# Patient Record
Sex: FEMALE | Race: WHITE | Employment: FULL TIME
[De-identification: names, ages, dates, MRNs, and addresses within clinical notes are randomized per-mention and may not be internally consistent; named-entity substitution may affect disease eponyms.]

---

## 2017-03-07 ENCOUNTER — PATIENT MESSAGE (OUTPATIENT)
Dept: INTERNAL MEDICINE | Facility: CLINIC | Age: 37
End: 2017-03-07

## 2017-03-07 DIAGNOSIS — F32.A DEPRESSION: ICD-10-CM

## 2017-03-07 DIAGNOSIS — F32.A DEPRESSION, UNSPECIFIED DEPRESSION TYPE: ICD-10-CM

## 2017-03-07 RX ORDER — DULOXETIN HYDROCHLORIDE 60 MG/1
60 CAPSULE, DELAYED RELEASE ORAL DAILY
Qty: 30 CAPSULE | Refills: 11 | OUTPATIENT
Start: 2017-03-07

## 2017-03-07 RX ORDER — DULOXETIN HYDROCHLORIDE 60 MG/1
CAPSULE, DELAYED RELEASE ORAL
Qty: 90 CAPSULE | Refills: 3 | Status: SHIPPED | OUTPATIENT
Start: 2017-03-07 | End: 2018-03-08 | Stop reason: SDUPTHER

## 2017-03-09 DIAGNOSIS — R11.0 NAUSEA: Primary | ICD-10-CM

## 2017-03-09 RX ORDER — SCOLOPAMINE TRANSDERMAL SYSTEM 1 MG/1
1 PATCH, EXTENDED RELEASE TRANSDERMAL
Qty: 4 PATCH | Refills: 1 | Status: SHIPPED | OUTPATIENT
Start: 2017-03-09 | End: 2018-03-09

## 2017-03-31 ENCOUNTER — OFFICE VISIT (OUTPATIENT)
Dept: INTERNAL MEDICINE CLINIC | Age: 37
End: 2017-03-31
Payer: COMMERCIAL

## 2017-03-31 VITALS
HEART RATE: 87 BPM | SYSTOLIC BLOOD PRESSURE: 124 MMHG | WEIGHT: 182 LBS | DIASTOLIC BLOOD PRESSURE: 80 MMHG | HEIGHT: 64 IN | BODY MASS INDEX: 31.07 KG/M2

## 2017-03-31 DIAGNOSIS — F32.A DEPRESSION, UNSPECIFIED DEPRESSION TYPE: ICD-10-CM

## 2017-03-31 DIAGNOSIS — Z00.00 ANNUAL PHYSICAL EXAM: Primary | ICD-10-CM

## 2017-03-31 PROCEDURE — 99213 OFFICE O/P EST LOW 20 MIN: CPT | Performed by: INTERNAL MEDICINE

## 2017-03-31 ASSESSMENT — ENCOUNTER SYMPTOMS
CHEST TIGHTNESS: 0
EYE DISCHARGE: 0
ABDOMINAL DISTENTION: 0
COUGH: 0
EYE REDNESS: 0
ABDOMINAL PAIN: 0
BACK PAIN: 0
ANAL BLEEDING: 0
CHOKING: 0
COLOR CHANGE: 0
APNEA: 0
EYE ITCHING: 0
EYE PAIN: 0

## 2017-03-31 ASSESSMENT — PATIENT HEALTH QUESTIONNAIRE - PHQ9
1. LITTLE INTEREST OR PLEASURE IN DOING THINGS: 0
2. FEELING DOWN, DEPRESSED OR HOPELESS: 0
SUM OF ALL RESPONSES TO PHQ9 QUESTIONS 1 & 2: 0
SUM OF ALL RESPONSES TO PHQ QUESTIONS 1-9: 0

## 2017-04-11 ENCOUNTER — EMPLOYEE WELLNESS (OUTPATIENT)
Dept: OTHER | Age: 37
End: 2017-04-11

## 2017-04-11 LAB
CHOLESTEROL/HDL RATIO: 2.7
CHOLESTEROL: 174 MG/DL
GLUCOSE BLD-MCNC: 90 MG/DL (ref 70–99)
HDLC SERPL-MCNC: 64 MG/DL
LDL CHOLESTEROL: 95 MG/DL (ref 0–130)
PATIENT FASTING?: YES
TRIGL SERPL-MCNC: 77 MG/DL
VLDLC SERPL CALC-MCNC: NORMAL MG/DL (ref 1–30)

## 2017-06-08 ENCOUNTER — PATIENT MESSAGE (OUTPATIENT)
Dept: GASTROENTEROLOGY | Facility: CLINIC | Age: 37
End: 2017-06-08

## 2017-06-08 RX ORDER — ESOMEPRAZOLE MAGNESIUM 40 MG/1
40 CAPSULE, DELAYED RELEASE ORAL DAILY
Qty: 30 CAPSULE | Refills: 2 | Status: SHIPPED | OUTPATIENT
Start: 2017-06-08 | End: 2017-09-19 | Stop reason: SDUPTHER

## 2017-09-19 RX ORDER — ESOMEPRAZOLE MAGNESIUM 40 MG/1
CAPSULE, DELAYED RELEASE ORAL
Qty: 30 CAPSULE | Refills: 0 | Status: SHIPPED | OUTPATIENT
Start: 2017-09-19 | End: 2018-05-30 | Stop reason: DRUGHIGH

## 2017-09-29 ENCOUNTER — OFFICE VISIT (OUTPATIENT)
Dept: GASTROENTEROLOGY | Age: 37
End: 2017-09-29
Payer: COMMERCIAL

## 2017-09-29 VITALS
SYSTOLIC BLOOD PRESSURE: 134 MMHG | RESPIRATION RATE: 14 BRPM | BODY MASS INDEX: 32.85 KG/M2 | OXYGEN SATURATION: 99 % | HEIGHT: 64 IN | TEMPERATURE: 98.1 F | HEART RATE: 88 BPM | WEIGHT: 192.4 LBS | DIASTOLIC BLOOD PRESSURE: 90 MMHG

## 2017-09-29 DIAGNOSIS — K21.9 GASTROESOPHAGEAL REFLUX DISEASE WITHOUT ESOPHAGITIS: ICD-10-CM

## 2017-09-29 DIAGNOSIS — K22.2 SCHATZKI'S RING: Primary | ICD-10-CM

## 2017-09-29 DIAGNOSIS — R13.10 DYSPHAGIA, UNSPECIFIED TYPE: ICD-10-CM

## 2017-09-29 PROCEDURE — 99214 OFFICE O/P EST MOD 30 MIN: CPT | Performed by: INTERNAL MEDICINE

## 2017-09-29 RX ORDER — OMEPRAZOLE 20 MG/1
20 CAPSULE, DELAYED RELEASE ORAL
Qty: 90 CAPSULE | Refills: 3 | Status: SHIPPED | OUTPATIENT
Start: 2017-09-29 | End: 2018-05-30 | Stop reason: CLARIF

## 2017-09-29 ASSESSMENT — ENCOUNTER SYMPTOMS
VOMITING: 0
NAUSEA: 0
TROUBLE SWALLOWING: 0
CHOKING: 0
GASTROINTESTINAL NEGATIVE: 1
ABDOMINAL DISTENTION: 0
CONSTIPATION: 0
EYES NEGATIVE: 1
SORE THROAT: 0
ALLERGIC/IMMUNOLOGIC NEGATIVE: 1
RECTAL PAIN: 0
RESPIRATORY NEGATIVE: 1
BLOOD IN STOOL: 0
ABDOMINAL PAIN: 0
ANAL BLEEDING: 0
DIARRHEA: 0

## 2018-01-19 ENCOUNTER — HOSPITAL ENCOUNTER (OUTPATIENT)
Age: 38
Setting detail: SPECIMEN
Discharge: HOME | End: 2018-01-19
Payer: COMMERCIAL

## 2018-01-30 LAB — CYTOLOGY REPORT: NORMAL

## 2018-03-07 DIAGNOSIS — F32.A DEPRESSION, UNSPECIFIED DEPRESSION TYPE: ICD-10-CM

## 2018-03-08 DIAGNOSIS — F32.A DEPRESSION, UNSPECIFIED DEPRESSION TYPE: ICD-10-CM

## 2018-03-08 RX ORDER — DULOXETIN HYDROCHLORIDE 60 MG/1
CAPSULE, DELAYED RELEASE ORAL
Qty: 90 CAPSULE | Refills: 3 | Status: SHIPPED | OUTPATIENT
Start: 2018-03-08 | End: 2019-03-06 | Stop reason: SDUPTHER

## 2018-03-08 RX ORDER — DULOXETIN HYDROCHLORIDE 60 MG/1
CAPSULE, DELAYED RELEASE ORAL
Qty: 90 CAPSULE | Refills: 3 | OUTPATIENT
Start: 2018-03-08

## 2018-03-20 VITALS — BODY MASS INDEX: 30.9 KG/M2 | WEIGHT: 181 LBS

## 2018-04-13 ENCOUNTER — OFFICE VISIT (OUTPATIENT)
Dept: ORTHOPEDIC SURGERY | Age: 38
End: 2018-04-13
Payer: COMMERCIAL

## 2018-04-13 VITALS
HEIGHT: 64 IN | SYSTOLIC BLOOD PRESSURE: 139 MMHG | WEIGHT: 194 LBS | BODY MASS INDEX: 33.12 KG/M2 | DIASTOLIC BLOOD PRESSURE: 89 MMHG

## 2018-04-13 DIAGNOSIS — M25.562 LEFT KNEE PAIN, UNSPECIFIED CHRONICITY: ICD-10-CM

## 2018-04-13 DIAGNOSIS — Z01.818 PRE-OP TESTING: ICD-10-CM

## 2018-04-13 DIAGNOSIS — M17.11 ARTHRITIS OF RIGHT KNEE: Primary | ICD-10-CM

## 2018-04-13 PROCEDURE — 99203 OFFICE O/P NEW LOW 30 MIN: CPT | Performed by: ORTHOPAEDIC SURGERY

## 2018-04-13 RX ORDER — ACETAMINOPHEN 500 MG
500 TABLET ORAL EVERY 6 HOURS PRN
Status: ON HOLD | COMMUNITY
End: 2018-06-13 | Stop reason: HOSPADM

## 2018-04-13 ASSESSMENT — ENCOUNTER SYMPTOMS
CONSTIPATION: 0
DIARRHEA: 0
NAUSEA: 0
COUGH: 0

## 2018-05-18 ENCOUNTER — EMPLOYEE WELLNESS (OUTPATIENT)
Dept: OTHER | Age: 38
End: 2018-05-18

## 2018-05-18 LAB
CHOLESTEROL/HDL RATIO: 3.1
CHOLESTEROL: 172 MG/DL
GLUCOSE BLD-MCNC: 92 MG/DL (ref 70–99)
HDLC SERPL-MCNC: 55 MG/DL
LDL CHOLESTEROL: 90 MG/DL (ref 0–130)
PATIENT FASTING?: YES
TRIGL SERPL-MCNC: 137 MG/DL
VLDLC SERPL CALC-MCNC: NORMAL MG/DL (ref 1–30)

## 2018-05-29 VITALS — WEIGHT: 193 LBS | BODY MASS INDEX: 33.13 KG/M2

## 2018-05-30 ENCOUNTER — HOSPITAL ENCOUNTER (OUTPATIENT)
Dept: PREADMISSION TESTING | Age: 38
Discharge: HOME | End: 2018-06-03
Payer: COMMERCIAL

## 2018-05-30 VITALS
BODY MASS INDEX: 33.12 KG/M2 | TEMPERATURE: 98.5 F | DIASTOLIC BLOOD PRESSURE: 85 MMHG | WEIGHT: 194 LBS | HEART RATE: 84 BPM | OXYGEN SATURATION: 100 % | RESPIRATION RATE: 16 BRPM | SYSTOLIC BLOOD PRESSURE: 123 MMHG | HEIGHT: 64 IN

## 2018-05-30 DIAGNOSIS — Z01.818 PRE-OP TESTING: ICD-10-CM

## 2018-05-30 LAB
ALBUMIN SERPL-MCNC: 4.1 G/DL (ref 3.5–5.2)
ALBUMIN/GLOBULIN RATIO: 1.4 (ref 1–2.5)
ALP BLD-CCNC: 82 U/L (ref 35–104)
ALT SERPL-CCNC: 17 U/L (ref 5–33)
ANION GAP SERPL CALCULATED.3IONS-SCNC: 9 MMOL/L (ref 9–17)
AST SERPL-CCNC: 18 U/L
BILIRUB SERPL-MCNC: 0.22 MG/DL (ref 0.3–1.2)
BILIRUBIN URINE: NEGATIVE
BUN BLDV-MCNC: 23 MG/DL (ref 6–20)
BUN/CREAT BLD: ABNORMAL (ref 9–20)
CALCIUM SERPL-MCNC: 8.7 MG/DL (ref 8.6–10.4)
CHLORIDE BLD-SCNC: 104 MMOL/L (ref 98–107)
CO2: 25 MMOL/L (ref 20–31)
COLOR: YELLOW
COMMENT UA: NORMAL
CREAT SERPL-MCNC: 0.74 MG/DL (ref 0.5–0.9)
EKG ATRIAL RATE: 81 BPM
EKG P AXIS: 59 DEGREES
EKG P-R INTERVAL: 128 MS
EKG Q-T INTERVAL: 388 MS
EKG QRS DURATION: 86 MS
EKG QTC CALCULATION (BAZETT): 450 MS
EKG R AXIS: 53 DEGREES
EKG T AXIS: 36 DEGREES
EKG VENTRICULAR RATE: 81 BPM
GFR AFRICAN AMERICAN: >60 ML/MIN
GFR NON-AFRICAN AMERICAN: >60 ML/MIN
GFR SERPL CREATININE-BSD FRML MDRD: ABNORMAL ML/MIN/{1.73_M2}
GFR SERPL CREATININE-BSD FRML MDRD: ABNORMAL ML/MIN/{1.73_M2}
GLUCOSE BLD-MCNC: 90 MG/DL (ref 70–99)
GLUCOSE URINE: NEGATIVE
HCT VFR BLD CALC: 39.4 % (ref 36.3–47.1)
HEMOGLOBIN: 12.6 G/DL (ref 11.9–15.1)
KETONES, URINE: NEGATIVE
LEUKOCYTE ESTERASE, URINE: NEGATIVE
MCH RBC QN AUTO: 28.6 PG (ref 25.2–33.5)
MCHC RBC AUTO-ENTMCNC: 32 G/DL (ref 28.4–34.8)
MCV RBC AUTO: 89.3 FL (ref 82.6–102.9)
NITRITE, URINE: NEGATIVE
NRBC AUTOMATED: 0 PER 100 WBC
PDW BLD-RTO: 12.9 % (ref 11.8–14.4)
PH UA: 5 (ref 5–8)
PLATELET # BLD: 263 K/UL (ref 138–453)
PMV BLD AUTO: 10.2 FL (ref 8.1–13.5)
POTASSIUM SERPL-SCNC: 4.1 MMOL/L (ref 3.7–5.3)
PROTEIN UA: NEGATIVE
RBC # BLD: 4.41 M/UL (ref 3.95–5.11)
SODIUM BLD-SCNC: 138 MMOL/L (ref 135–144)
SPECIFIC GRAVITY UA: 1.03 (ref 1–1.03)
TOTAL PROTEIN: 7 G/DL (ref 6.4–8.3)
TURBIDITY: CLEAR
URINE HGB: NEGATIVE
UROBILINOGEN, URINE: NORMAL
WBC # BLD: 7.5 K/UL (ref 3.5–11.3)

## 2018-05-30 PROCEDURE — 81003 URINALYSIS AUTO W/O SCOPE: CPT

## 2018-05-30 PROCEDURE — 93005 ELECTROCARDIOGRAM TRACING: CPT

## 2018-05-30 PROCEDURE — 87641 MR-STAPH DNA AMP PROBE: CPT

## 2018-05-30 PROCEDURE — 36415 COLL VENOUS BLD VENIPUNCTURE: CPT

## 2018-05-30 PROCEDURE — 80053 COMPREHEN METABOLIC PANEL: CPT

## 2018-05-30 PROCEDURE — 85027 COMPLETE CBC AUTOMATED: CPT

## 2018-05-30 RX ORDER — SODIUM CHLORIDE, SODIUM LACTATE, POTASSIUM CHLORIDE, CALCIUM CHLORIDE 600; 310; 30; 20 MG/100ML; MG/100ML; MG/100ML; MG/100ML
1000 INJECTION, SOLUTION INTRAVENOUS CONTINUOUS
Status: CANCELLED | OUTPATIENT
Start: 2018-05-30

## 2018-05-30 ASSESSMENT — PAIN DESCRIPTION - ONSET
ONSET: ON-GOING
ONSET: ON-GOING

## 2018-05-30 ASSESSMENT — PAIN DESCRIPTION - LOCATION
LOCATION: KNEE
LOCATION: KNEE

## 2018-05-30 ASSESSMENT — PAIN SCALES - GENERAL
PAINLEVEL_OUTOF10: 7
PAINLEVEL_OUTOF10: 7

## 2018-05-30 ASSESSMENT — PAIN DESCRIPTION - FREQUENCY
FREQUENCY: CONTINUOUS
FREQUENCY: CONTINUOUS

## 2018-05-30 ASSESSMENT — PAIN DESCRIPTION - DIRECTION
RADIATING_TOWARDS: RT. LEG
RADIATING_TOWARDS: RT. LEG

## 2018-05-30 ASSESSMENT — PAIN DESCRIPTION - ORIENTATION
ORIENTATION: RIGHT
ORIENTATION: RIGHT

## 2018-05-30 ASSESSMENT — PAIN DESCRIPTION - PAIN TYPE
TYPE: CHRONIC PAIN
TYPE: CHRONIC PAIN

## 2018-05-30 ASSESSMENT — PAIN DESCRIPTION - DESCRIPTORS
DESCRIPTORS: ACHING;THROBBING;CONSTANT
DESCRIPTORS: ACHING;CONSTANT;THROBBING

## 2018-05-30 ASSESSMENT — PAIN DESCRIPTION - PROGRESSION
CLINICAL_PROGRESSION: GRADUALLY WORSENING
CLINICAL_PROGRESSION: GRADUALLY WORSENING

## 2018-05-31 LAB
MRSA, DNA, NASAL: NORMAL
SPECIMEN DESCRIPTION: NORMAL

## 2018-06-08 ENCOUNTER — OFFICE VISIT (OUTPATIENT)
Dept: INTERNAL MEDICINE CLINIC | Age: 38
End: 2018-06-08
Payer: COMMERCIAL

## 2018-06-08 VITALS
SYSTOLIC BLOOD PRESSURE: 126 MMHG | WEIGHT: 196 LBS | HEIGHT: 64 IN | DIASTOLIC BLOOD PRESSURE: 82 MMHG | BODY MASS INDEX: 33.46 KG/M2

## 2018-06-08 DIAGNOSIS — E66.9 CLASS 1 OBESITY WITH SERIOUS COMORBIDITY IN ADULT, UNSPECIFIED BMI, UNSPECIFIED OBESITY TYPE: Primary | ICD-10-CM

## 2018-06-08 DIAGNOSIS — L98.9 SKIN LESION: ICD-10-CM

## 2018-06-08 PROCEDURE — 99213 OFFICE O/P EST LOW 20 MIN: CPT | Performed by: INTERNAL MEDICINE

## 2018-06-08 ASSESSMENT — PATIENT HEALTH QUESTIONNAIRE - PHQ9
2. FEELING DOWN, DEPRESSED OR HOPELESS: 0
SUM OF ALL RESPONSES TO PHQ9 QUESTIONS 1 & 2: 0
SUM OF ALL RESPONSES TO PHQ QUESTIONS 1-9: 0
1. LITTLE INTEREST OR PLEASURE IN DOING THINGS: 0

## 2018-06-12 ENCOUNTER — ANESTHESIA (OUTPATIENT)
Dept: OPERATING ROOM | Age: 38
DRG: 470 | End: 2018-06-12
Payer: COMMERCIAL

## 2018-06-12 ENCOUNTER — ANESTHESIA EVENT (OUTPATIENT)
Dept: OPERATING ROOM | Age: 38
DRG: 470 | End: 2018-06-12
Payer: COMMERCIAL

## 2018-06-12 ENCOUNTER — APPOINTMENT (OUTPATIENT)
Dept: GENERAL RADIOLOGY | Age: 38
DRG: 470 | End: 2018-06-12
Attending: ORTHOPAEDIC SURGERY
Payer: COMMERCIAL

## 2018-06-12 ENCOUNTER — HOSPITAL ENCOUNTER (INPATIENT)
Age: 38
LOS: 1 days | Discharge: HOME | DRG: 470 | End: 2018-06-13
Attending: ORTHOPAEDIC SURGERY | Admitting: ORTHOPAEDIC SURGERY
Payer: COMMERCIAL

## 2018-06-12 VITALS — OXYGEN SATURATION: 99 % | SYSTOLIC BLOOD PRESSURE: 106 MMHG | DIASTOLIC BLOOD PRESSURE: 58 MMHG | TEMPERATURE: 96.3 F

## 2018-06-12 DIAGNOSIS — Z96.651 STATUS POST RIGHT PARTIAL KNEE REPLACEMENT: Primary | ICD-10-CM

## 2018-06-12 LAB — HCG, PREGNANCY URINE (POC): NEGATIVE

## 2018-06-12 PROCEDURE — 84703 CHORIONIC GONADOTROPIN ASSAY: CPT

## 2018-06-12 PROCEDURE — 6360000002 HC RX W HCPCS

## 2018-06-12 PROCEDURE — 6360000002 HC RX W HCPCS: Performed by: STUDENT IN AN ORGANIZED HEALTH CARE EDUCATION/TRAINING PROGRAM

## 2018-06-12 PROCEDURE — 3600000004 HC SURGERY LEVEL 4 BASE: Performed by: ORTHOPAEDIC SURGERY

## 2018-06-12 PROCEDURE — 1200000000 HC SEMI PRIVATE

## 2018-06-12 PROCEDURE — 0SRC0J9 REPLACEMENT OF RIGHT KNEE JOINT WITH SYNTHETIC SUBSTITUTE, CEMENTED, OPEN APPROACH: ICD-10-PCS | Performed by: ORTHOPAEDIC SURGERY

## 2018-06-12 PROCEDURE — 73562 X-RAY EXAM OF KNEE 3: CPT

## 2018-06-12 PROCEDURE — 2580000003 HC RX 258: Performed by: ORTHOPAEDIC SURGERY

## 2018-06-12 PROCEDURE — 2580000003 HC RX 258: Performed by: STUDENT IN AN ORGANIZED HEALTH CARE EDUCATION/TRAINING PROGRAM

## 2018-06-12 PROCEDURE — 6370000000 HC RX 637 (ALT 250 FOR IP): Performed by: STUDENT IN AN ORGANIZED HEALTH CARE EDUCATION/TRAINING PROGRAM

## 2018-06-12 PROCEDURE — 97530 THERAPEUTIC ACTIVITIES: CPT

## 2018-06-12 PROCEDURE — 2500000003 HC RX 250 WO HCPCS: Performed by: ANESTHESIOLOGY

## 2018-06-12 PROCEDURE — 7100000001 HC PACU RECOVERY - ADDTL 15 MIN: Performed by: ORTHOPAEDIC SURGERY

## 2018-06-12 PROCEDURE — 2580000003 HC RX 258: Performed by: ANESTHESIOLOGY

## 2018-06-12 PROCEDURE — C1776 JOINT DEVICE (IMPLANTABLE): HCPCS | Performed by: ORTHOPAEDIC SURGERY

## 2018-06-12 PROCEDURE — G8987 SELF CARE CURRENT STATUS: HCPCS

## 2018-06-12 PROCEDURE — 3600000014 HC SURGERY LEVEL 4 ADDTL 15MIN: Performed by: ORTHOPAEDIC SURGERY

## 2018-06-12 PROCEDURE — 6360000002 HC RX W HCPCS: Performed by: SPECIALIST

## 2018-06-12 PROCEDURE — G8988 SELF CARE GOAL STATUS: HCPCS

## 2018-06-12 PROCEDURE — G0378 HOSPITAL OBSERVATION PER HR: HCPCS

## 2018-06-12 PROCEDURE — 64447 NJX AA&/STRD FEMORAL NRV IMG: CPT | Performed by: ANESTHESIOLOGY

## 2018-06-12 PROCEDURE — 3700000001 HC ADD 15 MINUTES (ANESTHESIA): Performed by: ORTHOPAEDIC SURGERY

## 2018-06-12 PROCEDURE — 2500000003 HC RX 250 WO HCPCS: Performed by: STUDENT IN AN ORGANIZED HEALTH CARE EDUCATION/TRAINING PROGRAM

## 2018-06-12 PROCEDURE — C1713 ANCHOR/SCREW BN/BN,TIS/BN: HCPCS | Performed by: ORTHOPAEDIC SURGERY

## 2018-06-12 PROCEDURE — 97162 PT EVAL MOD COMPLEX 30 MIN: CPT

## 2018-06-12 PROCEDURE — 6370000000 HC RX 637 (ALT 250 FOR IP): Performed by: ANESTHESIOLOGY

## 2018-06-12 PROCEDURE — G8978 MOBILITY CURRENT STATUS: HCPCS

## 2018-06-12 PROCEDURE — 97166 OT EVAL MOD COMPLEX 45 MIN: CPT

## 2018-06-12 PROCEDURE — 3700000000 HC ANESTHESIA ATTENDED CARE: Performed by: ORTHOPAEDIC SURGERY

## 2018-06-12 PROCEDURE — 2720000010 HC SURG SUPPLY STERILE: Performed by: ORTHOPAEDIC SURGERY

## 2018-06-12 PROCEDURE — 7100000000 HC PACU RECOVERY - FIRST 15 MIN: Performed by: ORTHOPAEDIC SURGERY

## 2018-06-12 PROCEDURE — 97535 SELF CARE MNGMENT TRAINING: CPT

## 2018-06-12 PROCEDURE — G8979 MOBILITY GOAL STATUS: HCPCS

## 2018-06-12 PROCEDURE — 2500000003 HC RX 250 WO HCPCS: Performed by: SPECIALIST

## 2018-06-12 DEVICE — IMPLANTABLE DEVICE: Type: IMPLANTABLE DEVICE | Site: KNEE | Status: FUNCTIONAL

## 2018-06-12 DEVICE — JOURNEY UNI OXINIUM FIXED BEARING                                    FEMORAL SIZE 4 RIGHT MEDIAL/LEFT LATERAL
Type: IMPLANTABLE DEVICE | Site: KNEE | Status: FUNCTIONAL
Brand: JOURNEY

## 2018-06-12 DEVICE — DISCONTINUED USE 416978 CEMENT PALACOS R SING DOSE 40GR: Type: IMPLANTABLE DEVICE | Site: KNEE | Status: FUNCTIONAL

## 2018-06-12 DEVICE — COMPONENT KNEE LOWER EXTREMITIES. ANCIL ZIRCONIUM NAVIO DISP: Type: IMPLANTABLE DEVICE | Site: KNEE | Status: FUNCTIONAL

## 2018-06-12 RX ORDER — SODIUM CHLORIDE 0.9 % (FLUSH) 0.9 %
10 SYRINGE (ML) INJECTION EVERY 12 HOURS SCHEDULED
Status: DISCONTINUED | OUTPATIENT
Start: 2018-06-12 | End: 2018-06-13 | Stop reason: HOSPADM

## 2018-06-12 RX ORDER — KETOROLAC TROMETHAMINE 30 MG/ML
30 INJECTION, SOLUTION INTRAMUSCULAR; INTRAVENOUS EVERY 6 HOURS
Status: DISCONTINUED | OUTPATIENT
Start: 2018-06-12 | End: 2018-06-13 | Stop reason: HOSPADM

## 2018-06-12 RX ORDER — MEPERIDINE HYDROCHLORIDE 50 MG/ML
12.5 INJECTION INTRAMUSCULAR; INTRAVENOUS; SUBCUTANEOUS EVERY 5 MIN PRN
Status: DISCONTINUED | OUTPATIENT
Start: 2018-06-12 | End: 2018-06-12 | Stop reason: HOSPADM

## 2018-06-12 RX ORDER — MORPHINE SULFATE 4 MG/ML
4 INJECTION, SOLUTION INTRAMUSCULAR; INTRAVENOUS
Status: DISCONTINUED | OUTPATIENT
Start: 2018-06-12 | End: 2018-06-13 | Stop reason: HOSPADM

## 2018-06-12 RX ORDER — MORPHINE SULFATE 2 MG/ML
2 INJECTION, SOLUTION INTRAMUSCULAR; INTRAVENOUS
Status: DISCONTINUED | OUTPATIENT
Start: 2018-06-12 | End: 2018-06-13 | Stop reason: HOSPADM

## 2018-06-12 RX ORDER — ACETAMINOPHEN 650 MG
TABLET, EXTENDED RELEASE ORAL PRN
Status: DISCONTINUED | OUTPATIENT
Start: 2018-06-12 | End: 2018-06-12 | Stop reason: HOSPADM

## 2018-06-12 RX ORDER — FENTANYL CITRATE 50 UG/ML
INJECTION, SOLUTION INTRAMUSCULAR; INTRAVENOUS
Status: COMPLETED
Start: 2018-06-12 | End: 2018-06-12

## 2018-06-12 RX ORDER — DULOXETIN HYDROCHLORIDE 30 MG/1
60 CAPSULE, DELAYED RELEASE ORAL DAILY
Status: DISCONTINUED | OUTPATIENT
Start: 2018-06-12 | End: 2018-06-13 | Stop reason: HOSPADM

## 2018-06-12 RX ORDER — MIDAZOLAM HYDROCHLORIDE 1 MG/ML
INJECTION INTRAMUSCULAR; INTRAVENOUS
Status: COMPLETED
Start: 2018-06-12 | End: 2018-06-12

## 2018-06-12 RX ORDER — FENTANYL CITRATE 50 UG/ML
25 INJECTION, SOLUTION INTRAMUSCULAR; INTRAVENOUS EVERY 5 MIN PRN
Status: DISCONTINUED | OUTPATIENT
Start: 2018-06-12 | End: 2018-06-12 | Stop reason: HOSPADM

## 2018-06-12 RX ORDER — MIDAZOLAM HYDROCHLORIDE 1 MG/ML
1 INJECTION INTRAMUSCULAR; INTRAVENOUS EVERY 10 MIN PRN
Status: DISCONTINUED | OUTPATIENT
Start: 2018-06-12 | End: 2018-06-12 | Stop reason: HOSPADM

## 2018-06-12 RX ORDER — PANTOPRAZOLE SODIUM 40 MG/1
40 TABLET, DELAYED RELEASE ORAL
Status: DISCONTINUED | OUTPATIENT
Start: 2018-06-13 | End: 2018-06-13 | Stop reason: HOSPADM

## 2018-06-12 RX ORDER — SODIUM CHLORIDE 0.9 % (FLUSH) 0.9 %
10 SYRINGE (ML) INJECTION PRN
Status: DISCONTINUED | OUTPATIENT
Start: 2018-06-12 | End: 2018-06-12 | Stop reason: HOSPADM

## 2018-06-12 RX ORDER — SODIUM CHLORIDE, SODIUM LACTATE, POTASSIUM CHLORIDE, CALCIUM CHLORIDE 600; 310; 30; 20 MG/100ML; MG/100ML; MG/100ML; MG/100ML
1000 INJECTION, SOLUTION INTRAVENOUS CONTINUOUS
Status: DISCONTINUED | OUTPATIENT
Start: 2018-06-12 | End: 2018-06-12

## 2018-06-12 RX ORDER — KETOROLAC TROMETHAMINE 5 MG/ML
2 SOLUTION OPHTHALMIC ONCE
Status: COMPLETED | OUTPATIENT
Start: 2018-06-12 | End: 2018-06-12

## 2018-06-12 RX ORDER — MAGNESIUM HYDROXIDE 1200 MG/15ML
LIQUID ORAL CONTINUOUS PRN
Status: COMPLETED | OUTPATIENT
Start: 2018-06-12 | End: 2018-06-12

## 2018-06-12 RX ORDER — FENTANYL CITRATE 50 UG/ML
100 INJECTION, SOLUTION INTRAMUSCULAR; INTRAVENOUS ONCE
Status: COMPLETED | OUTPATIENT
Start: 2018-06-12 | End: 2018-06-12

## 2018-06-12 RX ORDER — PREGABALIN 75 MG/1
75 CAPSULE ORAL ONCE
Status: COMPLETED | OUTPATIENT
Start: 2018-06-12 | End: 2018-06-12

## 2018-06-12 RX ORDER — ACETAMINOPHEN 325 MG/1
650 TABLET ORAL EVERY 4 HOURS PRN
Status: DISCONTINUED | OUTPATIENT
Start: 2018-06-12 | End: 2018-06-13 | Stop reason: HOSPADM

## 2018-06-12 RX ORDER — SODIUM CHLORIDE 9 MG/ML
INJECTION, SOLUTION INTRAVENOUS CONTINUOUS
Status: DISCONTINUED | OUTPATIENT
Start: 2018-06-12 | End: 2018-06-13 | Stop reason: HOSPADM

## 2018-06-12 RX ORDER — ASPIRIN 81 MG/1
81 TABLET ORAL 2 TIMES DAILY
Status: DISCONTINUED | OUTPATIENT
Start: 2018-06-13 | End: 2018-06-13 | Stop reason: HOSPADM

## 2018-06-12 RX ORDER — BUPIVACAINE HYDROCHLORIDE 5 MG/ML
30 INJECTION, SOLUTION EPIDURAL; INTRACAUDAL ONCE
Status: COMPLETED | OUTPATIENT
Start: 2018-06-12 | End: 2018-06-12

## 2018-06-12 RX ORDER — ACETAMINOPHEN 500 MG
1000 TABLET ORAL ONCE
Status: COMPLETED | OUTPATIENT
Start: 2018-06-12 | End: 2018-06-12

## 2018-06-12 RX ORDER — DOCUSATE SODIUM 100 MG/1
100 CAPSULE, LIQUID FILLED ORAL 2 TIMES DAILY
Status: DISCONTINUED | OUTPATIENT
Start: 2018-06-12 | End: 2018-06-13 | Stop reason: HOSPADM

## 2018-06-12 RX ORDER — MIDAZOLAM HYDROCHLORIDE 1 MG/ML
2 INJECTION INTRAMUSCULAR; INTRAVENOUS ONCE
Status: COMPLETED | OUTPATIENT
Start: 2018-06-12 | End: 2018-06-12

## 2018-06-12 RX ORDER — SODIUM CHLORIDE 0.9 % (FLUSH) 0.9 %
10 SYRINGE (ML) INJECTION EVERY 12 HOURS SCHEDULED
Status: DISCONTINUED | OUTPATIENT
Start: 2018-06-12 | End: 2018-06-12 | Stop reason: HOSPADM

## 2018-06-12 RX ORDER — HYDROCODONE BITARTRATE AND ACETAMINOPHEN 5; 325 MG/1; MG/1
1 TABLET ORAL EVERY 4 HOURS PRN
Status: DISCONTINUED | OUTPATIENT
Start: 2018-06-12 | End: 2018-06-13 | Stop reason: HOSPADM

## 2018-06-12 RX ORDER — FENTANYL CITRATE 50 UG/ML
INJECTION, SOLUTION INTRAMUSCULAR; INTRAVENOUS PRN
Status: DISCONTINUED | OUTPATIENT
Start: 2018-06-12 | End: 2018-06-12 | Stop reason: SDUPTHER

## 2018-06-12 RX ORDER — ONDANSETRON 2 MG/ML
INJECTION INTRAMUSCULAR; INTRAVENOUS PRN
Status: DISCONTINUED | OUTPATIENT
Start: 2018-06-12 | End: 2018-06-12 | Stop reason: SDUPTHER

## 2018-06-12 RX ORDER — ONDANSETRON 2 MG/ML
4 INJECTION INTRAMUSCULAR; INTRAVENOUS EVERY 6 HOURS PRN
Status: DISCONTINUED | OUTPATIENT
Start: 2018-06-12 | End: 2018-06-13 | Stop reason: HOSPADM

## 2018-06-12 RX ORDER — HYDROCODONE BITARTRATE AND ACETAMINOPHEN 5; 325 MG/1; MG/1
2 TABLET ORAL EVERY 4 HOURS PRN
Status: DISCONTINUED | OUTPATIENT
Start: 2018-06-12 | End: 2018-06-13 | Stop reason: HOSPADM

## 2018-06-12 RX ORDER — BUPIVACAINE HYDROCHLORIDE 7.5 MG/ML
INJECTION, SOLUTION INTRASPINAL PRN
Status: DISCONTINUED | OUTPATIENT
Start: 2018-06-12 | End: 2018-06-12 | Stop reason: SDUPTHER

## 2018-06-12 RX ORDER — PROPOFOL 10 MG/ML
INJECTION, EMULSION INTRAVENOUS CONTINUOUS PRN
Status: DISCONTINUED | OUTPATIENT
Start: 2018-06-12 | End: 2018-06-12 | Stop reason: SDUPTHER

## 2018-06-12 RX ORDER — SODIUM CHLORIDE 0.9 % (FLUSH) 0.9 %
10 SYRINGE (ML) INJECTION PRN
Status: DISCONTINUED | OUTPATIENT
Start: 2018-06-12 | End: 2018-06-13 | Stop reason: HOSPADM

## 2018-06-12 RX ADMIN — BUPIVACAINE HYDROCHLORIDE IN DEXTROSE 2 ML: 7.5 INJECTION, SOLUTION SUBARACHNOID at 10:00

## 2018-06-12 RX ADMIN — HYDROCODONE BITARTRATE AND ACETAMINOPHEN 1 TABLET: 5; 325 TABLET ORAL at 23:35

## 2018-06-12 RX ADMIN — FENTANYL CITRATE 100 MCG: 50 INJECTION, SOLUTION INTRAMUSCULAR; INTRAVENOUS at 09:15

## 2018-06-12 RX ADMIN — PROPOFOL 100 MCG/KG/MIN: 10 INJECTION, EMULSION INTRAVENOUS at 10:05

## 2018-06-12 RX ADMIN — TRANEXAMIC ACID 1 G: 100 INJECTION, SOLUTION INTRAVENOUS at 11:46

## 2018-06-12 RX ADMIN — SODIUM CHLORIDE: 9 INJECTION, SOLUTION INTRAVENOUS at 15:59

## 2018-06-12 RX ADMIN — ACETAMINOPHEN 1000 MG: 500 TABLET ORAL at 08:49

## 2018-06-12 RX ADMIN — KETOROLAC TROMETHAMINE 2 DROP: 5 SOLUTION OPHTHALMIC at 15:55

## 2018-06-12 RX ADMIN — Medication 2 G: at 17:57

## 2018-06-12 RX ADMIN — TRANEXAMIC ACID 1 G: 100 INJECTION, SOLUTION INTRAVENOUS at 10:10

## 2018-06-12 RX ADMIN — Medication 150 MG: at 09:20

## 2018-06-12 RX ADMIN — KETOROLAC TROMETHAMINE 30 MG: 30 INJECTION, SOLUTION INTRAMUSCULAR at 21:32

## 2018-06-12 RX ADMIN — FENTANYL CITRATE 25 MCG: 50 INJECTION INTRAMUSCULAR; INTRAVENOUS at 10:00

## 2018-06-12 RX ADMIN — MIDAZOLAM HYDROCHLORIDE 2 MG: 1 INJECTION, SOLUTION INTRAMUSCULAR; INTRAVENOUS at 09:15

## 2018-06-12 RX ADMIN — KETOROLAC TROMETHAMINE 30 MG: 30 INJECTION, SOLUTION INTRAMUSCULAR at 15:55

## 2018-06-12 RX ADMIN — SODIUM CHLORIDE, POTASSIUM CHLORIDE, SODIUM LACTATE AND CALCIUM CHLORIDE 1000 ML: 600; 310; 30; 20 INJECTION, SOLUTION INTRAVENOUS at 08:54

## 2018-06-12 RX ADMIN — PREGABALIN 75 MG: 75 CAPSULE ORAL at 08:49

## 2018-06-12 RX ADMIN — Medication 2 G: at 10:08

## 2018-06-12 RX ADMIN — PHENYLEPHRINE HYDROCHLORIDE 100 MCG: 10 INJECTION INTRAVENOUS at 10:02

## 2018-06-12 RX ADMIN — SODIUM CHLORIDE: 9 INJECTION, SOLUTION INTRAVENOUS at 23:35

## 2018-06-12 RX ADMIN — DOCUSATE SODIUM 100 MG: 100 CAPSULE ORAL at 15:55

## 2018-06-12 RX ADMIN — MIDAZOLAM HYDROCHLORIDE 2 MG: 1 INJECTION INTRAMUSCULAR; INTRAVENOUS at 09:15

## 2018-06-12 RX ADMIN — ONDANSETRON 4 MG: 2 INJECTION INTRAMUSCULAR; INTRAVENOUS at 11:29

## 2018-06-12 RX ADMIN — FENTANYL CITRATE 75 MCG: 50 INJECTION INTRAMUSCULAR; INTRAVENOUS at 10:05

## 2018-06-12 ASSESSMENT — PULMONARY FUNCTION TESTS
PIF_VALUE: 0
PIF_VALUE: 1
PIF_VALUE: 0

## 2018-06-12 ASSESSMENT — PAIN SCALES - GENERAL
PAINLEVEL_OUTOF10: 0
PAINLEVEL_OUTOF10: 7
PAINLEVEL_OUTOF10: 0
PAINLEVEL_OUTOF10: 0
PAINLEVEL_OUTOF10: 2
PAINLEVEL_OUTOF10: 2
PAINLEVEL_OUTOF10: 4
PAINLEVEL_OUTOF10: 7
PAINLEVEL_OUTOF10: 0
PAINLEVEL_OUTOF10: 1

## 2018-06-12 ASSESSMENT — PAIN DESCRIPTION - FREQUENCY: FREQUENCY: INTERMITTENT

## 2018-06-12 ASSESSMENT — PAIN DESCRIPTION - ORIENTATION: ORIENTATION: RIGHT

## 2018-06-12 ASSESSMENT — PAIN DESCRIPTION - LOCATION: LOCATION: KNEE

## 2018-06-12 ASSESSMENT — PAIN DESCRIPTION - DESCRIPTORS: DESCRIPTORS: OTHER (COMMENT)

## 2018-06-12 ASSESSMENT — PAIN - FUNCTIONAL ASSESSMENT: PAIN_FUNCTIONAL_ASSESSMENT: 0-10

## 2018-06-12 ASSESSMENT — PAIN DESCRIPTION - ONSET: ONSET: ON-GOING

## 2018-06-12 ASSESSMENT — PAIN DESCRIPTION - PAIN TYPE: TYPE: SURGICAL PAIN

## 2018-06-13 VITALS
HEIGHT: 64 IN | SYSTOLIC BLOOD PRESSURE: 143 MMHG | BODY MASS INDEX: 33.46 KG/M2 | TEMPERATURE: 98.4 F | OXYGEN SATURATION: 100 % | RESPIRATION RATE: 16 BRPM | HEART RATE: 91 BPM | WEIGHT: 196 LBS | DIASTOLIC BLOOD PRESSURE: 73 MMHG

## 2018-06-13 LAB
ABSOLUTE EOS #: 0.22 K/UL (ref 0–0.44)
ABSOLUTE IMMATURE GRANULOCYTE: 0.03 K/UL (ref 0–0.3)
ABSOLUTE LYMPH #: 2.27 K/UL (ref 1.1–3.7)
ABSOLUTE MONO #: 0.88 K/UL (ref 0.1–1.2)
ANION GAP SERPL CALCULATED.3IONS-SCNC: 7 MMOL/L (ref 9–17)
BASOPHILS # BLD: 1 % (ref 0–2)
BASOPHILS ABSOLUTE: 0.05 K/UL (ref 0–0.2)
BUN BLDV-MCNC: 13 MG/DL (ref 6–20)
BUN/CREAT BLD: ABNORMAL (ref 9–20)
CALCIUM SERPL-MCNC: 7.7 MG/DL (ref 8.6–10.4)
CHLORIDE BLD-SCNC: 106 MMOL/L (ref 98–107)
CO2: 22 MMOL/L (ref 20–31)
CREAT SERPL-MCNC: 0.81 MG/DL (ref 0.5–0.9)
DIFFERENTIAL TYPE: ABNORMAL
EOSINOPHILS RELATIVE PERCENT: 2 % (ref 1–4)
GFR AFRICAN AMERICAN: >60 ML/MIN
GFR NON-AFRICAN AMERICAN: >60 ML/MIN
GFR SERPL CREATININE-BSD FRML MDRD: ABNORMAL ML/MIN/{1.73_M2}
GFR SERPL CREATININE-BSD FRML MDRD: ABNORMAL ML/MIN/{1.73_M2}
GLUCOSE BLD-MCNC: 96 MG/DL (ref 70–99)
HCT VFR BLD CALC: 37.8 % (ref 36.3–47.1)
HEMOGLOBIN: 11.7 G/DL (ref 11.9–15.1)
IMMATURE GRANULOCYTES: 0 %
LYMPHOCYTES # BLD: 23 % (ref 24–43)
MCH RBC QN AUTO: 28.5 PG (ref 25.2–33.5)
MCHC RBC AUTO-ENTMCNC: 31 G/DL (ref 28.4–34.8)
MCV RBC AUTO: 92 FL (ref 82.6–102.9)
MONOCYTES # BLD: 9 % (ref 3–12)
NRBC AUTOMATED: 0 PER 100 WBC
PDW BLD-RTO: 13.1 % (ref 11.8–14.4)
PLATELET # BLD: 228 K/UL (ref 138–453)
PLATELET ESTIMATE: ABNORMAL
PMV BLD AUTO: 10.3 FL (ref 8.1–13.5)
POTASSIUM SERPL-SCNC: 4 MMOL/L (ref 3.7–5.3)
RBC # BLD: 4.11 M/UL (ref 3.95–5.11)
RBC # BLD: ABNORMAL 10*6/UL
SEG NEUTROPHILS: 65 % (ref 36–65)
SEGMENTED NEUTROPHILS ABSOLUTE COUNT: 6.42 K/UL (ref 1.5–8.1)
SODIUM BLD-SCNC: 135 MMOL/L (ref 135–144)
WBC # BLD: 9.9 K/UL (ref 3.5–11.3)
WBC # BLD: ABNORMAL 10*3/UL

## 2018-06-13 PROCEDURE — 96376 TX/PRO/DX INJ SAME DRUG ADON: CPT

## 2018-06-13 PROCEDURE — 97110 THERAPEUTIC EXERCISES: CPT

## 2018-06-13 PROCEDURE — 85025 COMPLETE CBC W/AUTO DIFF WBC: CPT

## 2018-06-13 PROCEDURE — 6370000000 HC RX 637 (ALT 250 FOR IP): Performed by: STUDENT IN AN ORGANIZED HEALTH CARE EDUCATION/TRAINING PROGRAM

## 2018-06-13 PROCEDURE — 6360000002 HC RX W HCPCS: Performed by: STUDENT IN AN ORGANIZED HEALTH CARE EDUCATION/TRAINING PROGRAM

## 2018-06-13 PROCEDURE — 20985 CPTR-ASST DIR MS PX: CPT | Performed by: ORTHOPAEDIC SURGERY

## 2018-06-13 PROCEDURE — G0378 HOSPITAL OBSERVATION PER HR: HCPCS

## 2018-06-13 PROCEDURE — 27446 REVISION OF KNEE JOINT: CPT | Performed by: ORTHOPAEDIC SURGERY

## 2018-06-13 PROCEDURE — 96374 THER/PROPH/DIAG INJ IV PUSH: CPT

## 2018-06-13 PROCEDURE — 36415 COLL VENOUS BLD VENIPUNCTURE: CPT

## 2018-06-13 PROCEDURE — 2580000003 HC RX 258: Performed by: STUDENT IN AN ORGANIZED HEALTH CARE EDUCATION/TRAINING PROGRAM

## 2018-06-13 PROCEDURE — 97116 GAIT TRAINING THERAPY: CPT

## 2018-06-13 PROCEDURE — 94762 N-INVAS EAR/PLS OXIMTRY CONT: CPT

## 2018-06-13 PROCEDURE — 97535 SELF CARE MNGMENT TRAINING: CPT

## 2018-06-13 PROCEDURE — 80048 BASIC METABOLIC PNL TOTAL CA: CPT

## 2018-06-13 RX ORDER — SENNOSIDES 8.6 MG
1 TABLET ORAL 2 TIMES DAILY
Qty: 60 TABLET | Refills: 0 | Status: SHIPPED | OUTPATIENT
Start: 2018-06-13 | End: 2020-03-13

## 2018-06-13 RX ORDER — ASPIRIN 81 MG/1
81 TABLET ORAL 2 TIMES DAILY
Qty: 28 TABLET | Refills: 0 | Status: SHIPPED | OUTPATIENT
Start: 2018-06-13 | End: 2019-04-05

## 2018-06-13 RX ORDER — HYDROCODONE BITARTRATE AND ACETAMINOPHEN 5; 325 MG/1; MG/1
1 TABLET ORAL EVERY 6 HOURS PRN
Qty: 28 TABLET | Refills: 0 | Status: SHIPPED | OUTPATIENT
Start: 2018-06-13 | End: 2018-06-20

## 2018-06-13 RX ORDER — DOCUSATE SODIUM 100 MG/1
100 CAPSULE, LIQUID FILLED ORAL 2 TIMES DAILY PRN
Qty: 60 CAPSULE | Refills: 0 | Status: SHIPPED | OUTPATIENT
Start: 2018-06-13 | End: 2020-03-13 | Stop reason: ALTCHOICE

## 2018-06-13 RX ADMIN — DULOXETINE HYDROCHLORIDE 60 MG: 30 CAPSULE, DELAYED RELEASE ORAL at 08:42

## 2018-06-13 RX ADMIN — HYDROCODONE BITARTRATE AND ACETAMINOPHEN 2 TABLET: 5; 325 TABLET ORAL at 08:42

## 2018-06-13 RX ADMIN — KETOROLAC TROMETHAMINE 30 MG: 30 INJECTION, SOLUTION INTRAMUSCULAR at 08:42

## 2018-06-13 RX ADMIN — PANTOPRAZOLE SODIUM 40 MG: 40 TABLET, DELAYED RELEASE ORAL at 06:12

## 2018-06-13 RX ADMIN — ASPIRIN 81 MG: 81 TABLET, COATED ORAL at 08:42

## 2018-06-13 RX ADMIN — Medication 10 ML: at 08:43

## 2018-06-13 RX ADMIN — KETOROLAC TROMETHAMINE 30 MG: 30 INJECTION, SOLUTION INTRAMUSCULAR at 01:57

## 2018-06-13 RX ADMIN — DOCUSATE SODIUM 100 MG: 100 CAPSULE ORAL at 08:42

## 2018-06-13 RX ADMIN — Medication 2 G: at 01:57

## 2018-06-13 ASSESSMENT — PAIN SCALES - GENERAL
PAINLEVEL_OUTOF10: 7
PAINLEVEL_OUTOF10: 5
PAINLEVEL_OUTOF10: 2
PAINLEVEL_OUTOF10: 3
PAINLEVEL_OUTOF10: 4
PAINLEVEL_OUTOF10: 2

## 2018-06-13 ASSESSMENT — PAIN DESCRIPTION - FREQUENCY: FREQUENCY: INTERMITTENT

## 2018-06-13 ASSESSMENT — PAIN DESCRIPTION - LOCATION
LOCATION: KNEE
LOCATION: KNEE

## 2018-06-13 ASSESSMENT — PAIN DESCRIPTION - PAIN TYPE
TYPE: SURGICAL PAIN
TYPE: SURGICAL PAIN

## 2018-06-13 ASSESSMENT — PAIN DESCRIPTION - ORIENTATION
ORIENTATION: RIGHT
ORIENTATION: RIGHT

## 2018-06-13 ASSESSMENT — PAIN DESCRIPTION - DESCRIPTORS: DESCRIPTORS: ACHING;DISCOMFORT

## 2018-06-14 ENCOUNTER — TELEPHONE (OUTPATIENT)
Dept: CASE MANAGEMENT | Age: 38
End: 2018-06-14

## 2018-06-15 ENCOUNTER — HOSPITAL ENCOUNTER (OUTPATIENT)
Dept: PHYSICAL THERAPY | Age: 38
Setting detail: THERAPIES SERIES
Discharge: HOME | End: 2018-06-15
Payer: COMMERCIAL

## 2018-06-15 PROCEDURE — G0283 ELEC STIM OTHER THAN WOUND: HCPCS

## 2018-06-15 PROCEDURE — 97161 PT EVAL LOW COMPLEX 20 MIN: CPT

## 2018-06-15 PROCEDURE — 97110 THERAPEUTIC EXERCISES: CPT

## 2018-06-15 ASSESSMENT — PAIN SCALES - GENERAL: PAINLEVEL_OUTOF10: 9

## 2018-06-15 ASSESSMENT — PAIN DESCRIPTION - LOCATION: LOCATION: KNEE

## 2018-06-15 ASSESSMENT — PAIN DESCRIPTION - FREQUENCY: FREQUENCY: CONTINUOUS

## 2018-06-15 ASSESSMENT — PAIN DESCRIPTION - ORIENTATION: ORIENTATION: RIGHT

## 2018-06-15 ASSESSMENT — PAIN DESCRIPTION - DESCRIPTORS: DESCRIPTORS: TIGHTNESS;CONSTANT

## 2018-06-18 ENCOUNTER — TELEPHONE (OUTPATIENT)
Dept: CASE MANAGEMENT | Age: 38
End: 2018-06-18

## 2018-06-18 ENCOUNTER — HOSPITAL ENCOUNTER (OUTPATIENT)
Dept: PHYSICAL THERAPY | Age: 38
Setting detail: THERAPIES SERIES
Discharge: HOME | End: 2018-06-18
Payer: COMMERCIAL

## 2018-06-18 PROCEDURE — 97110 THERAPEUTIC EXERCISES: CPT

## 2018-06-18 PROCEDURE — G0283 ELEC STIM OTHER THAN WOUND: HCPCS

## 2018-06-20 ENCOUNTER — HOSPITAL ENCOUNTER (OUTPATIENT)
Dept: PHYSICAL THERAPY | Age: 38
Setting detail: THERAPIES SERIES
Discharge: HOME | End: 2018-06-20
Payer: COMMERCIAL

## 2018-06-20 PROCEDURE — 97110 THERAPEUTIC EXERCISES: CPT

## 2018-06-20 PROCEDURE — G0283 ELEC STIM OTHER THAN WOUND: HCPCS

## 2018-06-20 ASSESSMENT — PAIN DESCRIPTION - LOCATION: LOCATION: KNEE

## 2018-06-20 ASSESSMENT — PAIN DESCRIPTION - ORIENTATION: ORIENTATION: RIGHT

## 2018-06-20 ASSESSMENT — PAIN DESCRIPTION - DESCRIPTORS: DESCRIPTORS: ACHING;DULL;SHARP;CONSTANT

## 2018-06-20 ASSESSMENT — PAIN DESCRIPTION - FREQUENCY: FREQUENCY: CONTINUOUS

## 2018-06-20 ASSESSMENT — PAIN SCALES - GENERAL: PAINLEVEL_OUTOF10: 7

## 2018-06-22 ENCOUNTER — HOSPITAL ENCOUNTER (OUTPATIENT)
Dept: PHYSICAL THERAPY | Age: 38
Setting detail: THERAPIES SERIES
Discharge: HOME | End: 2018-06-22
Payer: COMMERCIAL

## 2018-06-22 DIAGNOSIS — M17.11 ARTHRITIS OF RIGHT KNEE: Primary | ICD-10-CM

## 2018-06-22 PROCEDURE — 97110 THERAPEUTIC EXERCISES: CPT

## 2018-06-22 PROCEDURE — G0283 ELEC STIM OTHER THAN WOUND: HCPCS

## 2018-06-25 ENCOUNTER — HOSPITAL ENCOUNTER (OUTPATIENT)
Dept: PHYSICAL THERAPY | Age: 38
Setting detail: THERAPIES SERIES
Discharge: HOME | End: 2018-06-25
Payer: COMMERCIAL

## 2018-06-25 ENCOUNTER — OFFICE VISIT (OUTPATIENT)
Dept: ORTHOPEDIC SURGERY | Age: 38
End: 2018-06-25

## 2018-06-25 DIAGNOSIS — M17.11 ARTHRITIS OF RIGHT KNEE: Primary | ICD-10-CM

## 2018-06-25 DIAGNOSIS — Z96.651 S/P RIGHT UNICOMPARTMENTAL KNEE REPLACEMENT: ICD-10-CM

## 2018-06-25 PROCEDURE — 99024 POSTOP FOLLOW-UP VISIT: CPT | Performed by: ORTHOPAEDIC SURGERY

## 2018-06-25 RX ORDER — KETOROLAC TROMETHAMINE 10 MG/1
10 TABLET, FILM COATED ORAL 3 TIMES DAILY PRN
Qty: 20 TABLET | Refills: 0 | Status: SHIPPED | OUTPATIENT
Start: 2018-06-25 | End: 2018-08-31 | Stop reason: ALTCHOICE

## 2018-06-25 ASSESSMENT — ENCOUNTER SYMPTOMS
APNEA: 0
COUGH: 0
ABDOMINAL PAIN: 0
CHEST TIGHTNESS: 0
VOMITING: 0

## 2018-06-25 NOTE — PROGRESS NOTES
femoral component. The patient clinically is doing well and at this point we are going to see how she recovers over time and see how she improves with physical therapy. Follow up:Return in about 4 weeks (around 7/23/2018). Orders Placed This Encounter   Medications    ketorolac (TORADOL) 10 MG tablet     Sig: Take 1 tablet by mouth 3 times daily as needed for Pain     Dispense:  20 tablet     Refill:  0       No orders of the defined types were placed in this encounter. Javier Dobbs LPN am scribing for and in the presence of Dr. Indy Rajput  7/2/2018 10:23 AM    I have reviewed and made changes accordingly to the work scribed by Darnelle Cowden, LPN. The documentation accurately reflects work and decisions made by me. I have also reviewed documentation completed by clinical staff.     Indy Rajput DO, [address] [place] [address] Medicine  7/2/2018 10:24 AM    Electronically signed by Dustin Mcguire DO, Abdelrahman Rosa on 7/2/2018 at 10:23 AM

## 2018-06-27 ENCOUNTER — HOSPITAL ENCOUNTER (OUTPATIENT)
Dept: PHYSICAL THERAPY | Age: 38
Setting detail: THERAPIES SERIES
Discharge: HOME | End: 2018-06-27
Payer: COMMERCIAL

## 2018-06-27 PROCEDURE — G0283 ELEC STIM OTHER THAN WOUND: HCPCS

## 2018-06-27 PROCEDURE — 97016 VASOPNEUMATIC DEVICE THERAPY: CPT

## 2018-06-27 PROCEDURE — 97110 THERAPEUTIC EXERCISES: CPT

## 2018-06-27 ASSESSMENT — PAIN SCALES - GENERAL: PAINLEVEL_OUTOF10: 7

## 2018-06-27 ASSESSMENT — PAIN DESCRIPTION - LOCATION: LOCATION: KNEE

## 2018-06-27 ASSESSMENT — PAIN DESCRIPTION - ORIENTATION: ORIENTATION: RIGHT

## 2018-06-27 ASSESSMENT — PAIN DESCRIPTION - FREQUENCY: FREQUENCY: INTERMITTENT

## 2018-06-27 ASSESSMENT — PAIN DESCRIPTION - DESCRIPTORS: DESCRIPTORS: SHARP

## 2018-06-29 ENCOUNTER — HOSPITAL ENCOUNTER (OUTPATIENT)
Dept: PHYSICAL THERAPY | Age: 38
Setting detail: THERAPIES SERIES
Discharge: HOME | End: 2018-06-29
Payer: COMMERCIAL

## 2018-06-29 ENCOUNTER — PATIENT MESSAGE (OUTPATIENT)
Dept: INTERNAL MEDICINE CLINIC | Age: 38
End: 2018-06-29

## 2018-06-29 PROCEDURE — 97110 THERAPEUTIC EXERCISES: CPT

## 2018-06-29 PROCEDURE — G0283 ELEC STIM OTHER THAN WOUND: HCPCS

## 2018-06-29 PROCEDURE — 97016 VASOPNEUMATIC DEVICE THERAPY: CPT

## 2018-06-29 ASSESSMENT — PAIN DESCRIPTION - ORIENTATION: ORIENTATION: RIGHT

## 2018-06-29 ASSESSMENT — PAIN DESCRIPTION - DESCRIPTORS: DESCRIPTORS: SHARP

## 2018-06-29 ASSESSMENT — PAIN DESCRIPTION - FREQUENCY: FREQUENCY: INTERMITTENT

## 2018-06-29 ASSESSMENT — PAIN DESCRIPTION - LOCATION: LOCATION: KNEE

## 2018-06-29 ASSESSMENT — PAIN SCALES - GENERAL: PAINLEVEL_OUTOF10: 5

## 2018-07-03 ENCOUNTER — HOSPITAL ENCOUNTER (OUTPATIENT)
Dept: PHYSICAL THERAPY | Age: 38
Setting detail: THERAPIES SERIES
Discharge: HOME | End: 2018-07-03
Payer: COMMERCIAL

## 2018-07-03 PROCEDURE — 97110 THERAPEUTIC EXERCISES: CPT

## 2018-07-03 NOTE — PROGRESS NOTES
knee(partly met)  Long term goal 2: increase AROM R knee 0-135(not met)  Long term goal 3: increase strength R knee 5/5(partly met)  Long term goal 4: indep Gait no device(met)  Long term goal 5: improve optimal instrument from 15/15 to 3/15( walk long distance 5 outdoor 5 squatting 5)partly met optimal instrument 7/15      Treatment Charges: Minutes Units   []  Ultrasound     []  Electrical-Stim     []  Iontophoresis     []  Traction     []  Massage       []  Eval     []  Gait     [x]  Ther Exercise 45  3    []  Manual Therapy       []  Ther Activities       []  Aquatics     []  Vasopneumatic Device     []  Neuro Re-Ed       []  Other       Total Treatment Time: 45 3          Therapy Time   Individual Concurrent Group Co-treatment   Time In 0957         Time Out 1042         Minutes 45         Timed Code Treatment Minutes: 45 Minutes    Treatment Plan:  [x] Therapeutic Exercise    [] Modalities:  [] Therapeutic Activity    [] Ultrasound  [] Electrical Stimulation  [] Gait Training     []Massage       [] Lumbar/Cervical Traction  [] Neuromuscular Re-education [] Cold/hotpack [] Other:  [x] Instruction in HEP     [] Work Conditioning                                         [] Manual Therapy             [] Aquatic Therapy               [] Iontophoresis: 4 mg/mL      Dexamethasone Sodium      Phosphate 40-80mAmin (Allergies Reviewed)     Frequency:        2   X/wk x        4  wk's  PLEASE SIN BELOW IF OKAY TO CONTINUE PT          Medicare/Regulatory Requirements:  I have reviewed this plan of care and certify a need for   Medically necessary rehabilitation services.   [] Physician Signature    Date:     Electronically signed by: Rupali Buckner, [id number] [place] [address] [place], [address] [facility]  Phone [phone]  Fax [phone]

## 2018-07-05 ENCOUNTER — HOSPITAL ENCOUNTER (OUTPATIENT)
Dept: PHYSICAL THERAPY | Age: 38
Setting detail: THERAPIES SERIES
Discharge: HOME | End: 2018-07-05
Payer: COMMERCIAL

## 2018-07-05 PROCEDURE — 97110 THERAPEUTIC EXERCISES: CPT

## 2018-07-05 NOTE — PROGRESS NOTES
Physical Therapy  Daily Treatment Note  Date: 2018  Patient Name: Leda Keith  MRN: [id number]     :   1980    Subjective:      PT Visit Information  Onset Date: 06/15/16  PT Insurance Information: MMO  Total # of Visits Approved: 20  Total # of Visits to Date: 9  Subjective  Subjective: no complains of pain today on R knee  General Comment  Comments: new script received to continue PT 2x4  Pain Screening  Patient Currently in Pain: No  Vital Signs  Patient Currently in Pain: No       Treatment Activities:   Exercises  Exercise 1: leg press BLE 25# 3x10  Exercise 2: bike hole 5 5'  Exercise 3: walking free motion machine 13# 3x  Exercise 4: sitting ext stretch 30\" hold 5x  Exercise 5: slant board 3x30\"  Exercise 6: step up 81/2\" F/L 10x  Exercise 7: wall stretch 10x hold 30\"  Exercise 8: lunge on step up 5x hold 30\"  Exercise 9: 4 way hip free motion machine 10# BLE 10x  Exercise 10: leg ext BLE 10# 3x10  Exercise 11: TKE R free motion machine 10# 30x  Exercise 12: eccentric R quads 41/2\" 3x10  Exercise 13:  Total Gym L10 squat/heel raises 3x10     Assessment:   Conditions Requiring Skilled Therapeutic Intervention  Assessment: progress with ex  REQUIRES PT FOLLOW UP: Yes     Plan:    Plan  Times per week: 2x/week  Current Treatment Recommendations: Strengthening, ROM, Modalities, Home Exercise Program  Plan Comment: to continue PT per POC  Timed Code Treatment Minutes: 45 Minutes   Treatment Charges: Minutes Units   []  Ultrasound     []  Electrical-Stim     []  Iontophoresis     []  Traction     []  Massage       []  Eval     []  Gait     [x]  Ther Exercise 45   3   []  Manual Therapy       []  Ther Activities       []  Aquatics     []  Vasopneumatic Device     []  Neuro Re-Ed       []  Other       Total Treatment Time: 45 3        Therapy Time   Individual Concurrent Group Co-treatment   Time In 1000         Time Out 0946         Minutes 45         Timed Code Treatment Minutes: 45 Minutes Electronically signed by: Erin Alvarado, PT

## 2018-07-06 ENCOUNTER — HOSPITAL ENCOUNTER (OUTPATIENT)
Dept: PHYSICAL THERAPY | Age: 38
Setting detail: THERAPIES SERIES
Discharge: HOME | End: 2018-07-06
Payer: COMMERCIAL

## 2018-07-06 PROCEDURE — 97110 THERAPEUTIC EXERCISES: CPT

## 2018-07-09 ENCOUNTER — HOSPITAL ENCOUNTER (OUTPATIENT)
Dept: PHYSICAL THERAPY | Age: 38
Setting detail: THERAPIES SERIES
Discharge: HOME | End: 2018-07-09
Payer: COMMERCIAL

## 2018-07-09 ENCOUNTER — OFFICE VISIT (OUTPATIENT)
Dept: INTERNAL MEDICINE CLINIC | Age: 38
End: 2018-07-09
Payer: COMMERCIAL

## 2018-07-09 VITALS
DIASTOLIC BLOOD PRESSURE: 82 MMHG | WEIGHT: 193 LBS | SYSTOLIC BLOOD PRESSURE: 134 MMHG | BODY MASS INDEX: 32.95 KG/M2 | HEIGHT: 64 IN

## 2018-07-09 DIAGNOSIS — R63.4 WEIGHT LOSS: ICD-10-CM

## 2018-07-09 DIAGNOSIS — T30.0 BURN: Primary | ICD-10-CM

## 2018-07-09 DIAGNOSIS — L23.9 CONTACT ALLERGIC REACTION: ICD-10-CM

## 2018-07-09 PROCEDURE — 99213 OFFICE O/P EST LOW 20 MIN: CPT | Performed by: PHYSICIAN ASSISTANT

## 2018-07-09 PROCEDURE — 97110 THERAPEUTIC EXERCISES: CPT

## 2018-07-09 RX ORDER — METHYLPREDNISOLONE 4 MG/1
TABLET ORAL
Qty: 1 KIT | Refills: 0 | Status: SHIPPED | OUTPATIENT
Start: 2018-07-09 | End: 2018-07-15

## 2018-07-09 ASSESSMENT — ENCOUNTER SYMPTOMS
NAUSEA: 0
SHORTNESS OF BREATH: 0
VOMITING: 0
ABDOMINAL PAIN: 0

## 2018-07-09 NOTE — PROGRESS NOTES
Physical Therapy  Daily Treatment Note  Date: 2018  Patient Name: Ricardo Stratton  MRN: [id number]     :   1980    Subjective:      PT Visit Information  Onset Date: 06/15/16  PT Insurance Information: MMO  Total # of Visits Approved: 20  Total # of Visits to Date: 11  Subjective  Subjective: no complains of pain today on R knee  Pain Screening  Patient Currently in Pain: No  Vital Signs  Patient Currently in Pain: No       Treatment Activities:   Exercises  Exercise 1: leg press BLE 25# 3x10  Exercise 2: bike hole 4 5'  Exercise 3: walking free motion machine 13# 3x  Exercise 4: sitting ext stretch 30\" hold 5x  Exercise 5: slant board 3x30\"  Exercise 6: step up 81/2\" F/L 10x  Exercise 7: wall stretch 10x hold 30\"  Exercise 8: lunge on step up 5x hold 30\"  Exercise 9: 4 way hip free motion machine 10# BLE 10x  Exercise 10: leg ext BLE 10# 3x10  Exercise 11: TKE R free motion machine 10# 30x  Exercise 12: eccentric R quads 41/2\" 3x10  Exercise 13:  Total Gym L10 squat/heel raises 3x10    Assessment:   Conditions Requiring Skilled Therapeutic Intervention  REQUIRES PT FOLLOW UP: Yes     Plan:    Plan  Times per week: 2x/week  Current Treatment Recommendations: Strengthening, ROM, Modalities, Home Exercise Program  Plan Comment: to continue PT per POC  Timed Code Treatment Minutes: 45 Minutes   Treatment Charges: Minutes Units   []  Ultrasound     []  Electrical-Stim     []  Iontophoresis     []  Traction     []  Massage       []  Eval     []  Gait     [x]  Ther Exercise 45  3    []  Manual Therapy       []  Ther Activities       []  Aquatics     []  Vasopneumatic Device     []  Neuro Re-Ed       []  Other       Total Treatment Time: 45 3        Therapy Time   Individual Concurrent Group Co-treatment   Time In 1345         Time Out 1430         Minutes 45         Timed Code Treatment Minutes: 39 Minutes     Electronically signed by: Vincenzo Romero PT

## 2018-07-10 ENCOUNTER — TELEPHONE (OUTPATIENT)
Dept: INTERNAL MEDICINE CLINIC | Age: 38
End: 2018-07-10

## 2018-07-12 ENCOUNTER — HOSPITAL ENCOUNTER (OUTPATIENT)
Dept: PHYSICAL THERAPY | Age: 38
Setting detail: THERAPIES SERIES
Discharge: HOME | End: 2018-07-12
Payer: COMMERCIAL

## 2018-07-12 PROCEDURE — G0283 ELEC STIM OTHER THAN WOUND: HCPCS

## 2018-07-12 PROCEDURE — 97110 THERAPEUTIC EXERCISES: CPT

## 2018-07-12 ASSESSMENT — PAIN DESCRIPTION - DESCRIPTORS: DESCRIPTORS: ACHING;CONSTANT;DULL;SHARP

## 2018-07-12 ASSESSMENT — PAIN DESCRIPTION - ORIENTATION: ORIENTATION: RIGHT

## 2018-07-12 ASSESSMENT — PAIN SCALES - GENERAL: PAINLEVEL_OUTOF10: 4

## 2018-07-12 ASSESSMENT — PAIN DESCRIPTION - LOCATION: LOCATION: KNEE

## 2018-07-12 ASSESSMENT — PAIN DESCRIPTION - FREQUENCY: FREQUENCY: CONTINUOUS

## 2018-07-12 NOTE — PROGRESS NOTES
Physical Therapy  Daily Treatment Note  Date: 2018  Patient Name: Cheryle Torres  MRN: [id number]     :   1980    Subjective:      PT Visit Information  Onset Date: 06/15/16  PT Insurance Information: MMO  Total # of Visits Approved: 20  Total # of Visits to Date: 12  Subjective  Subjective: pain complained on R knee today  Pain Screening  Patient Currently in Pain: Yes  Pain Assessment  Pain Assessment: 0-10  Pain Level: 4  Pain Location: Knee  Pain Orientation: Right  Pain Descriptors: Aching;Constant;Dull; Sharp  Pain Frequency: Continuous  Vital Signs  Patient Currently in Pain: Yes       Treatment Activities:   Exercises  Exercise 1: leg press BLE 35# 3x10  Exercise 2: bike hole 3 5'  Exercise 3: walking free motion machine 13# 3x  Exercise 4: sitting ext stretch 30\" hold 5x  Exercise 5: slant board 3x30\"  Exercise 6: step up 81/2\" F/L 10x  Exercise 8: lunge on step up 5x hold 30\"  Exercise 9: 4 way hip free motion machine 10# BLE 10x  Exercise 10: leg ext BLE 5# 3x10  Exercise 11: TKE R free motion machine 10# 30x  Exercise 13:  Total Gym L10 squat/heel raises 3x10     Modalities  Cryotherapy (Minutes\Location): IP R knee 20 min supine  E-stim (parameters): IFC R knee 20 min     Assessment:   Conditions Requiring Skilled Therapeutic Intervention  REQUIRES PT FOLLOW UP: Yes     Plan:    Plan  Times per week: 2x/week  Current Treatment Recommendations: Strengthening, ROM, Modalities, Home Exercise Program  Plan Comment: to continue PT per POC  Timed Code Treatment Minutes: 30 Minutes   Treatment Charges: Minutes Units   []  Ultrasound     [x]  Electrical-Stim 20 1   []  Iontophoresis     []  Traction     []  Massage       []  Eval     []  Gait     [x]  Ther Exercise 30  2    []  Manual Therapy       []  Ther Activities       []  Aquatics     []  Vasopneumatic Device     []  Neuro Re-Ed       []  Other       Total Treatment Time: 50 3        Therapy Time   Individual Concurrent Group Co-treatment   Time In 1100         Time Out 1150         Minutes 50         Timed Code Treatment Minutes: 30 Minutes     Electronically signed by: Erin Alvarado, PT

## 2018-07-16 ENCOUNTER — HOSPITAL ENCOUNTER (OUTPATIENT)
Dept: PHYSICAL THERAPY | Age: 38
Setting detail: THERAPIES SERIES
Discharge: HOME | End: 2018-07-16
Payer: COMMERCIAL

## 2018-07-16 PROCEDURE — 97110 THERAPEUTIC EXERCISES: CPT

## 2018-07-16 ASSESSMENT — PAIN DESCRIPTION - ORIENTATION: ORIENTATION: RIGHT;ANTERIOR

## 2018-07-16 ASSESSMENT — PAIN DESCRIPTION - DESCRIPTORS: DESCRIPTORS: CONSTANT;ACHING;DULL;SHARP

## 2018-07-16 ASSESSMENT — PAIN SCALES - GENERAL: PAINLEVEL_OUTOF10: 4

## 2018-07-16 ASSESSMENT — PAIN DESCRIPTION - FREQUENCY: FREQUENCY: CONTINUOUS

## 2018-07-16 ASSESSMENT — PAIN DESCRIPTION - LOCATION: LOCATION: KNEE

## 2018-07-23 ENCOUNTER — OFFICE VISIT (OUTPATIENT)
Dept: ORTHOPEDIC SURGERY | Age: 38
End: 2018-07-23

## 2018-07-23 ENCOUNTER — HOSPITAL ENCOUNTER (OUTPATIENT)
Dept: PHYSICAL THERAPY | Age: 38
Setting detail: THERAPIES SERIES
Discharge: HOME | End: 2018-07-23
Payer: COMMERCIAL

## 2018-07-23 DIAGNOSIS — M17.11 ARTHRITIS OF RIGHT KNEE: Primary | ICD-10-CM

## 2018-07-23 DIAGNOSIS — Z96.651 S/P RIGHT UNICOMPARTMENTAL KNEE REPLACEMENT: ICD-10-CM

## 2018-07-23 PROCEDURE — 99024 POSTOP FOLLOW-UP VISIT: CPT | Performed by: ORTHOPAEDIC SURGERY

## 2018-07-23 PROCEDURE — 97110 THERAPEUTIC EXERCISES: CPT

## 2018-07-23 ASSESSMENT — ENCOUNTER SYMPTOMS
VOMITING: 0
ABDOMINAL PAIN: 0
CHEST TIGHTNESS: 0
COUGH: 0
APNEA: 0

## 2018-07-23 NOTE — PROGRESS NOTES
agreement. Objective :   General: Milan Lo is a 42 y.o. female who is alert and oriented and sitting comfortably in our office. Ortho Exam  MS:   Walks with bent knee gait but active Range of motion 0-110 to the right knee. Incision healing well to the right knee. Patient ambulates with no significant antalgia to the right lower extremity. Motor, sensory, vascular examination right lower extremity is grossly intact without focal deficits. Neuro: alert. oriented  Eyes: Extra-ocular muscles intact  Mouth: Oral mucosa moist. No perioral lesions  Pulm: Respirations unlabored and regular. Skin: warm, well perfused  Psych:   Patient has good fund of knowledge and displays understanging of exam, diagnosis, and plan. Assessment:      1. Arthritis of right knee    2. S/P right unicompartmental knee replacement         Plan:     Discussed with the patient that she should keep working with physical therapy and when her swelling is down she should gain a few more degrees. The patient should continue with physical therapy for 2-4 weeks then can start her own HEP. The patient voices understanding. Discussed with the patient that the soreness she feels will go away over time with strengthening. The patient should take ibuprofen before bed for the pain and patient states that she will try to do that. The patient should continue her normal activities as tolerates. The patient should remain off of work until her next appointment in 4 weeks. The patient should call the office with any questions or concerns. Follow up:Return in about 4 weeks (around 8/20/2018). No orders of the defined types were placed in this encounter.       Orders Placed This Encounter   Procedures    Ambulatory referral to Physical Therapy     Referral Priority:   Routine     Referral Type:   Eval and Treat     Referral Reason:   Specialty Services Required     Requested Specialty:   Physical Therapy     Number of Visits Requested:   [address] Brisen[address] LPN am scribing for and in the presence of Dr. Abbie Ayoub  7/23/2018 7:30 PM    I have reviewed and made changes accordingly to the work scribed by Akhil Odell LPN. The documentation accurately reflects work and decisions made by me. I have also reviewed documentation completed by clinical staff.     Abbie Ayoub DO, [facility]  7/23/2018 7:31 PM     This note is created with the assistance of a speech recognition program.  While intending to generate a document that actually reflects the content of the visit, the document can still have some errors including those of syntax and sound a like substitutions which may escape proof reading.  It such instances, actual meaning can be extrapolated by contextual diversion      Electronically signed by Shahbaz Renae DO, FAOAO on 7/23/2018 at 7:30 PM

## 2018-07-23 NOTE — PROGRESS NOTES
Physical Therapy  Daily Treatment Note  Date: 2018  Patient Name: Chuck Cavanaugh  MRN: [id number]     :   1980    Subjective:      PT Visit Information  Onset Date: 06/15/16  PT Insurance Information: MMO  Total # of Visits Approved: 28  Total # of Visits to Date: 14  Subjective  Subjective: no complains of pain today on R knee  General Comment  Comments: new script received today to continue PT 2x4 after seeing Dr Kristina Marrero Screening  Patient Currently in Pain: No  Vital Signs  Patient Currently in Pain: No       Treatment Activities:   Exercises  Exercise 1: leg press BLE 35# 3x10  Exercise 2: bike hole 4 5'  Exercise 3: walking free motion machine 13# 3x  Exercise 4: sitting ext stretch 30\" hold 5x  Exercise 5: slant board 3x30\"  Exercise 6: step up 6\" F/L 10x  Exercise 7: leg curls 35# BLE 3x10  Exercise 8: lunge on step up 5x hold 30\"  Exercise 9: 4 way hip free motion machine 10# BLE 10x  Exercise 10: leg ext BLE 10# 3x10  Exercise 13:  Total Gym L10 squat/heel raises 3x10     Assessment:   Conditions Requiring Skilled Therapeutic Intervention  REQUIRES PT FOLLOW UP: Yes     Plan:    Plan  Times per week: 2x/week  Current Treatment Recommendations: Strengthening, ROM, Modalities, Home Exercise Program  Plan Comment: to continue PT per POC  Timed Code Treatment Minutes: 45 Minutes   Treatment Charges: Minutes Units   []  Ultrasound     []  Electrical-Stim     []  Iontophoresis     []  Traction     []  Massage       []  Eval     []  Gait     [x]  Ther Exercise  45 3    []  Manual Therapy       []  Ther Activities       []  Aquatics     []  Vasopneumatic Device     []  Neuro Re-Ed       []  Other       Total Treatment Time: 45 3        Therapy Time   Individual Concurrent Group Co-treatment   Time In 1100         Time Out 1145         Minutes 45         Timed Code Treatment Minutes: 39 Minutes     Electronically signed by: Neyda Moser PT

## 2018-07-26 ENCOUNTER — APPOINTMENT (OUTPATIENT)
Dept: PHYSICAL THERAPY | Age: 38
End: 2018-07-26
Payer: COMMERCIAL

## 2018-07-27 ENCOUNTER — HOSPITAL ENCOUNTER (OUTPATIENT)
Dept: PHYSICAL THERAPY | Age: 38
Setting detail: THERAPIES SERIES
Discharge: HOME | End: 2018-07-27
Payer: COMMERCIAL

## 2018-07-27 PROCEDURE — 97110 THERAPEUTIC EXERCISES: CPT

## 2018-07-27 NOTE — PROGRESS NOTES
Physical Therapy  Daily Treatment Note  Date: 2018  Patient Name: Princess Baldwin  MRN: [id number]     :   1980    Subjective:      PT Visit Information  Onset Date: 06/15/16  PT Insurance Information: MMO  Total # of Visits Approved: 28  Total # of Visits to Date: 15  Subjective  Subjective: no complains of pain today on R knee  Pain Screening  Patient Currently in Pain: No  Vital Signs  Patient Currently in Pain: No       Treatment Activities:   Exercises  Exercise 1: leg press BLE 35# 3x10  Exercise 2: bike hole 4 5'  Exercise 3: walking free motion machine 17# 3x  Exercise 5: slant board 3x30\"  Exercise 6: step up 6\" F/L 10x  Exercise 7: leg curls 35# BLE 3x10  Exercise 8: lunge on step up 5x hold 30\"  Exercise 9: 4 way hip free motion machine 13# BLE 10x  Exercise 10: leg ext BLE 10# 3x10  Exercise 11: TKE R free motion machine 13# 30x  Exercise 12: eccentric R quads 41/2\" 3x10  Exercise 13:  Total Gym L10 squat/heel raises 3x10  Exercise 14: Grid lunge R 3x     Assessment:   Conditions Requiring Skilled Therapeutic Intervention  Assessment: progress with ex  REQUIRES PT FOLLOW UP: Yes     Plan:    Plan  Times per week: 2x/week  Current Treatment Recommendations: Strengthening, ROM, Modalities, Home Exercise Program  Plan Comment: to continue PT per POC  Timed Code Treatment Minutes: 45 Minutes   Treatment Charges: Minutes Units   []  Ultrasound     []  Electrical-Stim     []  Iontophoresis     []  Traction     []  Massage       []  Eval     []  Gait     [x]  Ther Exercise 45   3   []  Manual Therapy       []  Ther Activities       []  Aquatics     []  Vasopneumatic Device     []  Neuro Re-Ed       []  Other       Total Treatment Time: 45 3        Therapy Time   Individual Concurrent Group Co-treatment   Time In 930         Time Out 1015         Minutes 45         Timed Code Treatment Minutes: 39 Minutes     Electronically signed by: Soco Jimenez, PT

## 2018-07-30 ENCOUNTER — HOSPITAL ENCOUNTER (OUTPATIENT)
Dept: PHYSICAL THERAPY | Age: 38
Setting detail: THERAPIES SERIES
Discharge: HOME | End: 2018-07-30
Payer: COMMERCIAL

## 2018-07-30 PROCEDURE — 97110 THERAPEUTIC EXERCISES: CPT

## 2018-08-02 ENCOUNTER — HOSPITAL ENCOUNTER (OUTPATIENT)
Dept: PHYSICAL THERAPY | Age: 38
Setting detail: THERAPIES SERIES
Discharge: HOME | End: 2018-08-02
Payer: COMMERCIAL

## 2018-08-02 PROCEDURE — 97110 THERAPEUTIC EXERCISES: CPT

## 2018-08-02 NOTE — PROGRESS NOTES
Physical Therapy  Daily Treatment Note  Date: 2018  Patient Name: Franky Hull  MRN: [id number]     :   1980    Subjective:      PT Visit Information  Onset Date: 06/15/16  PT Insurance Information: MMO  Total # of Visits Approved: 28  Total # of Visits to Date: 17  Subjective  Subjective: no complains of pain today on R knee  Pain Screening  Patient Currently in Pain: No  Vital Signs  Patient Currently in Pain: No       Treatment Activities:   Exercises  Exercise 1: leg press BLE 35# 3x10  Exercise 2: bike hole 3 5'  Exercise 3: walking free motion machine 17# 3x  Exercise 4: sitting ext stretch 30\" hold 5x  Exercise 5: slant board 3x30\"  Exercise 6: step up 8\" F/L 10x  Exercise 7: leg curls 35# BLE 3x10  Exercise 8: lunge on step up 5x hold 30\"  Exercise 9: 4 way hip free motion machine 13# BLE 10x  Exercise 10: leg ext BLE 10# 3x10  Exercise 11: TKE R free motion machine 13# 30x  Exercise 12: eccentric R quads 41/2\" 3x10  Exercise 13:  Total Gym L10 squat/heel raises 3x10  Exercise 14: Grid lunge R 3x     Assessment:   Conditions Requiring Skilled Therapeutic Intervention  Assessment: progress with ex  REQUIRES PT FOLLOW UP: Yes  Discharge Recommendations: Home independently     Plan:    Plan  Times per week: 2x/week  Current Treatment Recommendations: Strengthening, ROM, Modalities, Home Exercise Program  Plan Comment: to continue PT per POC  Timed Code Treatment Minutes: 45 Minutes   Treatment Charges: Minutes Units   []  Ultrasound     []  Electrical-Stim     []  Iontophoresis     []  Traction     []  Massage       []  Eval     []  Gait     [x]  Ther Exercise 45  3    []  Manual Therapy       []  Ther Activities       []  Aquatics     []  Vasopneumatic Device     []  Neuro Re-Ed       []  Other       Total Treatment Time: 45 3        Therapy Time   Individual Concurrent Group Co-treatment   Time In 0900         Time Out 0945         Minutes 45         Timed Code Treatment Minutes: 45 Minutes

## 2018-08-06 ENCOUNTER — HOSPITAL ENCOUNTER (OUTPATIENT)
Dept: PHYSICAL THERAPY | Age: 38
Setting detail: THERAPIES SERIES
Discharge: HOME | End: 2018-08-06
Payer: COMMERCIAL

## 2018-08-06 PROCEDURE — 97110 THERAPEUTIC EXERCISES: CPT

## 2018-08-09 ENCOUNTER — APPOINTMENT (OUTPATIENT)
Dept: PHYSICAL THERAPY | Age: 38
End: 2018-08-09
Payer: COMMERCIAL

## 2018-08-22 NOTE — PROGRESS NOTES
Physical Therapy  Daily Treatment Note  Date: 2018  Patient Name: Ricardo Stratton  MRN: [id number]     :   1980    Subjective:      PT Visit Information  Onset Date: 06/15/16  PT Insurance Information: MMO  Total # of Visits Approved: 28  Total # of Visits to Date: 18  Subjective  Subjective: no complains of pain today on R knee  Pain Screening  Patient Currently in Pain: No  Vital Signs  Patient Currently in Pain: No       Treatment Activities:   Exercises  Exercise 1: leg press BLE 35# 3x10  Exercise 2: bike hole 3 5'  Exercise 3: walking free motion machine 17# 3x  Exercise 4: sitting ext stretch 30\" hold 5x  Exercise 5: slant board 3x30\"  Exercise 6: step up 8\" F/L 10x  Exercise 7: leg curls 35# BLE 3x10  Exercise 8: lunge on step up 5x hold 30\"  Exercise 9: 4 way hip free motion machine 17# BLE 10x  Exercise 10: leg ext BLE 10# 3x10  Exercise 11: TKE R free motion machine 17# 30x  Exercise 12: eccentric R quads 41/2\" 3x10  Exercise 13:  Total Gym L10 squat/heel raises 3x10  Exercise 14: Grid lunge R 3x    Assessment:   Conditions Requiring Skilled Therapeutic Intervention  Assessment: progress with ex  REQUIRES PT FOLLOW UP: Yes     Plan:    Plan  Times per week: 2x/week  Current Treatment Recommendations: Strengthening, ROM, Modalities, Home Exercise Program  Plan Comment: to continue PT per POC  Timed Code Treatment Minutes: 45 Minutes   Treatment Charges: Minutes Units   []  Ultrasound     []  Electrical-Stim     []  Iontophoresis     []  Traction     []  Massage       []  Eval     []  Gait     [x]  Ther Exercise 45  3    []  Manual Therapy       []  Ther Activities       []  Aquatics     []  Vasopneumatic Device     []  Neuro Re-Ed       []  Other       Total Treatment Time: 45 3        Therapy Time   Individual Concurrent Group Co-treatment   Time In 1000         Time Out 0939         Minutes 45         Timed Code Treatment Minutes: 45 Minutes     Electronically signed by: Vincenzo Romero
Phosphate 40-80 mAmin  [] Aquatic Therapy                                            [] Other:                   If you have any questions or concerns regarding this patient's care, please contact us.    Thank you for your referral.      Electronically signed by: Kaleb Hanson PT

## 2018-08-24 ENCOUNTER — OFFICE VISIT (OUTPATIENT)
Dept: ORTHOPEDIC SURGERY | Age: 38
End: 2018-08-24

## 2018-08-24 VITALS — WEIGHT: 192 LBS | BODY MASS INDEX: 32.78 KG/M2 | HEIGHT: 64 IN

## 2018-08-24 DIAGNOSIS — M17.11 ARTHRITIS OF RIGHT KNEE: Primary | ICD-10-CM

## 2018-08-24 DIAGNOSIS — Z96.651 S/P RIGHT UNICOMPARTMENTAL KNEE REPLACEMENT: ICD-10-CM

## 2018-08-24 PROCEDURE — 99024 POSTOP FOLLOW-UP VISIT: CPT | Performed by: ORTHOPAEDIC SURGERY

## 2018-08-24 ASSESSMENT — ENCOUNTER SYMPTOMS
COUGH: 0
CONSTIPATION: 0
NAUSEA: 0
DIARRHEA: 0

## 2018-08-24 NOTE — PROGRESS NOTES
right knee is noted. Patient relates without antalgia. Neuro: alert. oriented  Eyes: Extra-ocular muscles intact  Mouth: Oral mucosa moist. No perioral lesions  Pulm: Respirations unlabored and regular. Skin: warm, well perfused  Psych:   Patient has good fund of knowledge and displays understanging of exam, diagnosis, and plan. Assessment:      1. Arthritis of right knee    2. S/P right unicompartmental knee replacement         Plan:      Patient is doing well. She should continue with activities as tolerated. Patient may return to work without restrictions. Patient was given a Handicap placard for 6 months. Follow up 1 year with x-rays. The was patient was instruction to call the office immediately with any problems. Follow up:Return in about 1 year (around 8/24/2019) for x rays. Orders Placed This Encounter   Medications    Handicap Placard MISC     Sig: by Does not apply route Duration 6 months     Dispense:  1 each     Refill:  0       No orders of the defined types were placed in this encounter. Zhen Bull MA am scribing for and in the presence of Dr. Yana Hartman  8/27/2018 1:12 PM    I have reviewed and made changes accordingly to the work scribed by Luis Miguel Barraza MA. The documentation accurately reflects work and decisions made by me. I have also reviewed documentation completed by clinical staff.     Yana Hartman DO, [address] [facility] Medicine  8/27/2018 1:12 PM     This note is created with the assistance of a speech recognition program.  While intending to generate a document that actually reflects the content of the visit, the document can still have some errors including those of syntax and sound a like substitutions which may escape proof reading.  In such instances, actual meaning can be extrapolated by contextual diversion        Electronically signed by August Nava on 8/27/2018 at 1:12 PM

## 2018-08-27 ENCOUNTER — PATIENT MESSAGE (OUTPATIENT)
Dept: INTERNAL MEDICINE CLINIC | Age: 38
End: 2018-08-27

## 2018-08-31 ENCOUNTER — OFFICE VISIT (OUTPATIENT)
Dept: INTERNAL MEDICINE CLINIC | Age: 38
End: 2018-08-31
Payer: COMMERCIAL

## 2018-08-31 VITALS
WEIGHT: 187 LBS | SYSTOLIC BLOOD PRESSURE: 122 MMHG | BODY MASS INDEX: 31.92 KG/M2 | HEIGHT: 64 IN | DIASTOLIC BLOOD PRESSURE: 76 MMHG

## 2018-08-31 DIAGNOSIS — R63.4 WEIGHT LOSS: ICD-10-CM

## 2018-08-31 DIAGNOSIS — Z09 POSTOP CHECK: Primary | ICD-10-CM

## 2018-08-31 PROCEDURE — 99213 OFFICE O/P EST LOW 20 MIN: CPT | Performed by: INTERNAL MEDICINE

## 2018-09-05 DIAGNOSIS — Z96.651 S/P RIGHT UNICOMPARTMENTAL KNEE REPLACEMENT: Primary | ICD-10-CM

## 2018-09-05 RX ORDER — AMOXICILLIN 500 MG/1
CAPSULE ORAL
Qty: 4 CAPSULE | Refills: 0 | Status: SHIPPED | OUTPATIENT
Start: 2018-09-05 | End: 2019-03-26 | Stop reason: SDUPTHER

## 2018-09-09 ENCOUNTER — PATIENT MESSAGE (OUTPATIENT)
Dept: INTERNAL MEDICINE CLINIC | Age: 38
End: 2018-09-09

## 2018-09-11 ENCOUNTER — TELEPHONE (OUTPATIENT)
Dept: INTERNAL MEDICINE CLINIC | Age: 38
End: 2018-09-11

## 2018-10-03 ENCOUNTER — NURSE ONLY (OUTPATIENT)
Dept: INTERNAL MEDICINE CLINIC | Age: 38
End: 2018-10-03

## 2018-10-03 VITALS — BODY MASS INDEX: 30.38 KG/M2 | WEIGHT: 177 LBS

## 2018-10-03 DIAGNOSIS — R63.4 WEIGHT LOSS: ICD-10-CM

## 2018-10-03 DIAGNOSIS — E66.9 CLASS 1 OBESITY WITH BODY MASS INDEX (BMI) OF 30.0 TO 30.9 IN ADULT, UNSPECIFIED OBESITY TYPE, UNSPECIFIED WHETHER SERIOUS COMORBIDITY PRESENT: Primary | ICD-10-CM

## 2018-10-04 ENCOUNTER — TELEPHONE (OUTPATIENT)
Dept: GASTROENTEROLOGY | Age: 38
End: 2018-10-04

## 2018-10-31 ENCOUNTER — TELEPHONE (OUTPATIENT)
Dept: GASTROENTEROLOGY | Age: 38
End: 2018-10-31

## 2018-11-06 ENCOUNTER — PATIENT MESSAGE (OUTPATIENT)
Dept: INTERNAL MEDICINE CLINIC | Age: 38
End: 2018-11-06

## 2018-11-09 ENCOUNTER — NURSE ONLY (OUTPATIENT)
Dept: INTERNAL MEDICINE CLINIC | Age: 38
End: 2018-11-09

## 2018-11-09 VITALS — BODY MASS INDEX: 29.25 KG/M2 | WEIGHT: 170.4 LBS

## 2018-11-09 DIAGNOSIS — E66.9 CLASS 1 OBESITY WITH BODY MASS INDEX (BMI) OF 30.0 TO 30.9 IN ADULT, UNSPECIFIED OBESITY TYPE, UNSPECIFIED WHETHER SERIOUS COMORBIDITY PRESENT: Primary | ICD-10-CM

## 2019-03-06 DIAGNOSIS — F32.A DEPRESSION, UNSPECIFIED DEPRESSION TYPE: ICD-10-CM

## 2019-03-08 ENCOUNTER — PATIENT MESSAGE (OUTPATIENT)
Dept: INTERNAL MEDICINE CLINIC | Age: 39
End: 2019-03-08

## 2019-03-08 RX ORDER — DULOXETIN HYDROCHLORIDE 60 MG/1
CAPSULE, DELAYED RELEASE ORAL
Qty: 90 CAPSULE | Refills: 3 | Status: SHIPPED | OUTPATIENT
Start: 2019-03-08 | End: 2020-05-18 | Stop reason: SDUPTHER

## 2019-03-26 DIAGNOSIS — Z96.651 S/P RIGHT UNICOMPARTMENTAL KNEE REPLACEMENT: ICD-10-CM

## 2019-03-26 RX ORDER — AMOXICILLIN 500 MG/1
CAPSULE ORAL
Qty: 4 CAPSULE | Refills: 0 | Status: SHIPPED | OUTPATIENT
Start: 2019-03-26 | End: 2019-04-05 | Stop reason: ALTCHOICE

## 2019-04-05 ENCOUNTER — OFFICE VISIT (OUTPATIENT)
Dept: ORTHOPEDIC SURGERY | Age: 39
End: 2019-04-05
Payer: COMMERCIAL

## 2019-04-05 VITALS — BODY MASS INDEX: 29.53 KG/M2 | HEIGHT: 64 IN | WEIGHT: 173 LBS

## 2019-04-05 DIAGNOSIS — M25.562 LEFT KNEE PAIN, UNSPECIFIED CHRONICITY: ICD-10-CM

## 2019-04-05 DIAGNOSIS — M25.561 RIGHT KNEE PAIN, UNSPECIFIED CHRONICITY: ICD-10-CM

## 2019-04-05 DIAGNOSIS — Z96.651 S/P RIGHT UNICOMPARTMENTAL KNEE REPLACEMENT: Primary | ICD-10-CM

## 2019-04-05 DIAGNOSIS — M17.12 ARTHRITIS OF LEFT KNEE: ICD-10-CM

## 2019-04-05 PROCEDURE — 99213 OFFICE O/P EST LOW 20 MIN: CPT | Performed by: ORTHOPAEDIC SURGERY

## 2019-04-05 RX ORDER — COVID-19 ANTIGEN TEST
220 KIT MISCELLANEOUS
COMMUNITY
End: 2021-06-18 | Stop reason: ALTCHOICE

## 2019-04-05 RX ORDER — CELECOXIB 200 MG/1
200 CAPSULE ORAL 2 TIMES DAILY
Qty: 60 CAPSULE | Refills: 2 | Status: SHIPPED | OUTPATIENT
Start: 2019-04-05 | End: 2019-09-20 | Stop reason: SDUPTHER

## 2019-04-05 ASSESSMENT — ENCOUNTER SYMPTOMS
SHORTNESS OF BREATH: 0
BACK PAIN: 0
WHEEZING: 0

## 2019-04-05 NOTE — PROGRESS NOTES
who is alert and oriented and sitting comfortably in our office. Ortho Exam  MS:  Right knee AROM full ROM. Tenderness right proximal tibia noted. No signs of infection right knee is appreciated. No significant knee effusion is noted on right. Patient relates without significant antalgia or short weightbearing phase to the bilateral lower extremities. Negative hip logroll and Stinchfield test on the right is appreciated. Patient's full range of motion right ankle. Motor, sensory, vascular examination right lower extremity is grossly intact without focal deficits. Evaluation of the Left knee reveals no significant outward deformity. There is no erythema, warmth, skin lesions, signs of infection. There is tenderness over the medial joint line. There is a mildknee effusion. Range of motion of the Left knee is  full. No instability of the knee is appreciated at 0 and 30° of flexion. There is a negative anterior drawer Lachman's test.  There is increased pain with varus Ofelia's testing. No calf tenderness is noted. There is a negative hip log roll and Stinchfield test.  Motor, sensory, vascular examination to the Left lower extremity is intact. Patient has full range of motion of the ankle. Neuro: alert and oriented to person and place. Eyes: Extra-ocular muscles intact  Mouth: Oral mucosa moist. No perioral lesions  Pulm: Respirations unlabored and regular. Symmetric chest excursion without outward deformity is noted. Skin: warm, well perfused  Psych:   Patient has good fund of knowledge and displays understanging of exam, diagnosis, and plan. Radiology:     Xr Knee Right (3 Views)    Result Date: 4/5/2019  History: Right knee pain Findings: Standing AP, lateral, merchant view x-rays of right knee done the office today shows right medial unicompartmental knee arthroplasty with also gross signs of loosening. Femoral component positioning is unchanged alignment from previous x-rays. Tibial component shows mild lucency around the cement mantle but no gross signs of loosening. Minimal degenerative changes lateral joint line or appreciated. No evidence of fracture, subluxation, dislocation, radiopaque foreign body, radiopaque tumor is noted. No excessive polyethylene wear is appreciated. Impression: Right knee medial unicompartmental knee arthroplasty as described above. Xr Knee Left (min 4 Views)    Result Date: 4/5/2019  History: Right knee pain. Findings: Standing AP, lateral, merchant view x-rays of the right knee done in the office today shows medial unicompartmental knee arthroplasty unchanged alignment from previous examination. Mild lucent lines in the area of the tibial cement/bone interface is appreciated but no gross signs of loosening or noted. Malalignment of the femoral component is also appreciated but again unchanged alignment from previous examination. No excessive polyethylene wear is appreciated. Minimal degenerative changes lateral compartment is appreciated. Impression: Right medial unicompartmental knee arthroplasty as described above History: Left knee pain. Findings: Standing AP, lateral, merchant, tunnel view x-rays of left knee done the office today shows mild medial joint space narrowing with mild periarticular osteophytosis and medial joint line sclerosis. No evidence of fracture, subluxation, dislocation, radiopaque tumors noted. Impression: Mild left knee degenerative changes as described above. Assessment:      1. S/P right unicompartmental knee replacement    2. Right knee pain, unspecified chronicity    3. Arthritis of left knee    4. Left knee pain, unspecified chronicity       Plan:      Discussed etiology and natural history of bilateral knee pain. The treatment options may include oral anti-inflammatories, bracing, injections, advanced imaging, activity modification, physical therapy and/or surgical intervention.  The patient would like to content of the visit, the document can still have some errors including those of syntax and sound a like substitutions which may escape proof reading.  In such instances, actual meaning can be extrapolated by contextual diversion      Electronically signed by Shabnam Thacker DO, FAOAO on 4/8/2019 at 8:07 AM

## 2019-05-24 ENCOUNTER — OFFICE VISIT (OUTPATIENT)
Dept: ORTHOPEDIC SURGERY | Age: 39
End: 2019-05-24
Payer: COMMERCIAL

## 2019-05-24 VITALS — WEIGHT: 173.06 LBS | BODY MASS INDEX: 29.55 KG/M2 | HEIGHT: 64 IN

## 2019-05-24 DIAGNOSIS — Z96.651 S/P RIGHT UNICOMPARTMENTAL KNEE REPLACEMENT: Primary | ICD-10-CM

## 2019-05-24 DIAGNOSIS — M17.11 ARTHRITIS OF RIGHT KNEE: ICD-10-CM

## 2019-05-24 DIAGNOSIS — M17.12 ARTHRITIS OF LEFT KNEE: ICD-10-CM

## 2019-05-24 DIAGNOSIS — M23.92 INTERNAL DERANGEMENT OF LEFT KNEE: ICD-10-CM

## 2019-05-24 PROCEDURE — 99213 OFFICE O/P EST LOW 20 MIN: CPT | Performed by: ORTHOPAEDIC SURGERY

## 2019-05-24 PROCEDURE — 20610 DRAIN/INJ JOINT/BURSA W/O US: CPT | Performed by: ORTHOPAEDIC SURGERY

## 2019-05-24 RX ORDER — BUPIVACAINE HYDROCHLORIDE 2.5 MG/ML
2 INJECTION, SOLUTION INFILTRATION; PERINEURAL ONCE
Status: COMPLETED | OUTPATIENT
Start: 2019-05-24 | End: 2019-05-24

## 2019-05-24 RX ORDER — METHYLPREDNISOLONE ACETATE 80 MG/ML
80 INJECTION, SUSPENSION INTRA-ARTICULAR; INTRALESIONAL; INTRAMUSCULAR; SOFT TISSUE ONCE
Status: COMPLETED | OUTPATIENT
Start: 2019-05-24 | End: 2019-05-24

## 2019-05-24 RX ADMIN — METHYLPREDNISOLONE ACETATE 80 MG: 80 INJECTION, SUSPENSION INTRA-ARTICULAR; INTRALESIONAL; INTRAMUSCULAR; SOFT TISSUE at 15:38

## 2019-05-24 RX ADMIN — BUPIVACAINE HYDROCHLORIDE 5 MG: 2.5 INJECTION, SOLUTION INFILTRATION; PERINEURAL at 15:37

## 2019-05-24 ASSESSMENT — ENCOUNTER SYMPTOMS
CHEST TIGHTNESS: 0
VOMITING: 0
COUGH: 0
ABDOMINAL PAIN: 0
APNEA: 0

## 2019-05-24 NOTE — PROGRESS NOTES
[address] [place] [address]  Dept: [phone]  Dept Fax: [phone]        Ambulatory Follow Up      Subjective:   Selina Bonilla is a 34y.o. year old female who presents to our office today for routine followup regarding her   1. S/P right unicompartmental knee replacement    2. Arthritis of left knee    3. Internal derangement of left knee    4. Arthritis of right knee    . Chief Complaint   Patient presents with    Knee Pain     bilateral       HPI- The patient is in the office today in follow up for left knee pain and right unicompartmental medial knee with navio assistance. DOS being 6/12/18. The patient notes locking to the right knee with long periods of standing. Once the patient has her knee moving the pain goes away. The patient mentions that when she takes Celebrex her left knee is about 70% better. The patient notes the most pain to the left knee with bending and twisting. Review of Systems   Constitutional: Negative for chills and fever. Respiratory: Negative for apnea, cough and chest tightness. Cardiovascular: Negative for chest pain and palpitations. Gastrointestinal: Negative for abdominal pain and vomiting. Genitourinary: Negative for difficulty urinating. Musculoskeletal: Positive for arthralgias (Bilateral knees). Negative for gait problem, joint swelling and myalgias. Neurological: Negative for dizziness, weakness and numbness. I have reviewed the CC, HPI, ROS, PMH, FHX, Social History, and if not present in this note, I have reviewed in the patient's chart. I agree with the documentation provided by other staff and have reviewed their documentation prior to providing my signature indicating agreement. Objective :   Ht 5' 4.02\" (1.626 m)   Wt 173 lb 1 oz (78.5 kg)   BMI 29.69 kg/m²  Body mass index is 29.69 kg/m².   General: Selina Bonilla is a 34 y.o. female who is alert and oriented and sitting comfortably in our office. Ortho Exam  MS:  Full range of motion of the right knee. Evaluation of the Left knee shows no erythema, warmth, skin lesions, signs of infection. mild Knee effusion is appreciated. Patient has full range of motion of the knee. Tenderness over the medial joint line is appreciated. Patient has a negative patellar grind sign and a negative patellar apprehension sign. There is no instability with varus and valgus stress applied at 0 and 30° of flexion. A negative anterior drawer and Lachman's test is appreciated. There is increased pain with a medialMcMurray's test but no palpable click. There is no calf tenderness. There is a negative hip log-roll and Stinchfield test.  Motor, sensory, vascular examination to the Left lower extremity is intact without focal deficits. Neuro: alert and oriented to person and place. Eyes: Extra-ocular muscles intact  Mouth: Oral mucosa moist. No perioral lesions  Pulm: Respirations unlabored and regular. Symmetric chest excursion without outward deformity is noted. Skin: warm, well perfused  Psych:   Patient has good fund of knowledge and displays understanging of exam, diagnosis, and plan. Radiology:     No results found. KNEE INJECTION PROCEDURE NOTE:  The patient was identified. The right knee was confirmed with the patient. After a sterile prep with Betadine the knee was injected using a lateral joint line approach with a mixture of 2 mL of 0.25% Marcaine and 80 mg of Depo-Medrol. Patient tolerated the procedure well without post injection complications. I instructed the patient to call our office immediately if they have any swelling or increased pain at the injection site. Assessment:      1. S/P right unicompartmental knee replacement    2. Arthritis of left knee    3. Internal derangement of left knee    4.  Arthritis of right knee       Plan:      Discussed etiology and natural history of possible medial meniscus tear to the left knee. The treatment options may include oral anti-inflammatories, bracing, injections, advanced imaging, activity modification, physical therapy and/or surgical intervention. The patient would like to proceed with corticosteroid injection to the left knee. Patient tolerates the injection well. As for the right knee I would like to continue to monitor the patient due to her being functionally well. I would like her to continue working on strengthening of the right knee. The patient will follow up in the office in 6 months for x-rays of the right knee. We discussed that the patient should call us with any concerns or questions. Follow up:Return in about 6 months (around 11/24/2019) for Xrays of the right knee. Orders Placed This Encounter   Medications    methylPREDNISolone acetate (DEPO-MEDROL) injection 80 mg    bupivacaine (MARCAINE) 0.25 % injection 5 mg         Orders Placed This Encounter   Procedures    88706 - DRAIN/INJECT LARGE JOINT/BURSA     I, Vonnie Esparza LPN am scribing for and in the presence of Dr. Mundo Duque  5/29/2019 9:34 AM    I have reviewed and made changes accordingly to the work scribed by Vonnie Esparza LPN. The documentation accurately reflects work and decisions made by me. I have also reviewed documentation completed by clinical staff.     Mundo Duque DO, [facility] Medicine  5/29/2019 11:27 AM  This note is created with the assistance of a speech recognition program.  While intending to generate a document that actually reflects the content of the visit, the document can still have some errors including those of syntax and sound a like substitutions which may escape proof reading.  In such instances, actual meaning can be extrapolated by contextual diversion      Electronically signed by Domo Dupont on 5/29/2019 at 11:27 AM

## 2019-06-06 ENCOUNTER — PATIENT MESSAGE (OUTPATIENT)
Dept: INTERNAL MEDICINE CLINIC | Age: 39
End: 2019-06-06

## 2019-06-06 NOTE — TELEPHONE ENCOUNTER
From: Saroj Bear  To: Klarissa Worthy MD  Sent: 6/6/2019 1:04 PM EDT  Subject: Non-Urgent Medical Question    Sounds good. Thank you.     ----- Message -----  From: Dara Ricardo  Sent: 6/6/19, 10:22 AM  To: Saroj Bear  Subject: RE: Non-Urgent Medical Question    Jcarlos Deleon,     I sent a message to Dr. Elaine Barraza asking him to order Bills blood work. I can let you know when he has ordered it.       ----- Message -----   From: Saroj Bear   Sent: 6/6/2019 10:10 AM EDT   To: Klarissa Worthy MD  Subject: Non-Urgent Medical Question    Good morning. Mandie Florentino wanted to know if you could please order his yearly bloodwork so he could have that done and then make his appointment. He uses the [employer] on [facility] down by [address]. Thank you.

## 2019-06-06 NOTE — TELEPHONE ENCOUNTER
From: Dee Lyles  To: Beatriz James MD  Sent: 6/6/2019 10:10 AM EDT  Subject: Non-Urgent Medical Question    Good morning. Oneal Montejo wanted to know if you could please order his yearly bloodwork so he could have that done and then make his appointment. He uses the [employer] on [facility] down by [address]. Thank you.

## 2019-06-11 ENCOUNTER — PATIENT MESSAGE (OUTPATIENT)
Dept: INTERNAL MEDICINE CLINIC | Age: 39
End: 2019-06-11

## 2019-06-11 NOTE — TELEPHONE ENCOUNTER
From: Everlene Boast  To: Lacey Thomas MD  Sent: 6/11/2019 8:11 AM EDT  Subject: Non-Urgent Medical Question    Hans Argueta. Any word on Bills blood work?    ----- Message -----  From: Lisa Vasques  Sent: 6/6/19, 10:22 AM  To: Everlene Boast  Subject: RE: Non-Urgent Medical Question    Rip Campos,     I sent a message to Dr. Dorcas Cowart asking him to order Bills blood work. I can let you know when he has ordered it.       ----- Message -----   From: Everlene Boast   Sent: 6/6/2019 10:10 AM EDT   To: Lacey Thomas MD  Subject: Non-Urgent Medical Question    Good morning. Yair Siegel wanted to know if you could please order his yearly bloodwork so he could have that done and then make his appointment. He uses the [employer] [facility] on [facility] down by [address]. Thank you.

## 2019-09-11 ENCOUNTER — PATIENT MESSAGE (OUTPATIENT)
Dept: INTERNAL MEDICINE CLINIC | Age: 39
End: 2019-09-11

## 2019-09-20 DIAGNOSIS — M17.12 ARTHRITIS OF LEFT KNEE: ICD-10-CM

## 2019-09-20 DIAGNOSIS — Z96.651 S/P RIGHT UNICOMPARTMENTAL KNEE REPLACEMENT: ICD-10-CM

## 2019-09-20 RX ORDER — CELECOXIB 200 MG/1
CAPSULE ORAL
Qty: 60 CAPSULE | Refills: 2 | Status: SHIPPED | OUTPATIENT
Start: 2019-09-20 | End: 2020-06-01

## 2019-11-21 DIAGNOSIS — Z96.651 S/P RIGHT UNICOMPARTMENTAL KNEE REPLACEMENT: Primary | ICD-10-CM

## 2019-11-21 DIAGNOSIS — M17.12 ARTHRITIS OF LEFT KNEE: ICD-10-CM

## 2019-11-22 ENCOUNTER — OFFICE VISIT (OUTPATIENT)
Dept: ORTHOPEDIC SURGERY | Age: 39
End: 2019-11-22
Payer: COMMERCIAL

## 2019-11-22 VITALS — BODY MASS INDEX: 29.53 KG/M2 | WEIGHT: 173 LBS | HEIGHT: 64 IN

## 2019-11-22 DIAGNOSIS — M17.12 ARTHRITIS OF LEFT KNEE: Primary | ICD-10-CM

## 2019-11-22 DIAGNOSIS — M17.11 ARTHRITIS OF RIGHT KNEE: ICD-10-CM

## 2019-11-22 DIAGNOSIS — Z96.651 S/P RIGHT UNICOMPARTMENTAL KNEE REPLACEMENT: ICD-10-CM

## 2019-11-22 PROCEDURE — 99213 OFFICE O/P EST LOW 20 MIN: CPT | Performed by: ORTHOPAEDIC SURGERY

## 2019-11-22 ASSESSMENT — ENCOUNTER SYMPTOMS
CONSTIPATION: 0
DIARRHEA: 0
COUGH: 0
NAUSEA: 0

## 2020-03-04 ENCOUNTER — OFFICE VISIT (OUTPATIENT)
Dept: ORTHOPEDIC SURGERY | Age: 40
End: 2020-03-04
Payer: COMMERCIAL

## 2020-03-04 VITALS — HEIGHT: 64 IN | BODY MASS INDEX: 29.55 KG/M2 | WEIGHT: 173.06 LBS

## 2020-03-04 PROCEDURE — 20610 DRAIN/INJ JOINT/BURSA W/O US: CPT | Performed by: PHYSICIAN ASSISTANT

## 2020-03-04 PROCEDURE — 99213 OFFICE O/P EST LOW 20 MIN: CPT | Performed by: PHYSICIAN ASSISTANT

## 2020-03-04 RX ORDER — CELECOXIB 200 MG/1
200 CAPSULE ORAL 2 TIMES DAILY
Qty: 60 CAPSULE | Refills: 3 | Status: SHIPPED | OUTPATIENT
Start: 2020-03-04 | End: 2020-03-13 | Stop reason: SDUPTHER

## 2020-03-04 RX ORDER — METHYLPREDNISOLONE ACETATE 80 MG/ML
80 INJECTION, SUSPENSION INTRA-ARTICULAR; INTRALESIONAL; INTRAMUSCULAR; SOFT TISSUE ONCE
Status: COMPLETED | OUTPATIENT
Start: 2020-03-04 | End: 2020-03-06

## 2020-03-04 RX ORDER — BUPIVACAINE HYDROCHLORIDE 2.5 MG/ML
2 INJECTION, SOLUTION INFILTRATION; PERINEURAL ONCE
Status: COMPLETED | OUTPATIENT
Start: 2020-03-04 | End: 2020-03-06

## 2020-03-04 ASSESSMENT — ENCOUNTER SYMPTOMS
ABDOMINAL PAIN: 0
CHEST TIGHTNESS: 0
VOMITING: 0
APNEA: 0
COUGH: 0

## 2020-03-06 RX ADMIN — BUPIVACAINE HYDROCHLORIDE 5 MG: 2.5 INJECTION, SOLUTION INFILTRATION; PERINEURAL at 11:57

## 2020-03-06 RX ADMIN — METHYLPREDNISOLONE ACETATE 80 MG: 80 INJECTION, SUSPENSION INTRA-ARTICULAR; INTRALESIONAL; INTRAMUSCULAR; SOFT TISSUE at 11:58

## 2020-03-13 ENCOUNTER — OFFICE VISIT (OUTPATIENT)
Dept: FAMILY MEDICINE CLINIC | Age: 40
End: 2020-03-13
Payer: COMMERCIAL

## 2020-03-13 VITALS
WEIGHT: 201.8 LBS | RESPIRATION RATE: 18 BRPM | TEMPERATURE: 98.6 F | DIASTOLIC BLOOD PRESSURE: 70 MMHG | SYSTOLIC BLOOD PRESSURE: 112 MMHG | HEART RATE: 70 BPM | BODY MASS INDEX: 34.62 KG/M2

## 2020-03-13 PROBLEM — F41.9 ANXIETY: Status: ACTIVE | Noted: 2020-03-13

## 2020-03-13 PROBLEM — F32.A DEPRESSION: Status: ACTIVE | Noted: 2020-03-13

## 2020-03-13 PROCEDURE — 99214 OFFICE O/P EST MOD 30 MIN: CPT | Performed by: FAMILY MEDICINE

## 2020-03-13 PROCEDURE — G0444 DEPRESSION SCREEN ANNUAL: HCPCS | Performed by: FAMILY MEDICINE

## 2020-03-13 PROCEDURE — G8431 POS CLIN DEPRES SCRN F/U DOC: HCPCS | Performed by: FAMILY MEDICINE

## 2020-03-13 SDOH — ECONOMIC STABILITY: FOOD INSECURITY: WITHIN THE PAST 12 MONTHS, THE FOOD YOU BOUGHT JUST DIDN'T LAST AND YOU DIDN'T HAVE MONEY TO GET MORE.: NEVER TRUE

## 2020-03-13 SDOH — ECONOMIC STABILITY: FOOD INSECURITY: WITHIN THE PAST 12 MONTHS, YOU WORRIED THAT YOUR FOOD WOULD RUN OUT BEFORE YOU GOT MONEY TO BUY MORE.: NEVER TRUE

## 2020-03-13 SDOH — ECONOMIC STABILITY: INCOME INSECURITY: HOW HARD IS IT FOR YOU TO PAY FOR THE VERY BASICS LIKE FOOD, HOUSING, MEDICAL CARE, AND HEATING?: NOT HARD AT ALL

## 2020-03-13 ASSESSMENT — PATIENT HEALTH QUESTIONNAIRE - PHQ9
SUM OF ALL RESPONSES TO PHQ QUESTIONS 1-9: 3
8. MOVING OR SPEAKING SO SLOWLY THAT OTHER PEOPLE COULD HAVE NOTICED. OR THE OPPOSITE, BEING SO FIGETY OR RESTLESS THAT YOU HAVE BEEN MOVING AROUND A LOT MORE THAN USUAL: 0
1. LITTLE INTEREST OR PLEASURE IN DOING THINGS: 3
SUM OF ALL RESPONSES TO PHQ QUESTIONS 1-9: 10
1. LITTLE INTEREST OR PLEASURE IN DOING THINGS: 2
SUM OF ALL RESPONSES TO PHQ9 QUESTIONS 1 & 2: 2
2. FEELING DOWN, DEPRESSED OR HOPELESS: 0
7. TROUBLE CONCENTRATING ON THINGS, SUCH AS READING THE NEWSPAPER OR WATCHING TELEVISION: 1
3. TROUBLE FALLING OR STAYING ASLEEP: 3
2. FEELING DOWN, DEPRESSED OR HOPELESS: 0
10. IF YOU CHECKED OFF ANY PROBLEMS, HOW DIFFICULT HAVE THESE PROBLEMS MADE IT FOR YOU TO DO YOUR WORK, TAKE CARE OF THINGS AT HOME, OR GET ALONG WITH OTHER PEOPLE: 0
SUM OF ALL RESPONSES TO PHQ QUESTIONS 1-9: 10
5. POOR APPETITE OR OVEREATING: 1
SUM OF ALL RESPONSES TO PHQ9 QUESTIONS 1 & 2: 3
4. FEELING TIRED OR HAVING LITTLE ENERGY: 3
9. THOUGHTS THAT YOU WOULD BE BETTER OFF DEAD, OR OF HURTING YOURSELF: 0
SUM OF ALL RESPONSES TO PHQ QUESTIONS 1-9: 3

## 2020-03-13 ASSESSMENT — ENCOUNTER SYMPTOMS
ABDOMINAL PAIN: 0
BLOOD IN STOOL: 0
CHEST TIGHTNESS: 0
SHORTNESS OF BREATH: 0

## 2020-03-13 NOTE — PROGRESS NOTES
anxiety and has not been feeling more depressed or anxious over the past 6 months. She denies any chest pain, abdominal pain, shortness of breath, fever or chills. She states she has a good appetite and tries to remain active. Review of Systems   Constitutional: Positive for fatigue. Negative for chills and fever. HENT: Negative for congestion. Respiratory: Negative for chest tightness and shortness of breath. Cardiovascular: Negative for chest pain. Gastrointestinal: Negative for abdominal pain and blood in stool. Genitourinary: Negative for dysuria and hematuria. Skin: Negative for rash. Neurological: Negative for dizziness. Psychiatric/Behavioral: Positive for sleep disturbance. Negative for dysphoric mood. The patient is not nervous/anxious. Objective:   Physical Exam  Vitals signs and nursing note reviewed. Constitutional:       General: She is not in acute distress. Appearance: She is well-developed. HENT:      Head: Normocephalic and atraumatic. Right Ear: Tympanic membrane, ear canal and external ear normal.      Left Ear: Tympanic membrane, ear canal and external ear normal.      Nose: Nose normal.      Mouth/Throat:      Mouth: Mucous membranes are moist.      Pharynx: Oropharynx is clear. Eyes:      General: No scleral icterus. Right eye: No discharge. Left eye: No discharge. Conjunctiva/sclera: Conjunctivae normal.   Neck:      Musculoskeletal: Neck supple. Cardiovascular:      Rate and Rhythm: Normal rate and regular rhythm. Heart sounds: Normal heart sounds. Pulmonary:      Effort: Pulmonary effort is normal. No respiratory distress. Breath sounds: Normal breath sounds. No wheezing. Abdominal:      General: There is no distension. Palpations: Abdomen is soft. Tenderness: There is no abdominal tenderness. Skin:     General: Skin is warm and dry. Findings: No rash.    Neurological:      Mental Status: She is

## 2020-05-18 RX ORDER — DULOXETIN HYDROCHLORIDE 60 MG/1
CAPSULE, DELAYED RELEASE ORAL
Qty: 90 CAPSULE | Refills: 1 | Status: SHIPPED | OUTPATIENT
Start: 2020-05-18 | End: 2020-11-12

## 2020-05-27 LAB
BASOPHILS ABSOLUTE: 0.1 /ΜL
BASOPHILS RELATIVE PERCENT: 0.9 %
CHOLESTEROL, TOTAL: 199 MG/DL
CHOLESTEROL/HDL RATIO: 3.3
EOSINOPHILS ABSOLUTE: 0.2 /ΜL
EOSINOPHILS RELATIVE PERCENT: 3.4 %
HCT VFR BLD CALC: 41.4 % (ref 36–46)
HDLC SERPL-MCNC: 60 MG/DL (ref 35–70)
HEMOGLOBIN: 13.8 G/DL (ref 12–16)
LDL CHOLESTEROL CALCULATED: 105 MG/DL (ref 0–160)
LYMPHOCYTES ABSOLUTE: 2.1 /ΜL
LYMPHOCYTES RELATIVE PERCENT: 32.1 %
MCH RBC QN AUTO: 29.2 PG
MCHC RBC AUTO-ENTMCNC: 33.4 G/DL
MCV RBC AUTO: 88 FL
MONOCYTES ABSOLUTE: 0.4 /ΜL
MONOCYTES RELATIVE PERCENT: 5.6 %
NEUTROPHILS ABSOLUTE: 3.8 /ΜL
NEUTROPHILS RELATIVE PERCENT: 58 %
PDW BLD-RTO: 13.4 %
PLATELET # BLD: 277 K/ΜL
PMV BLD AUTO: 8.9 FL
RBC # BLD: 4.73 10^6/ΜL
TRIGL SERPL-MCNC: 172 MG/DL
TSH SERPL DL<=0.05 MIU/L-ACNC: 0.72 UIU/ML
VLDLC SERPL CALC-MCNC: 34 MG/DL
WBC # BLD: 6.5 10^3/ML

## 2020-06-01 RX ORDER — CELECOXIB 200 MG/1
CAPSULE ORAL
Qty: 60 CAPSULE | Refills: 2 | Status: SHIPPED | OUTPATIENT
Start: 2020-06-01 | End: 2020-12-08

## 2020-06-01 NOTE — TELEPHONE ENCOUNTER
Patient last seen on 3/4/2020 for left knee arthritis. The patient is asking for a refill on her Celebrex. Please advise, thank you.

## 2020-09-04 ENCOUNTER — PATIENT MESSAGE (OUTPATIENT)
Dept: FAMILY MEDICINE CLINIC | Age: 40
End: 2020-09-04

## 2020-09-04 NOTE — TELEPHONE ENCOUNTER
From: Akash Wilkinson  To: Maria E Crook MD  Sent: 9/4/2020 8:43 AM EDT  Subject: Non-Urgent Medical Question    Good morning. I have a spot in my chest that is raised, irregular in shape and is sore. It has been there for a little over a year but has recently started to flare up. I would like a recommendation for a dermatologist so I can have it looked at. Thank you.

## 2020-09-23 ENCOUNTER — EMPLOYEE WELLNESS (OUTPATIENT)
Dept: OTHER | Age: 40
End: 2020-09-23

## 2020-09-23 LAB
CHOLESTEROL/HDL RATIO: 3.1
CHOLESTEROL: 187 MG/DL
GLUCOSE BLD-MCNC: 100 MG/DL (ref 70–99)
HDLC SERPL-MCNC: 60 MG/DL
LDL CHOLESTEROL: 102 MG/DL (ref 0–130)
PATIENT FASTING?: YES
TRIGL SERPL-MCNC: 125 MG/DL
VLDLC SERPL CALC-MCNC: ABNORMAL MG/DL (ref 1–30)

## 2020-10-01 RX ORDER — AMOXICILLIN 500 MG/1
500 CAPSULE ORAL ONCE
Qty: 4 CAPSULE | Refills: 0 | Status: SHIPPED | OUTPATIENT
Start: 2020-10-22 | End: 2021-04-19

## 2020-11-12 RX ORDER — DULOXETIN HYDROCHLORIDE 60 MG/1
CAPSULE, DELAYED RELEASE ORAL
Qty: 90 CAPSULE | Refills: 1 | Status: SHIPPED | OUTPATIENT
Start: 2020-11-12 | End: 2021-05-11

## 2020-12-08 RX ORDER — CELECOXIB 200 MG/1
CAPSULE ORAL
Qty: 60 CAPSULE | Refills: 2 | Status: SHIPPED | OUTPATIENT
Start: 2020-12-08 | End: 2021-06-18 | Stop reason: SDUPTHER

## 2021-01-15 ENCOUNTER — HOSPITAL ENCOUNTER (OUTPATIENT)
Age: 41
Discharge: HOME | End: 2021-01-15
Payer: COMMERCIAL

## 2021-01-15 LAB
ESTRADIOL LEVEL: 7 PG/ML (ref 27–314)
FOLLICLE STIMULATING HORMONE: 64.1 U/L (ref 1.7–21.5)
GLUCOSE BLD-MCNC: 98 MG/DL (ref 70–99)

## 2021-01-15 PROCEDURE — 82670 ASSAY OF TOTAL ESTRADIOL: CPT

## 2021-01-15 PROCEDURE — 82947 ASSAY GLUCOSE BLOOD QUANT: CPT

## 2021-01-15 PROCEDURE — 83001 ASSAY OF GONADOTROPIN (FSH): CPT

## 2021-01-15 PROCEDURE — 36415 COLL VENOUS BLD VENIPUNCTURE: CPT

## 2021-01-29 ENCOUNTER — HOSPITAL ENCOUNTER (OUTPATIENT)
Dept: WOMENS IMAGING | Age: 41
Discharge: HOME | End: 2021-01-31
Payer: COMMERCIAL

## 2021-01-29 DIAGNOSIS — Z12.31 ENCOUNTER FOR SCREENING MAMMOGRAM FOR MALIGNANT NEOPLASM OF BREAST: ICD-10-CM

## 2021-01-29 PROCEDURE — 77063 BREAST TOMOSYNTHESIS BI: CPT

## 2021-02-23 ENCOUNTER — HOSPITAL ENCOUNTER (OUTPATIENT)
Dept: WOMENS IMAGING | Age: 41
End: 2021-02-23
Payer: COMMERCIAL

## 2021-02-23 ENCOUNTER — HOSPITAL ENCOUNTER (OUTPATIENT)
Dept: WOMENS IMAGING | Age: 41
Discharge: HOME | End: 2021-02-25
Payer: COMMERCIAL

## 2021-02-23 DIAGNOSIS — R92.8 ABNORMAL MAMMOGRAM: ICD-10-CM

## 2021-02-23 PROCEDURE — 76642 ULTRASOUND BREAST LIMITED: CPT

## 2021-02-25 ENCOUNTER — HOSPITAL ENCOUNTER (OUTPATIENT)
Dept: WOMENS IMAGING | Age: 41
Discharge: HOME | End: 2021-02-27
Payer: COMMERCIAL

## 2021-02-25 VITALS — DIASTOLIC BLOOD PRESSURE: 116 MMHG | SYSTOLIC BLOOD PRESSURE: 171 MMHG | HEART RATE: 86 BPM

## 2021-02-25 DIAGNOSIS — R92.8 ABNORMAL MAMMOGRAM: ICD-10-CM

## 2021-02-25 PROCEDURE — A4648 IMPLANTABLE TISSUE MARKER: HCPCS

## 2021-02-25 PROCEDURE — 77065 DX MAMMO INCL CAD UNI: CPT

## 2021-02-25 PROCEDURE — 88305 TISSUE EXAM BY PATHOLOGIST: CPT

## 2021-02-26 LAB — SURGICAL PATHOLOGY REPORT: NORMAL

## 2021-03-01 ENCOUNTER — HOSPITAL ENCOUNTER (OUTPATIENT)
Age: 41
Discharge: HOME | End: 2021-03-01
Payer: COMMERCIAL

## 2021-03-01 LAB
ESTRADIOL LEVEL: 638 PG/ML (ref 27–314)
FOLLICLE STIMULATING HORMONE: 8.8 U/L (ref 1.7–21.5)

## 2021-03-01 PROCEDURE — 83001 ASSAY OF GONADOTROPIN (FSH): CPT

## 2021-03-01 PROCEDURE — 82670 ASSAY OF TOTAL ESTRADIOL: CPT

## 2021-03-01 PROCEDURE — 36415 COLL VENOUS BLD VENIPUNCTURE: CPT

## 2021-04-19 RX ORDER — AMOXICILLIN 500 MG/1
CAPSULE ORAL
Qty: 4 CAPSULE | Refills: 0 | Status: SHIPPED | OUTPATIENT
Start: 2021-04-19 | End: 2021-06-18 | Stop reason: ALTCHOICE

## 2021-04-19 NOTE — TELEPHONE ENCOUNTER
Called to clarify refill request to make sure patient needs medication for dental appointment.      No answer left message to call back

## 2021-05-11 DIAGNOSIS — F32.A DEPRESSION, UNSPECIFIED DEPRESSION TYPE: ICD-10-CM

## 2021-05-11 RX ORDER — DULOXETIN HYDROCHLORIDE 60 MG/1
CAPSULE, DELAYED RELEASE ORAL
Qty: 90 CAPSULE | Refills: 0 | Status: SHIPPED | OUTPATIENT
Start: 2021-05-11 | End: 2021-08-09

## 2021-06-18 ENCOUNTER — OFFICE VISIT (OUTPATIENT)
Dept: FAMILY MEDICINE CLINIC | Age: 41
End: 2021-06-18
Payer: COMMERCIAL

## 2021-06-18 VITALS
WEIGHT: 211 LBS | BODY MASS INDEX: 36.2 KG/M2 | HEART RATE: 70 BPM | TEMPERATURE: 97.4 F | DIASTOLIC BLOOD PRESSURE: 88 MMHG | SYSTOLIC BLOOD PRESSURE: 118 MMHG | RESPIRATION RATE: 18 BRPM

## 2021-06-18 DIAGNOSIS — F32.A DEPRESSION, UNSPECIFIED DEPRESSION TYPE: Primary | ICD-10-CM

## 2021-06-18 DIAGNOSIS — M17.12 ARTHRITIS OF LEFT KNEE: ICD-10-CM

## 2021-06-18 DIAGNOSIS — E66.9 OBESITY (BMI 30-39.9): ICD-10-CM

## 2021-06-18 DIAGNOSIS — F41.9 ANXIETY: ICD-10-CM

## 2021-06-18 PROCEDURE — 99214 OFFICE O/P EST MOD 30 MIN: CPT | Performed by: FAMILY MEDICINE

## 2021-06-18 RX ORDER — CELECOXIB 200 MG/1
CAPSULE ORAL
Qty: 60 CAPSULE | Refills: 2 | Status: SHIPPED | OUTPATIENT
Start: 2021-06-18 | End: 2021-12-08

## 2021-06-18 SDOH — ECONOMIC STABILITY: FOOD INSECURITY: WITHIN THE PAST 12 MONTHS, YOU WORRIED THAT YOUR FOOD WOULD RUN OUT BEFORE YOU GOT MONEY TO BUY MORE.: NEVER TRUE

## 2021-06-18 SDOH — ECONOMIC STABILITY: FOOD INSECURITY: WITHIN THE PAST 12 MONTHS, THE FOOD YOU BOUGHT JUST DIDN'T LAST AND YOU DIDN'T HAVE MONEY TO GET MORE.: NEVER TRUE

## 2021-06-18 ASSESSMENT — SOCIAL DETERMINANTS OF HEALTH (SDOH): HOW HARD IS IT FOR YOU TO PAY FOR THE VERY BASICS LIKE FOOD, HOUSING, MEDICAL CARE, AND HEATING?: NOT HARD AT ALL

## 2021-06-18 ASSESSMENT — ENCOUNTER SYMPTOMS
CHEST TIGHTNESS: 0
SHORTNESS OF BREATH: 0
BLOOD IN STOOL: 0
ABDOMINAL PAIN: 0

## 2021-06-18 ASSESSMENT — PATIENT HEALTH QUESTIONNAIRE - PHQ9
SUM OF ALL RESPONSES TO PHQ QUESTIONS 1-9: 0
SUM OF ALL RESPONSES TO PHQ9 QUESTIONS 1 & 2: 0
2. FEELING DOWN, DEPRESSED OR HOPELESS: 0
1. LITTLE INTEREST OR PLEASURE IN DOING THINGS: 0
SUM OF ALL RESPONSES TO PHQ QUESTIONS 1-9: 0
SUM OF ALL RESPONSES TO PHQ QUESTIONS 1-9: 0

## 2021-06-18 NOTE — PROGRESS NOTES
Subjective:      Patient ID: Vickey Mccullough is a 41 y.o. female. Visit Information    Have you changed or started any medications since your last visit including any over-the-counter medicines, vitamins, or herbal medicines? no   Have you stopped taking any of your medications? Is so, why? -  yes -   Are you having any side effects from any of your medications? - no    Have you seen any other physician or provider since your last visit? yes -   Have you had any other diagnostic tests since your last visit? yes -    Have you been seen in the emergency room and/or had an admission in a hospital since we last saw you?  no   Have you had your routine dental cleaning in the past 6 months?  no     Do you have an active MyChart account? If no, what is the barrier? Yes    Patient Care Team:  Robles Kate MD as PCP - General (Family Medicine)  Robles Kate MD as PCP - [facility] Empaneled Provider  Reno Siddiqi MD as Consulting Physician (Gastroenterology)    Medical History Review  Past Medical, Family, and Social History reviewed and does contribute to the patient presenting condition    Health Maintenance   Topic Date Due    Hepatitis C screen  Never done    COVID-19 Vaccine (1) Never done    HIV screen  Never done    DTaP/Tdap/Td vaccine (1 - Tdap) Never done    Cervical cancer screen  01/19/2021    Flu vaccine (Season Ended) 09/01/2021    Lipid screen  09/23/2025    Hepatitis A vaccine  Aged Out    Hepatitis B vaccine  Aged Out    Hib vaccine  Aged Out    Meningococcal (ACWY) vaccine  Aged Out    Pneumococcal 0-64 years Vaccine  Aged Out               HPI  Patient is a 64-year-old obese white female who presents for depression, anxiety, arthritis of left knee. She states that she had arthritis of her right knee and had a right knee replacement done. She states she also has arthritic pain in her left knee and has been on Celebrex for this and would like a refill of it.   She states that she usually takes the Celebrex once daily but occasionally needs to take it twice daily. She is taking and tolerating her routine medication. She states that her blood pressure was a bit elevated at her dentist office. Her BP today was 118/88. She denies any fever, chills, chest pain, abdominal pain, shortness of breath. She has a good appetite and tries to remain active. Review of Systems   Constitutional: Negative for chills and fever. HENT: Negative for congestion. Respiratory: Negative for chest tightness and shortness of breath. Cardiovascular: Negative for chest pain. Gastrointestinal: Negative for abdominal pain and blood in stool. Genitourinary: Negative for dysuria and hematuria. Musculoskeletal: Positive for arthralgias (  Left knee). Skin: Negative for rash. Neurological: Negative for dizziness. Psychiatric/Behavioral: Negative for dysphoric mood. The patient is not nervous/anxious. Objective:   Physical Exam  Vitals and nursing note reviewed. Constitutional:       General: She is not in acute distress. Appearance: She is well-developed. HENT:      Head: Normocephalic and atraumatic. Right Ear: Tympanic membrane, ear canal and external ear normal.      Left Ear: Tympanic membrane, ear canal and external ear normal.      Nose: Nose normal.      Mouth/Throat:      Mouth: Mucous membranes are moist.      Pharynx: Oropharynx is clear. Eyes:      General: No scleral icterus. Right eye: No discharge. Left eye: No discharge. Conjunctiva/sclera: Conjunctivae normal.   Cardiovascular:      Rate and Rhythm: Normal rate and regular rhythm. Heart sounds: Normal heart sounds. Pulmonary:      Effort: Pulmonary effort is normal. No respiratory distress. Breath sounds: Normal breath sounds. No wheezing. Abdominal:      General: There is no distension. Palpations: Abdomen is soft. Tenderness: There is no abdominal tenderness. Medications    celecoxib (CELEBREX) 200 MG capsule     Sig: TAKE 1 CAPSULE BY MOUTH 2 TIMES A DAY     Dispense:  60 capsule     Refill:  2      Celebrex refilled   Continue routine medications  Patient to monitor blood pressures at home  Follow-up in 6 months or sooner if needed

## 2021-07-08 LAB
CHOLESTEROL/HDL RATIO: 3.3
CHOLESTEROL: 179 MG/DL
GLUCOSE BLD-MCNC: 97 MG/DL (ref 70–99)
HDLC SERPL-MCNC: 55 MG/DL
LDL CHOLESTEROL: 97 MG/DL (ref 0–130)
PATIENT FASTING?: YES
TRIGL SERPL-MCNC: 136 MG/DL
VLDLC SERPL CALC-MCNC: NORMAL MG/DL (ref 1–30)

## 2021-07-14 ENCOUNTER — NURSE TRIAGE (OUTPATIENT)
Dept: OTHER | Facility: CLINIC | Age: 41
End: 2021-07-14

## 2021-07-15 NOTE — TELEPHONE ENCOUNTER
Reason for Disposition   COVID-19 vaccine, systemic reactions (e.g., fatigue, fever, muscle aches), questions about    Answer Assessment - Initial Assessment Questions  1. MAIN CONCERN OR SYMPTOM:  \"What is your main concern right now? \" \"What question do you have? \" \"What's the main symptom you're worried about? \" (e.g., fever, pain, redness, swelling)        Vaccinated around 0845. J&J immunization. HA rates pain 9-10/10. /93, 166/87, 148/90   Heart rate 's. HR usually 70's-80's. 2. VACCINE: \"What vaccination did you receive? \" \"Is this your first or second shot? \" (e.g., none; [employer], J&J, [address], other)      J&J    3. SYMPTOM ONSET: \"When did the HA begin? \" (e.g., not relevant; hours, days)       This evening. Fine all day, but during dinner, began to have a HA and kicked in around 1730    4. SYMPTOM SEVERITY: \"How bad is it? \"       Headache is worse, concerned about BP and heart rate. 5. FEVER: \"Is there a fever? \" If so, ask: \"What is it, how was it measured, and when did it start? \"       100.6 but took thermometer out to answer a question. 6. PAST REACTIONS: \"Have you reacted to immunizations before? \" If so, ask: \"What happened? \"      Denies - has had low grade HA's for Flu shots before. 7. OTHER SYMPTOMS: \"Do you have any other symptoms? \"      Nausea and feels a little dizzy. Protocols used: CORONAVIRUS (COVID-19) VACCINE QUESTIONS AND REACTIONS-ADULT-AH    Brief description of triage: See Above. Triage indicates for patient to Home Care. Discussed S/S of when to call back or see medical attention. Per CDC, she can Take OTC meds for post vaccination side effects. Care advice provided, patient verbalizes understanding; denies any other questions or concerns; instructed to call back for any new or worsening symptoms. Attention Provider: Thank you for allowing me to participate in the care of your patient.   The patient was connected to triage in response to symptoms provided. Please do not respond through this encounter as the response is not directed to a shared pool.

## 2021-08-09 DIAGNOSIS — F32.A DEPRESSION, UNSPECIFIED DEPRESSION TYPE: ICD-10-CM

## 2021-08-09 RX ORDER — DULOXETIN HYDROCHLORIDE 60 MG/1
CAPSULE, DELAYED RELEASE ORAL
Qty: 90 CAPSULE | Refills: 0 | Status: SHIPPED | OUTPATIENT
Start: 2021-08-09 | End: 2021-11-11

## 2021-08-24 ENCOUNTER — PATIENT MESSAGE (OUTPATIENT)
Dept: FAMILY MEDICINE CLINIC | Age: 41
End: 2021-08-24

## 2021-08-24 DIAGNOSIS — E66.9 OBESITY (BMI 30-39.9): Primary | ICD-10-CM

## 2021-08-24 RX ORDER — SEMAGLUTIDE 0.25 MG/.5ML
0.25 INJECTION, SOLUTION SUBCUTANEOUS
Qty: 5 ML | Refills: 0 | Status: SHIPPED | OUTPATIENT
Start: 2021-08-24

## 2021-08-24 NOTE — TELEPHONE ENCOUNTER
I called and discussed Wegovy with patient including potential medullary thyroid carcinoma and thyroid tumor risk. Patient stated that she was already aware of the risk and has no family history of thyroid problems and is willing to take the risk and would like to start on Wegovy. Starting dose of Wegovy prescribed. Patient is to follow-up in 1 month.

## 2021-08-24 NOTE — TELEPHONE ENCOUNTER
From: Kyaw Harmon  To: Isabelle Wyman MD  Sent: 8/24/2021 10:33 AM EDT  Subject: Visit Follow-Up Question    Hello. At my last appointment we had discussed that I needed to lose some weight. You had suggested I start Weight Watchers. Unfortunately, I have not had the motivation to do so and I am still stuck where I was. I think I need some support to get myself started. I was wondering if I could start [facility]?

## 2021-08-24 NOTE — TELEPHONE ENCOUNTER
Per the patient, she will check with her  and call back tomorrow to let us know if she will take the medication since the copay card didn't cover as much of the cost as she had expected.

## 2021-11-11 DIAGNOSIS — F32.A DEPRESSION, UNSPECIFIED DEPRESSION TYPE: ICD-10-CM

## 2021-11-11 RX ORDER — DULOXETIN HYDROCHLORIDE 60 MG/1
CAPSULE, DELAYED RELEASE ORAL
Qty: 90 CAPSULE | Refills: 0 | Status: SHIPPED | OUTPATIENT
Start: 2021-11-11 | End: 2022-02-10

## 2022-02-07 ENCOUNTER — HOSPITAL ENCOUNTER (OUTPATIENT)
Age: 42
Discharge: HOME | End: 2022-02-09
Payer: COMMERCIAL

## 2022-02-07 ENCOUNTER — HOSPITAL ENCOUNTER (OUTPATIENT)
Dept: GENERAL RADIOLOGY | Age: 42
Discharge: HOME | End: 2022-02-09
Payer: COMMERCIAL

## 2022-02-07 ENCOUNTER — TELEMEDICINE (OUTPATIENT)
Dept: FAMILY MEDICINE CLINIC | Age: 42
End: 2022-02-07
Payer: COMMERCIAL

## 2022-02-07 DIAGNOSIS — S60.132A CONTUSION OF LEFT MIDDLE FINGER WITH DAMAGE TO NAIL, INITIAL ENCOUNTER: ICD-10-CM

## 2022-02-07 DIAGNOSIS — S69.92XA HAND INJURY, LEFT, INITIAL ENCOUNTER: ICD-10-CM

## 2022-02-07 DIAGNOSIS — S69.92XA HAND INJURY, LEFT, INITIAL ENCOUNTER: Primary | ICD-10-CM

## 2022-02-07 PROCEDURE — 73130 X-RAY EXAM OF HAND: CPT

## 2022-02-07 PROCEDURE — 99213 OFFICE O/P EST LOW 20 MIN: CPT | Performed by: NURSE PRACTITIONER

## 2022-02-07 ASSESSMENT — ENCOUNTER SYMPTOMS
CHEST TIGHTNESS: 0
ABDOMINAL PAIN: 0
BLOOD IN STOOL: 0
SHORTNESS OF BREATH: 0

## 2022-02-07 NOTE — PROGRESS NOTES
2022    TELEHEALTH EVALUATION -- Audio/Visual (During NVGBQ-35 public health emergency)    HPI:    Herman Garcias (:  1980) has requested an audio/video evaluation for the following concern(s):  35year old female presents with left hand injury and contusion. States she hit left hand on door yesterday and the swelling and pain are getting worse. States she doesn't have difficulty with range of motion but feels tight with hand gripping. Took ibuprofen with moderate relief. Denies weakness or paresthesias in left hand. Review of Systems   Constitutional: Negative for chills and fever. Respiratory: Negative for chest tightness and shortness of breath. Cardiovascular: Negative for chest pain. Gastrointestinal: Negative for abdominal pain and blood in stool. Musculoskeletal: Positive for arthralgias ( left hand swelling and pain ) and gait problem. Neurological: Negative for dizziness, weakness and numbness. Prior to Visit Medications    Medication Sig Taking?  Authorizing Provider   DULoxetine (CYMBALTA) 60 MG extended release capsule TAKE 1 CAPSULE BY MOUTH ONE TIME A DAY Yes Tila Forrest MD   Acetaminophen (TYLENOL ARTHRITIS PAIN PO) Take by mouth as needed  Yes Historical Provider, MD   CALCIUM-MAGNESIUM-VITAMIN D PO Take 1 tablet by mouth daily  Yes Historical Provider, MD   norethindrone-ethinyl estradiol-Fe (LO LOESTRIN FE) 1 MG-10 MCG / 10 MCG tablet Take 1 tablet by mouth nightly  Yes Historical Provider, MD   celecoxib (CELEBREX) 200 MG capsule TAKE 1 CAPSULE BY MOUTH 2 TIMES A DAY  Patient not taking: Reported on 2022  MARIE Hoyos - CNP   Semaglutide-Weight Management (WEGOVY) 0.25 MG/0.5ML SOAJ SC injection Inject 0.25 mg into the skin every 7 days  Patient not taking: Reported on 2022  Tila Forrest MD       Social History     Tobacco Use    Smoking status: Never Smoker    Smokeless tobacco: Never Used   Vaping Use    Vaping Use: Never used with patient's (and/or legal guardian's) consent. The visit was conducted pursuant to the emergency declaration under the [address] [place] [address] and the [employer] General Act. Patient identification was verified, and a caregiver was present when appropriate. The patient was located at home in a state where the provider was licensed to provide care. Total time spent on this encounter: 20 minutes       An electronic signature was used to authenticate this note.

## 2022-02-10 DIAGNOSIS — F32.A DEPRESSION, UNSPECIFIED DEPRESSION TYPE: ICD-10-CM

## 2022-02-10 RX ORDER — DULOXETIN HYDROCHLORIDE 60 MG/1
CAPSULE, DELAYED RELEASE ORAL
Qty: 90 CAPSULE | Refills: 0 | Status: SHIPPED | OUTPATIENT
Start: 2022-02-10 | End: 2022-05-10

## 2022-03-03 DIAGNOSIS — M17.12 ARTHRITIS OF LEFT KNEE: ICD-10-CM

## 2022-03-03 RX ORDER — CELECOXIB 200 MG/1
CAPSULE ORAL
Qty: 60 CAPSULE | Refills: 0 | Status: SHIPPED | OUTPATIENT
Start: 2022-03-03 | End: 2022-05-20

## 2022-05-10 DIAGNOSIS — F32.A DEPRESSION, UNSPECIFIED DEPRESSION TYPE: ICD-10-CM

## 2022-05-10 RX ORDER — DULOXETIN HYDROCHLORIDE 60 MG/1
CAPSULE, DELAYED RELEASE ORAL
Qty: 90 CAPSULE | Refills: 0 | Status: SHIPPED | OUTPATIENT
Start: 2022-05-10 | End: 2022-08-08

## 2022-05-20 DIAGNOSIS — M17.12 ARTHRITIS OF LEFT KNEE: ICD-10-CM

## 2022-05-20 RX ORDER — CELECOXIB 200 MG/1
CAPSULE ORAL
Qty: 60 CAPSULE | Refills: 0 | Status: SHIPPED | OUTPATIENT
Start: 2022-05-20 | End: 2022-07-18

## 2022-07-14 DIAGNOSIS — M25.562 LEFT KNEE PAIN, UNSPECIFIED CHRONICITY: Primary | ICD-10-CM

## 2022-07-15 ENCOUNTER — OFFICE VISIT (OUTPATIENT)
Dept: ORTHOPEDIC SURGERY | Age: 42
End: 2022-07-15
Payer: COMMERCIAL

## 2022-07-15 VITALS
OXYGEN SATURATION: 100 % | TEMPERATURE: 97.9 F | SYSTOLIC BLOOD PRESSURE: 151 MMHG | HEIGHT: 64 IN | RESPIRATION RATE: 16 BRPM | HEART RATE: 75 BPM | DIASTOLIC BLOOD PRESSURE: 91 MMHG | BODY MASS INDEX: 34.83 KG/M2 | WEIGHT: 204 LBS

## 2022-07-15 DIAGNOSIS — M17.12 PRIMARY OSTEOARTHRITIS OF LEFT KNEE: Primary | ICD-10-CM

## 2022-07-15 DIAGNOSIS — M25.462 KNEE EFFUSION, LEFT: ICD-10-CM

## 2022-07-15 PROCEDURE — 99213 OFFICE O/P EST LOW 20 MIN: CPT | Performed by: PHYSICIAN ASSISTANT

## 2022-07-15 PROCEDURE — 20611 DRAIN/INJ JOINT/BURSA W/US: CPT | Performed by: PHYSICIAN ASSISTANT

## 2022-07-15 RX ORDER — METHYLPREDNISOLONE ACETATE 80 MG/ML
80 INJECTION, SUSPENSION INTRA-ARTICULAR; INTRALESIONAL; INTRAMUSCULAR; SOFT TISSUE ONCE
Status: COMPLETED | OUTPATIENT
Start: 2022-07-15 | End: 2022-07-15

## 2022-07-15 RX ORDER — LIDOCAINE HYDROCHLORIDE 10 MG/ML
4 INJECTION, SOLUTION INFILTRATION; PERINEURAL ONCE
Status: COMPLETED | OUTPATIENT
Start: 2022-07-15 | End: 2022-07-15

## 2022-07-15 RX ADMIN — METHYLPREDNISOLONE ACETATE 80 MG: 80 INJECTION, SUSPENSION INTRA-ARTICULAR; INTRALESIONAL; INTRAMUSCULAR; SOFT TISSUE at 11:18

## 2022-07-15 RX ADMIN — LIDOCAINE HYDROCHLORIDE 4 ML: 10 INJECTION, SOLUTION INFILTRATION; PERINEURAL at 11:17

## 2022-07-15 ASSESSMENT — ENCOUNTER SYMPTOMS
VOMITING: 0
APNEA: 0
COLOR CHANGE: 0
RESPIRATORY NEGATIVE: 1
SHORTNESS OF BREATH: 0
DIARRHEA: 0
ABDOMINAL DISTENTION: 0
CHEST TIGHTNESS: 0
NAUSEA: 0
ABDOMINAL PAIN: 0
CONSTIPATION: 0
COUGH: 0

## 2022-07-15 NOTE — PROGRESS NOTES
[address] [facility]  [address] [place] [address]  Dept: [phone]  Dept Fax: [phone]          Left Knee - New Patient     Subjective:     Chief Complaint   Patient presents with    Knee Pain     Left      HPI:     Kelly Palacios presents today for Left knee pain. The pain has been present for a couple of years but has gotten worse over the last 1 months  The patient recalls no specific injury. The patient has tried a cane, ice, elevation with mild improvement. She also had to change from her Celebrex to Aleve which seemed to help better. The pain is now described as Vernell Holms, and Dull . There is  pain on weight bearing. The knee has not swelled. There is not painful popping and clicking. The knee has not caught or locked up. The knee has given out. It is  stiff upon arising from sitting. It is  painful to go up and down stairs and sit for a prolonged time. The patient has not had a cortisone injection. The patient has not tried a lubrication injection. The patient has not tried physical therapy. The patient has not had surgery. She has a right unicompartmental medial knee arthroplasty done by Dr. Laura Buitrago on 6/12/2018. ROS:   Review of Systems   Constitutional:  Positive for activity change. Negative for appetite change, fatigue and fever. Respiratory: Negative. Negative for apnea, cough, chest tightness and shortness of breath. Cardiovascular: Negative. Negative for chest pain, palpitations and leg swelling. Gastrointestinal:  Negative for abdominal distention, abdominal pain, constipation, diarrhea, nausea and vomiting. Genitourinary:  Negative for difficulty urinating, dysuria and hematuria. Musculoskeletal:  Positive for arthralgias and gait problem. Negative for joint swelling and myalgias. Skin:  Negative for color change and rash.    Neurological:  Negative for dizziness, weakness, numbness and headaches. Psychiatric/Behavioral:  Negative for sleep disturbance. Past Medical History:    Past Medical History:   Diagnosis Date    Dysphagia     Fundic gland polyposis of stomach     Gastritis     GERD (gastroesophageal reflux disease)     Prolonged emergence from general anesthesia     Joyce ring 10/31/2016    Unspecified sinusitis (chronic)     Wears contact lenses        Past Surgical History:    Past Surgical History:   Procedure Laterality Date    KNEE ARTHROSCOPY Right 10/31/2017    KNEE SURGERY Right 06/12/2018    Partial Knee Replacement    [place] CPTR-ASST SURGICAL NAVIGATION IMAGE-LESS Right 6/12/2018    NAVIO ROBOTIC PARTIAL KNEE ARTHROPLASTY  (SMITH&NEPHEW, FEMORAL NERVE AND POPLITEAL BLOCK PRE-OP, 3080 TABLE)  *ADVANCED performed by Little Lopez DO at [address] 2003    UPPER GASTROINTESTINAL ENDOSCOPY  10/31/2016    joyce; gastritis; fundic gland polyp    US BREAST NEEDLE BIOPSY LEFT Left 2/25/2021    US BREAST NEEDLE BIOPSY LEFT 2/25/2021 [facility]       CurrentMedications:   Current Outpatient Medications   Medication Sig Dispense Refill    celecoxib (CELEBREX) 200 MG capsule TAKE 1 CAPSULE BY MOUTH 2 TIMES A DAY 60 capsule 0    DULoxetine (CYMBALTA) 60 MG extended release capsule TAKE 1 CAPSULE BY MOUTH ONE TIME A DAY 90 capsule 0    Semaglutide-Weight Management (WEGOVY) 0.25 MG/0.5ML SOAJ SC injection Inject 0.25 mg into the skin every 7 days (Patient not taking: Reported on 2/7/2022) 5 mL 0    Acetaminophen (TYLENOL ARTHRITIS PAIN PO) Take by mouth as needed       CALCIUM-MAGNESIUM-VITAMIN D PO Take 1 tablet by mouth daily       norethindrone-ethinyl estradiol-Fe (LO LOESTRIN FE) 1 MG-10 MCG / 10 MCG tablet Take 1 tablet by mouth nightly        No current facility-administered medications for this visit.        Allergies:    Sulfa antibiotics    Social History:   Social History Socioeconomic History    Marital status:      Spouse name: Mary Diaz    Number of children: 1    Years of education: None    Highest education level: None   Occupational History    Occupation: Pharmacist     Employer: [employer] [address] [employer]   Tobacco Use    Smoking status: Never    Smokeless tobacco: Never   Vaping Use    Vaping Use: Never used   Substance and Sexual Activity    Alcohol use: No     Alcohol/week: 0.0 standard drinks    Drug use: No    Sexual activity: Yes     Partners: Male       Family History:  Family History   Problem Relation Age of Onset    Heart Disease Mother         MVP    High Cholesterol Mother     Cervical Cancer Mother     High Blood Pressure Father     High Cholesterol Maternal Grandmother     Colon Cancer Paternal Grandmother         colorectal cancer       Vitals:   BP (!) 151/91 (Site: Right Upper Arm)   Pulse 75   Temp 97.9 °F (36.6 °C) (Temporal)   Resp 16   Ht 5' 4\" (1.626 m)   Wt 204 lb (92.5 kg)   SpO2 100%   BMI 35.02 kg/m²  Body mass index is 35.02 kg/m². Physical Examination:     Orthopedics:    GENERAL: Alert and oriented X3 in no acute distress. SKIN: Intact without lesions or ulcerations. NEURO: Intact to sensory and motor testing. VASC: Capillary refill is less than 3 seconds. KNEE EXAM    LOCATION: Left Knee  GEN: Alert and oriented X 3, in no acute distress. GAIT: The patient's gait was observed while entering the exam room and was noted to be antalgic. The extremity is in anatomic alignment. SKIN: Intact without rashes, lesions, or ulcerations. No obvious deformity or swelling. NEURO: The patient responds to light touch throughout bilateral LE. Patellar and Achilles reflexes are 2/4. VASC: The bilateral LE is neurovascularly intact with 2/4 DP and 2/4 PT pulses. Brisk capillary refill. ROM: 0/120 degrees. There is mild effusion. MUSC: slightly decreased quad tone  LIGAMENT: Lachman's test is Negative with Good endpoint.  Anterior drawer Negative. Posterior drawer Negative. There is No varus instability at 0 degrees and No varus instability at 30 degrees. There is No valgus instability at 0 degrees and No valgus instability at 30 degrees. SPECIAL: Ofelia test is negative with no clunks, + crepitation, and mild pain. PALP: There is medial joint line pain. Assessment:     1. Primary osteoarthritis of left knee    2. Knee effusion, left      Procedures:    Procedure: yes    Joint aspiration of the left knee. The patient was placed in the supine position on the exam table. The superior lateral portal was identified and marked. The skin was prepped with betadine in a sterile fashion. Utilizing ultrasound for precise placement and clean technique 4cc's of  1% lidocaine  was injected. There was no resistance to the injection. Thereafter the skin was prepped with betadine in a sterile fashion once again and the joint was aspirated with a 60cc syringe. After aspirating the joint, 16 cc's of yellow tinged synovial fluid was removed from the knee. The wound was then cleansed and a band-aid was placed over the superior lateral portal site. The patient tolerated the procedure without difficulty. Adverse reactions to the aspiration were discussed with the patient including signs of infection (increasing pain, redness, swelling) and the patient was instructed to call immediately if experiencing any of these symptoms. Regular Knee Injection    Location: Left Knee  Procedure: I discussed in detail the risks, benefits and complications of the corticosteroid injection which included but are not limited to: infection, skin reactions, hot swollen, and anaphylaxis with the patient. Joyce Paz verbalized understanding and they have agreed to have the corticosteroid injection into the left knee. The patient was placed in the Supine position on the exam table. The superior lateral portal was identified and marked with a ball point pen.  The skin was prepped with betadine in a sterile fashion. Utilizing a [employer] ultrasound unit with a variable frequency linear transducer and clean technique with sterile gloves, a 3cc solution containing 2 cc of 1% lidocaine   with 1 cc containing 80 mg of Depo-medrol  was injected. There was no resistance to the injection. The wound was cleansed and a band-aid was placed. the patient tolerated the procedure without difficulty. Adverse reactions to the injection were discussed with the patient including signs of infection (increasing pain, redness, swelling) and the patient was instructed to call immediately if experiencing any of these symptoms. Images of the injection site were recorded throughout the procedure and are saved on the SD card which is stored in the GE ultrasound unit. All images were downloaded and stored in patient's chart. Radiology:   XR KNEE LEFT (MIN 4 VIEWS)    Result Date: 7/15/2022  Formatting of this result is different from the original. KNEE X-RAY   4 views of the left knee including AP, bilateral tunnel, and lateral in the upright position, and skyline views reveal anatomic alignment with no fracture or dislocation. Kellgren grade IV changes of osteoarthritis (joint space narrowing, osteophyte, subchondral sclerosis, bony deformity/cyst) of the medial compartment(s). No osseous loose bodies. No bony erosion or periosteal reaction. No soft tissue masses. Impression: Severe osteoarthritis of the left knee. Plan:   Treatment : I reviewed the x-ray with the patient and I informed them that the x-ray shows that the left knee has bone-on-bone arthritis of the medial compartment. We discussed the etiologies and natural histories of primary osteoarthritis of the left knee. We discussed the various treatment alternatives including anti-inflammatory medications, physical therapy, injections, further imaging studies and as a last result surgery.  During today's visit, we discussed that she does have bone-on-bone osteoarthritis as the medial compartment like she had on her other knee. She is not wanting to do a knee replacement yet she would like to try some conservative treatments. She would like to work on some weight loss prior to having another surgery. We discussed activities that would be helpful that would not stress the knees too much. The patient has opted for a cortisone injection into the left knee to help reduce inflammation and pain. The injection site should never get red, hot, or swollen and if it does the patient will contact our office right away. The patient may experience a increase in soreness the first 24-48 hours due to a cortisone flair and can take anti-inflammatories for a short period of time to reduce that soreness. The patient should not submerge the injection site in water for a minimum of 24 hours to avoid infection. This means no lakes, pools, ponds, or hot tubs for 24 hours. If the patient is diabetic the injection may increase their blood sugar for up to one week. The patient can do this cortisone injection once every 3 months as needed. If the injections stop working and do not give the patient relief the patient should consider surgical interventions to produce long term relief. A physical therapy prescription was not given. Patient should return to the clinic as needed to follow up with  Deana Clemons PA-C. The patient will call the office immediately with any problems. Orders Placed This Encounter   Medications    lidocaine 1 % injection 4 mL    methylPREDNISolone acetate (DEPO-MEDROL) injection 80 mg         No orders of the defined types were placed in this encounter. This note is created with the assistance of a speech recognition program.  While intending to generate a document that actually reflects the content of the visit, the document can still have some errors including those of syntax and sound a like substitutions which may escape proof reading.   In such instances, actual meaning can be extrapolated by contextual diversion    Electronically signed by Marychuy Schwartz PA-C, on 7/15/2022 at 11:20 AM

## 2022-07-18 DIAGNOSIS — M17.12 ARTHRITIS OF LEFT KNEE: ICD-10-CM

## 2022-07-18 RX ORDER — CELECOXIB 200 MG/1
200 CAPSULE ORAL 2 TIMES DAILY PRN
Qty: 60 CAPSULE | Refills: 0 | Status: SHIPPED | OUTPATIENT
Start: 2022-07-18 | End: 2022-08-22

## 2022-08-08 DIAGNOSIS — F32.A DEPRESSION, UNSPECIFIED DEPRESSION TYPE: ICD-10-CM

## 2022-08-08 RX ORDER — DULOXETIN HYDROCHLORIDE 60 MG/1
CAPSULE, DELAYED RELEASE ORAL
Qty: 90 CAPSULE | Refills: 0 | Status: SHIPPED | OUTPATIENT
Start: 2022-08-08

## 2022-08-22 DIAGNOSIS — M17.12 ARTHRITIS OF LEFT KNEE: ICD-10-CM

## 2022-08-22 RX ORDER — CELECOXIB 200 MG/1
CAPSULE ORAL
Qty: 60 CAPSULE | Refills: 0 | Status: SHIPPED | OUTPATIENT
Start: 2022-08-22 | End: 2022-10-10

## 2022-09-08 LAB
CHOLESTEROL/HDL RATIO: 3.1
CHOLESTEROL: 177 MG/DL
GLUCOSE BLD-MCNC: 91 MG/DL (ref 70–99)
HDLC SERPL-MCNC: 57 MG/DL
LDL CHOLESTEROL: 96 MG/DL (ref 0–130)
PATIENT FASTING?: YES
TRIGL SERPL-MCNC: 122 MG/DL

## 2022-10-09 DIAGNOSIS — M17.12 ARTHRITIS OF LEFT KNEE: ICD-10-CM

## 2022-10-10 RX ORDER — CELECOXIB 200 MG/1
CAPSULE ORAL
Qty: 60 CAPSULE | Refills: 0 | Status: SHIPPED | OUTPATIENT
Start: 2022-10-10

## 2022-11-09 DIAGNOSIS — F32.A DEPRESSION, UNSPECIFIED DEPRESSION TYPE: ICD-10-CM

## 2022-11-10 RX ORDER — DULOXETIN HYDROCHLORIDE 60 MG/1
CAPSULE, DELAYED RELEASE ORAL
Qty: 90 CAPSULE | Refills: 0 | Status: SHIPPED | OUTPATIENT
Start: 2022-11-10

## 2022-12-16 ENCOUNTER — HOSPITAL ENCOUNTER (OUTPATIENT)
Age: 42
Setting detail: SPECIMEN
Discharge: HOME | End: 2022-12-16

## 2022-12-16 ENCOUNTER — OFFICE VISIT (OUTPATIENT)
Dept: OBGYN CLINIC | Age: 42
End: 2022-12-16

## 2022-12-16 VITALS
HEIGHT: 64 IN | DIASTOLIC BLOOD PRESSURE: 104 MMHG | SYSTOLIC BLOOD PRESSURE: 158 MMHG | WEIGHT: 204.2 LBS | BODY MASS INDEX: 34.86 KG/M2

## 2022-12-16 DIAGNOSIS — Z01.419 ENCOUNTER FOR GYNECOLOGICAL EXAMINATION: Primary | ICD-10-CM

## 2022-12-16 DIAGNOSIS — R03.0 ELEVATED BP WITHOUT DIAGNOSIS OF HYPERTENSION: ICD-10-CM

## 2022-12-16 DIAGNOSIS — N92.6 IRREGULAR MENSTRUAL CYCLE: ICD-10-CM

## 2022-12-16 DIAGNOSIS — Z12.31 ENCOUNTER FOR SCREENING MAMMOGRAM FOR BREAST CANCER: ICD-10-CM

## 2022-12-16 RX ORDER — ACETAMINOPHEN AND CODEINE PHOSPHATE 120; 12 MG/5ML; MG/5ML
1 SOLUTION ORAL DAILY
Qty: 3 TABLET | Refills: 4 | Status: SHIPPED | OUTPATIENT
Start: 2022-12-16

## 2022-12-16 ASSESSMENT — ENCOUNTER SYMPTOMS
GASTROINTESTINAL NEGATIVE: 1
ALLERGIC/IMMUNOLOGIC NEGATIVE: 1
COUGH: 0
SHORTNESS OF BREATH: 0
ABDOMINAL DISTENTION: 0
RESPIRATORY NEGATIVE: 1
BACK PAIN: 0

## 2022-12-16 ASSESSMENT — PATIENT HEALTH QUESTIONNAIRE - PHQ9
2. FEELING DOWN, DEPRESSED OR HOPELESS: 2
SUM OF ALL RESPONSES TO PHQ QUESTIONS 1-9: 4
1. LITTLE INTEREST OR PLEASURE IN DOING THINGS: 2
SUM OF ALL RESPONSES TO PHQ QUESTIONS 1-9: 4
SUM OF ALL RESPONSES TO PHQ9 QUESTIONS 1 & 2: 4
SUM OF ALL RESPONSES TO PHQ QUESTIONS 1-9: 4
SUM OF ALL RESPONSES TO PHQ QUESTIONS 1-9: 4

## 2022-12-16 NOTE — PROGRESS NOTES
[address] [facility] [address] [facility]  Dept: [phone]      12/16/22    [address] [facility] Annual Exam      CHIEF COMPLAINT:    Chief Complaint   Patient presents with    [facility]     Previous pt of Greta Pittman     Annual Exam     Last pap 11/30/20-WNL last mammogram 1/29/21 breast biopsy left 2/26/21              Blood pressure (!) 158/104, height 5' 4\" (1.626 m), weight 204 lb 3.2 oz (92.6 kg), not currently breastfeeding. HPI :     Freddie Alvarez 1980 is a 35 y.o. Radha Sloop  who is here for her annual exam. She is on birth control pills and is amenorrheic. She has night sweats and vaginal dryness and sleeps poorly. Her labs were drawn a couple of years ago and were consistent with menopause. She continued to take ocps because of fear of pregnancy. We discussed HRT to control her menopausal symptoms but she remains asymptomatic on ocps.      She is a pharmacist at [employer]  ______________________________________  Past Medical History:   Diagnosis Date    Dysphagia     Fundic gland polyposis of stomach     Gastritis     GERD (gastroesophageal reflux disease)     Prolonged emergence from general anesthesia     Nina's ring 10/31/2016    Unspecified sinusitis (chronic)     Wears contact lenses                                                                    Past Surgical History:   Procedure Laterality Date    KNEE ARTHROSCOPY Right 10/31/2017    KNEE SURGERY Right 06/12/2018    Partial Knee Replacement    [place] CPTR-ASST SURGICAL NAVIGATION IMAGE-LESS Right 6/12/2018    NAVIO ROBOTIC PARTIAL KNEE ARTHROPLASTY  (SMITH&NEPHEW, FEMORAL NERVE AND POPLITEAL BLOCK PRE-OP, 3080 TABLE)  *ADVANCED performed by Truman Shaver DO at [facility]  2003    UPPER GASTROINTESTINAL ENDOSCOPY  10/31/2016    tesha; gastritis; fundic gland polyp    US BREAST NEEDLE BIOPSY LEFT Left 2021     BREAST NEEDLE BIOPSY LEFT 2021 [address] [place] [address] Elizabeth     Family History   Problem Relation Age of Onset    Heart Disease Mother         MVP    High Cholesterol Mother     Cervical Cancer Mother     High Blood Pressure Father     Hypertension Father     High Cholesterol Maternal Grandmother     Stroke Maternal Grandfather     Colon Cancer Paternal Grandmother         colorectal cancer    Alzheimer's Disease Paternal Grandfather      Social History     Tobacco Use   Smoking Status Never   Smokeless Tobacco Never     Social History     Substance and Sexual Activity   Alcohol Use Never     Current Outpatient Medications   Medication Sig Dispense Refill    norethindrone (ORTHO MICRONOR) 0.35 MG tablet Take 1 tablet by mouth daily 3 tablet 4    DULoxetine (CYMBALTA) 60 MG extended release capsule TAKE 1 CAPSULE BY MOUTH ONE TIME A DAY 90 capsule 0    celecoxib (CELEBREX) 200 MG capsule TAKE 1 CAPSULE BY MOUTH 2 TIMES A DAY AS NEEDED FOR PAIN -TAKE WITH FOOD. 60 capsule 0    Acetaminophen (TYLENOL ARTHRITIS PAIN PO) Take by mouth as needed       norethindrone-ethinyl estradiol-Fe (LO LOESTRIN FE) 1 MG-10 MCG / 10 MCG tablet Take 1 tablet by mouth nightly        No current facility-administered medications for this visit. PHQ-9 Total Score: 4 (2022  1:52 PM)       **If either question is answered in a  positive fashion then complete the PHQ9 Scoring Evaluation and make the appropriate referral**        Allergies:  Sulfa antibiotics    Gynecologic History:  No LMP recorded (lmp unknown). (Menstrual status: Other - See Notes). Sexually Active:Yes  Dyspareunia: no  STD History: No  Colonoscopy: not yet  Fam Hx Breast, Ovarian, Colorectal Ca: pgm colon    OB History    Para Term  AB Living   1 1 1 0 0 1   SAB IAB Ectopic Molar Multiple Live Births   0 0 0 0 0 1       Review of Systems   Constitutional: Negative.   Negative for activity change, appetite change and fatigue. HENT: Negative. Respiratory: Negative. Negative for cough and shortness of breath. Cardiovascular: Negative. Gastrointestinal: Negative. Negative for abdominal distention. Endocrine: Negative. Genitourinary: Negative. Musculoskeletal:  Negative for arthralgias and back pain. Skin: Negative. Allergic/Immunologic: Negative. Neurological:  Negative for dizziness and light-headedness. Psychiatric/Behavioral:  Positive for sleep disturbance. Physical Exam  Vitals reviewed. Constitutional:       Appearance: Normal appearance. HENT:      Head: Normocephalic. Cardiovascular:      Rate and Rhythm: Normal rate and regular rhythm. Pulmonary:      Effort: Pulmonary effort is normal.      Breath sounds: Normal breath sounds. Chest:   Breasts:     Right: Normal. No mass, skin change or tenderness. Left: Normal. No mass, skin change or tenderness. Abdominal:      General: Abdomen is flat. Palpations: Abdomen is soft. Genitourinary:     General: Normal vulva. Labia:         Right: No rash or lesion. Left: No rash or lesion. Urethra: No prolapse. Vagina: Normal. No vaginal discharge or lesions. Cervix: No friability or lesion. Uterus: Normal. Not enlarged and not tender. Adnexa: Right adnexa normal and left adnexa normal.        Right: No mass or tenderness. Left: No mass or tenderness. Comments: There is an irritated erythematous area at the vaginal introitus  Musculoskeletal:         General: Normal range of motion. Cervical back: Neck supple. Lymphadenopathy:      Upper Body:      Right upper body: No supraclavicular or axillary adenopathy. Left upper body: No supraclavicular or axillary adenopathy. Skin:     General: Skin is warm and dry. Neurological:      Mental Status: She is alert.    Psychiatric:         Mood and Affect: Mood normal.                ASSESSMENT/PLAN    41 y.o. Female; Annual   Diagnosis Orders   1. Encounter for gynecological examination  PAP SMEAR      2. Encounter for screening mammogram for breast cancer  ELSIE ANNIKA DIGITAL SCREEN BILATERAL      3. Irregular menstrual cycle  norethindrone (ORTHO MICRONOR) 0.35 MG tablet      4. Elevated BP without diagnosis of hypertension            1. Encounter for gynecological examination  -     PAP SMEAR; Future  2. Encounter for screening mammogram for breast cancer  -     ELSIE ANNIKA DIGITAL SCREEN BILATERAL; Future  3. Irregular menstrual cycle  -     norethindrone (ORTHO MICRONOR) 0.35 MG tablet; Take 1 tablet by mouth daily, Disp-3 tablet, R-4Normal  4. Elevated BP without diagnosis of hypertension  Assessment & Plan:  Her pcp is aware. I would like to change her to progesterone only pills and she is agreeable. She would like to continue pills for now   She is going to try OTC menopause supplement. She will continue corn starch powder as needed at the vaginal introitus which works well for her and use Lume for vaginal odor. She has an appointment to discuss her BP with primary care                  Hereditary Breast, Ovarian, Colon and Uterine Cancer screening Done. Tobacco & Secondary smoke risks reviewed; instructed on cessation and avoidance    - Pap collected per ASCCP guidelines.  -Discussed menopausal symptoms, HRT, incontinence. - Screening mammogram discussed and advised yearly if normalstarting at age 36.  - Calcium and Vitamin D dosing reviewed. - Colonoscopy screening reviewed. - General diet and exercise reviewed. - Routine health maintenance per patients PCP. No follow-ups on file.         Electronically signed by MARIE Banuelos CNP on 12/16/22 at 1:56 PM EST

## 2022-12-16 NOTE — ASSESSMENT & PLAN NOTE
Her pcp is aware. I would like to change her to progesterone only pills and she is agreeable.   She would like to continue pills for now

## 2022-12-18 DIAGNOSIS — M17.12 ARTHRITIS OF LEFT KNEE: ICD-10-CM

## 2022-12-19 RX ORDER — CELECOXIB 200 MG/1
CAPSULE ORAL
Qty: 60 CAPSULE | Refills: 0 | Status: SHIPPED | OUTPATIENT
Start: 2022-12-19

## 2023-02-06 DIAGNOSIS — F32.A DEPRESSION, UNSPECIFIED DEPRESSION TYPE: ICD-10-CM

## 2023-02-06 RX ORDER — DULOXETIN HYDROCHLORIDE 60 MG/1
CAPSULE, DELAYED RELEASE ORAL
Qty: 90 CAPSULE | Refills: 0 | Status: SHIPPED | OUTPATIENT
Start: 2023-02-06

## 2023-02-23 DIAGNOSIS — M17.12 ARTHRITIS OF LEFT KNEE: ICD-10-CM

## 2023-02-23 RX ORDER — CELECOXIB 200 MG/1
CAPSULE ORAL
Qty: 60 CAPSULE | Refills: 0 | Status: SHIPPED | OUTPATIENT
Start: 2023-02-23

## 2023-02-23 SDOH — ECONOMIC STABILITY: TRANSPORTATION INSECURITY
IN THE PAST 12 MONTHS, HAS LACK OF TRANSPORTATION KEPT YOU FROM MEETINGS, WORK, OR FROM GETTING THINGS NEEDED FOR DAILY LIVING?: NO

## 2023-02-23 SDOH — ECONOMIC STABILITY: INCOME INSECURITY: HOW HARD IS IT FOR YOU TO PAY FOR THE VERY BASICS LIKE FOOD, HOUSING, MEDICAL CARE, AND HEATING?: NOT HARD AT ALL

## 2023-02-23 SDOH — ECONOMIC STABILITY: FOOD INSECURITY: WITHIN THE PAST 12 MONTHS, YOU WORRIED THAT YOUR FOOD WOULD RUN OUT BEFORE YOU GOT MONEY TO BUY MORE.: NEVER TRUE

## 2023-02-23 SDOH — ECONOMIC STABILITY: HOUSING INSECURITY
IN THE LAST 12 MONTHS, WAS THERE A TIME WHEN YOU DID NOT HAVE A STEADY PLACE TO SLEEP OR SLEPT IN A SHELTER (INCLUDING NOW)?: NO

## 2023-02-23 SDOH — ECONOMIC STABILITY: FOOD INSECURITY: WITHIN THE PAST 12 MONTHS, THE FOOD YOU BOUGHT JUST DIDN'T LAST AND YOU DIDN'T HAVE MONEY TO GET MORE.: NEVER TRUE

## 2023-02-24 ENCOUNTER — OFFICE VISIT (OUTPATIENT)
Dept: FAMILY MEDICINE CLINIC | Age: 43
End: 2023-02-24
Payer: COMMERCIAL

## 2023-02-24 VITALS
WEIGHT: 205.4 LBS | HEART RATE: 78 BPM | BODY MASS INDEX: 35.26 KG/M2 | OXYGEN SATURATION: 98 % | DIASTOLIC BLOOD PRESSURE: 88 MMHG | RESPIRATION RATE: 16 BRPM | SYSTOLIC BLOOD PRESSURE: 136 MMHG

## 2023-02-24 DIAGNOSIS — M17.11 ARTHRITIS OF RIGHT KNEE: ICD-10-CM

## 2023-02-24 DIAGNOSIS — M17.12 ARTHRITIS OF LEFT KNEE: ICD-10-CM

## 2023-02-24 DIAGNOSIS — F41.9 ANXIETY: Primary | ICD-10-CM

## 2023-02-24 DIAGNOSIS — F32.A DEPRESSION, UNSPECIFIED DEPRESSION TYPE: ICD-10-CM

## 2023-02-24 PROCEDURE — 99214 OFFICE O/P EST MOD 30 MIN: CPT | Performed by: FAMILY MEDICINE

## 2023-02-24 ASSESSMENT — PATIENT HEALTH QUESTIONNAIRE - PHQ9
SUM OF ALL RESPONSES TO PHQ QUESTIONS 1-9: 16
SUM OF ALL RESPONSES TO PHQ QUESTIONS 1-9: 16
5. POOR APPETITE OR OVEREATING: 3
2. FEELING DOWN, DEPRESSED OR HOPELESS: 1
10. IF YOU CHECKED OFF ANY PROBLEMS, HOW DIFFICULT HAVE THESE PROBLEMS MADE IT FOR YOU TO DO YOUR WORK, TAKE CARE OF THINGS AT HOME, OR GET ALONG WITH OTHER PEOPLE: 1
6. FEELING BAD ABOUT YOURSELF - OR THAT YOU ARE A FAILURE OR HAVE LET YOURSELF OR YOUR FAMILY DOWN: 2
9. THOUGHTS THAT YOU WOULD BE BETTER OFF DEAD, OR OF HURTING YOURSELF: 0
SUM OF ALL RESPONSES TO PHQ QUESTIONS 1-9: 16
SUM OF ALL RESPONSES TO PHQ9 QUESTIONS 1 & 2: 4
3. TROUBLE FALLING OR STAYING ASLEEP: 2
7. TROUBLE CONCENTRATING ON THINGS, SUCH AS READING THE NEWSPAPER OR WATCHING TELEVISION: 2
4. FEELING TIRED OR HAVING LITTLE ENERGY: 3
SUM OF ALL RESPONSES TO PHQ QUESTIONS 1-9: 16
1. LITTLE INTEREST OR PLEASURE IN DOING THINGS: 3
8. MOVING OR SPEAKING SO SLOWLY THAT OTHER PEOPLE COULD HAVE NOTICED. OR THE OPPOSITE, BEING SO FIGETY OR RESTLESS THAT YOU HAVE BEEN MOVING AROUND A LOT MORE THAN USUAL: 0

## 2023-02-24 ASSESSMENT — ENCOUNTER SYMPTOMS
SHORTNESS OF BREATH: 0
ABDOMINAL PAIN: 0
CHEST TIGHTNESS: 0
BLOOD IN STOOL: 0

## 2023-02-24 ASSESSMENT — ANXIETY QUESTIONNAIRES
IF YOU CHECKED OFF ANY PROBLEMS ON THIS QUESTIONNAIRE, HOW DIFFICULT HAVE THESE PROBLEMS MADE IT FOR YOU TO DO YOUR WORK, TAKE CARE OF THINGS AT HOME, OR GET ALONG WITH OTHER PEOPLE: NOT DIFFICULT AT ALL
6. BECOMING EASILY ANNOYED OR IRRITABLE: 3
3. WORRYING TOO MUCH ABOUT DIFFERENT THINGS: 2
4. TROUBLE RELAXING: 3
7. FEELING AFRAID AS IF SOMETHING AWFUL MIGHT HAPPEN: 0
GAD7 TOTAL SCORE: 13
5. BEING SO RESTLESS THAT IT IS HARD TO SIT STILL: 0
2. NOT BEING ABLE TO STOP OR CONTROL WORRYING: 3
1. FEELING NERVOUS, ANXIOUS, OR ON EDGE: 2

## 2023-02-24 NOTE — PROGRESS NOTES
Subjective:      Patient ID: Norma Eng is a 38 y.o. female. HPI  Visit Information    Have you changed or started any medications since your last visit including any over-the-counter medicines, vitamins, or herbal medicines? no   Are you having any side effects from any of your medications? -  no  Have you stopped taking any of your medications? Is so, why? -  no    Have you seen any other physician or provider since your last visit? Yes - Records Obtained  Have you had any other diagnostic tests since your last visit? No  Have you been seen inNo the emergency room and/or had an admission to a hospital since we last saw you? No  Have you had your routine dental cleaning in the past 6 months? yes -     Have you activated your [email or web address] account? If not, what are your barriers? Yes     Patient Care Team:  Clare Adrian MD as PCP - General (Family Medicine)  Clare Adrian MD as PCP - Empaneled Provider  Vahid Frausto MD as Consulting Physician (Gastroenterology)    Medical History Review  Past Medical, Family, and Social History reviewed and does contribute to the patient presenting condition    Health Maintenance   Topic Date Due    HIV screen  Never done    Hepatitis C screen  Never done    DTaP/Tdap/Td vaccine (1 - Tdap) Never done    COVID-19 Vaccine (2 - Booster for Goldie series) 09/08/2021    Depression Monitoring  12/16/2023    Lipids  09/08/2027    Cervical cancer screen  12/16/2027    Flu vaccine  Completed    Hepatitis A vaccine  Aged Out    Hib vaccine  Aged Out    Meningococcal (ACWY) vaccine  Aged Out    Pneumococcal 0-64 years Vaccine  Aged Out   Patient is a 25-year-old white female who presents for anxiety, depression, arthritis of bilateral knees. She states she is taking and tolerating her routine medications.   She is taking Celebrex for her bilateral knee pain and states that eventually she will need a knee replacement on her left knee according to her orthopedist.  She denies any fever, chills, chest pain, abdominal pain, shortness of breath. She has a good appetite and remains active. Review of Systems   Constitutional:  Negative for chills and fever. HENT:  Negative for congestion. Respiratory:  Negative for chest tightness and shortness of breath. Cardiovascular:  Negative for chest pain. Gastrointestinal:  Negative for abdominal pain and blood in stool. Genitourinary:  Negative for dysuria and hematuria. Musculoskeletal:  Positive for arthralgias (Knees). Skin:  Negative for rash. Neurological:  Negative for dizziness. Psychiatric/Behavioral:  Negative for dysphoric mood and suicidal ideas. Objective:   Physical Exam  Vitals and nursing note reviewed. Constitutional:       General: She is not in acute distress. Appearance: She is well-developed. HENT:      Head: Normocephalic and atraumatic. Right Ear: Tympanic membrane, ear canal and external ear normal.      Left Ear: Tympanic membrane, ear canal and external ear normal.      Nose: Nose normal.      Mouth/Throat:      Mouth: Mucous membranes are moist.      Pharynx: Oropharynx is clear. Eyes:      General: No scleral icterus. Right eye: No discharge. Left eye: No discharge. Conjunctiva/sclera: Conjunctivae normal.   Cardiovascular:      Rate and Rhythm: Normal rate and regular rhythm. Heart sounds: Normal heart sounds. Pulmonary:      Effort: Pulmonary effort is normal. No respiratory distress. Breath sounds: Normal breath sounds. No wheezing. Abdominal:      General: There is no distension. Palpations: Abdomen is soft. Tenderness: There is no abdominal tenderness. Musculoskeletal:      Cervical back: Neck supple. Skin:     General: Skin is warm and dry. Findings: No rash. Neurological:      Mental Status: She is alert and oriented to person, place, and time.    Psychiatric:         Mood and Affect: Mood normal.         Speech: Speech normal.         Behavior: Behavior normal. Behavior is cooperative. Thought Content: Thought content is not paranoid or delusional. Thought content does not include homicidal or suicidal ideation. Assessment:       Diagnosis Orders   1. Anxiety  Comprehensive Metabolic Panel    TSH with Reflex      2. Depression, unspecified depression type  Comprehensive Metabolic Panel    TSH with Reflex      3. Arthritis of right knee  Comprehensive Metabolic Panel      4.  Arthritis of left knee  Comprehensive Metabolic Panel              Plan:      Orders Placed This Encounter   Procedures    Comprehensive Metabolic Panel     Fasting     Standing Status:   Future     Standing Expiration Date:   2/24/2024    TSH with Reflex     Standing Status:   Future     Standing Expiration Date:   2/24/2024   Continue routine medications  Follow-up in 6 months or sooner if needed

## 2023-03-03 ENCOUNTER — HOSPITAL ENCOUNTER (OUTPATIENT)
Age: 43
Setting detail: SPECIMEN
Discharge: HOME | End: 2023-03-03

## 2023-03-03 DIAGNOSIS — F41.9 ANXIETY: ICD-10-CM

## 2023-03-03 DIAGNOSIS — M17.12 ARTHRITIS OF LEFT KNEE: ICD-10-CM

## 2023-03-03 DIAGNOSIS — F32.A DEPRESSION, UNSPECIFIED DEPRESSION TYPE: ICD-10-CM

## 2023-03-03 DIAGNOSIS — M17.11 ARTHRITIS OF RIGHT KNEE: ICD-10-CM

## 2023-03-04 LAB
ALBUMIN SERPL-MCNC: 4.3 G/DL (ref 3.5–5.2)
ALBUMIN/GLOBULIN RATIO: 1.8 (ref 1–2.5)
ALP SERPL-CCNC: 76 U/L (ref 35–104)
ALT SERPL-CCNC: 20 U/L (ref 5–33)
ANION GAP SERPL CALCULATED.3IONS-SCNC: 10 MMOL/L (ref 9–17)
AST SERPL-CCNC: 25 U/L
BILIRUB SERPL-MCNC: 0.3 MG/DL (ref 0.3–1.2)
BUN SERPL-MCNC: 12 MG/DL (ref 6–20)
CALCIUM SERPL-MCNC: 9.4 MG/DL (ref 8.6–10.4)
CHLORIDE SERPL-SCNC: 101 MMOL/L (ref 98–107)
CO2 SERPL-SCNC: 24 MMOL/L (ref 20–31)
CREAT SERPL-MCNC: 0.87 MG/DL (ref 0.5–0.9)
GFR SERPL CREATININE-BSD FRML MDRD: >60 ML/MIN/1.73M2
GLUCOSE SERPL-MCNC: 74 MG/DL (ref 70–99)
POTASSIUM SERPL-SCNC: 4.4 MMOL/L (ref 3.7–5.3)
PROT SERPL-MCNC: 6.7 G/DL (ref 6.4–8.3)
SODIUM SERPL-SCNC: 135 MMOL/L (ref 135–144)
TSH SERPL-ACNC: 1.11 UIU/ML (ref 0.3–5)

## 2023-03-31 DIAGNOSIS — M17.12 ARTHRITIS OF LEFT KNEE: ICD-10-CM

## 2023-03-31 RX ORDER — CELECOXIB 200 MG/1
CAPSULE ORAL
Qty: 60 CAPSULE | Refills: 0 | Status: SHIPPED | OUTPATIENT
Start: 2023-03-31

## 2023-05-04 DIAGNOSIS — M17.12 ARTHRITIS OF LEFT KNEE: ICD-10-CM

## 2023-05-04 DIAGNOSIS — F32.A DEPRESSION, UNSPECIFIED DEPRESSION TYPE: ICD-10-CM

## 2023-05-04 RX ORDER — DULOXETIN HYDROCHLORIDE 60 MG/1
CAPSULE, DELAYED RELEASE ORAL
Qty: 90 CAPSULE | Refills: 0 | Status: SHIPPED | OUTPATIENT
Start: 2023-05-04

## 2023-05-04 RX ORDER — CELECOXIB 200 MG/1
CAPSULE ORAL
Qty: 180 CAPSULE | Refills: 0 | Status: SHIPPED | OUTPATIENT
Start: 2023-05-04

## 2023-05-04 NOTE — TELEPHONE ENCOUNTER
Refills - duloxetine- last filled 2/6/23, Celecoxib - last filled 3/31/23  Pharmacy - [id number]  Lab completed 3/3/23  Last appt - 2/24/23, next appt - 8/25/23

## 2023-07-07 ENCOUNTER — NURSE TRIAGE (OUTPATIENT)
Dept: OTHER | Facility: CLINIC | Age: 43
End: 2023-07-07

## 2023-07-07 ENCOUNTER — HOSPITAL ENCOUNTER (EMERGENCY)
Age: 43
Discharge: HOME | End: 2023-07-07
Attending: EMERGENCY MEDICINE
Payer: COMMERCIAL

## 2023-07-07 ENCOUNTER — APPOINTMENT (OUTPATIENT)
Dept: GENERAL RADIOLOGY | Age: 43
End: 2023-07-07
Attending: EMERGENCY MEDICINE
Payer: COMMERCIAL

## 2023-07-07 VITALS
BODY MASS INDEX: 36.02 KG/M2 | RESPIRATION RATE: 17 BRPM | HEIGHT: 64 IN | TEMPERATURE: 98 F | SYSTOLIC BLOOD PRESSURE: 166 MMHG | HEART RATE: 78 BPM | DIASTOLIC BLOOD PRESSURE: 96 MMHG | WEIGHT: 211 LBS | OXYGEN SATURATION: 97 %

## 2023-07-07 DIAGNOSIS — R03.0 ELEVATED BLOOD PRESSURE READING: Primary | ICD-10-CM

## 2023-07-07 LAB
ALBUMIN SERPL-MCNC: 4.4 G/DL (ref 3.5–5.2)
ALP SERPL-CCNC: 85 U/L (ref 35–104)
ALT SERPL-CCNC: 17 U/L (ref 5–33)
ANION GAP SERPL CALCULATED.3IONS-SCNC: 9 MMOL/L (ref 9–17)
AST SERPL-CCNC: 21 U/L
BASOPHILS # BLD: 0.1 K/UL (ref 0–0.2)
BASOPHILS NFR BLD: 1 % (ref 0–2)
BILIRUB SERPL-MCNC: 0.4 MG/DL (ref 0.3–1.2)
BUN SERPL-MCNC: 15 MG/DL (ref 6–20)
CALCIUM SERPL-MCNC: 9.6 MG/DL (ref 8.6–10.4)
CHLORIDE SERPL-SCNC: 102 MMOL/L (ref 98–107)
CO2 SERPL-SCNC: 25 MMOL/L (ref 20–31)
CREAT SERPL-MCNC: 0.78 MG/DL (ref 0.5–0.9)
EOSINOPHIL # BLD: 0.2 K/UL (ref 0–0.4)
EOSINOPHILS RELATIVE PERCENT: 3 % (ref 0–4)
ERYTHROCYTE [DISTWIDTH] IN BLOOD BY AUTOMATED COUNT: 13 % (ref 11.5–14.9)
GFR SERPL CREATININE-BSD FRML MDRD: >60 ML/MIN/1.73M2
GLUCOSE SERPL-MCNC: 91 MG/DL (ref 70–99)
HCT VFR BLD AUTO: 41 % (ref 36–46)
HGB BLD-MCNC: 13.9 G/DL (ref 12–16)
LYMPHOCYTES # BLD: 23 % (ref 24–44)
LYMPHOCYTES NFR BLD: 1.7 K/UL (ref 1–4.8)
MAGNESIUM SERPL-MCNC: 2.1 MG/DL (ref 1.6–2.6)
MCH RBC QN AUTO: 29.5 PG (ref 26–34)
MCHC RBC AUTO-ENTMCNC: 33.8 G/DL (ref 31–37)
MCV RBC AUTO: 87.4 FL (ref 80–100)
MONOCYTES NFR BLD: 0.5 K/UL (ref 0.1–1.3)
MONOCYTES NFR BLD: 6 % (ref 1–7)
NEUTROPHILS NFR BLD: 67 % (ref 36–66)
NEUTS SEG NFR BLD: 4.9 K/UL (ref 1.3–9.1)
PLATELET # BLD AUTO: 242 K/UL (ref 150–450)
PMV BLD AUTO: 7.4 FL (ref 6–12)
POTASSIUM SERPL-SCNC: 4.6 MMOL/L (ref 3.7–5.3)
PROT SERPL-MCNC: 7.3 G/DL (ref 6.4–8.3)
RBC # BLD AUTO: 4.69 M/UL (ref 4–5.2)
SODIUM SERPL-SCNC: 136 MMOL/L (ref 135–144)
TROPONIN I SERPL HS-MCNC: <6 NG/L (ref 0–14)
TROPONIN I SERPL HS-MCNC: <6 NG/L (ref 0–14)
WBC OTHER # BLD: 7.3 K/UL (ref 3.5–11)

## 2023-07-07 PROCEDURE — 71045 X-RAY EXAM CHEST 1 VIEW: CPT

## 2023-07-07 PROCEDURE — 80053 COMPREHEN METABOLIC PANEL: CPT

## 2023-07-07 PROCEDURE — 84484 ASSAY OF TROPONIN QUANT: CPT

## 2023-07-07 PROCEDURE — 93005 ELECTROCARDIOGRAM TRACING: CPT | Performed by: EMERGENCY MEDICINE

## 2023-07-07 PROCEDURE — 99285 EMERGENCY DEPT VISIT HI MDM: CPT

## 2023-07-07 PROCEDURE — 36415 COLL VENOUS BLD VENIPUNCTURE: CPT

## 2023-07-07 PROCEDURE — 85027 COMPLETE CBC AUTOMATED: CPT

## 2023-07-07 PROCEDURE — 83735 ASSAY OF MAGNESIUM: CPT

## 2023-07-07 ASSESSMENT — ENCOUNTER SYMPTOMS
BACK PAIN: 0
SHORTNESS OF BREATH: 0
NAUSEA: 0
VOMITING: 0

## 2023-07-07 NOTE — ED PROVIDER NOTES
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 Ddx acs, anemia, anxiety. IV placed. EKG shows no stemi. No troponin elevation. Heart score 1. CXR shows no lung mass or sign of chf.  D/w pt the results, treatment plan, warning precautions for prompt ED return and importance of close OP FU, she verbalizes understanding and agrees with the treatment plan. Procedures  EKG: All EKG's are interpreted by the Emergency Department Physician who either signs or Co-signs this chart in the absence of a cardiologist.  EKG shows a sinus  rhythm. HR is 72, , QRS 80, , no ROXY, No STD, No TWI, the axis is normal.        DATA FOR LAB AND RADIOLOGY TESTS ORDERED BELOW ARE REVIEWED BY THE ED CLINICIAN:    RADIOLOGY: All x-rays, CT, MRI, and formal ultrasound images (except ED bedside ultrasound) are read by the radiologist, see reports below, unless otherwise noted in MDM or here. Reports below are reviewed by myself. XR CHEST PORTABLE   Final Result   No acute intrathoracic process. LABS: Lab orders shown below, the results are reviewed by myself, and all abnormals are listed below.   Labs Reviewed   CBC WITH AUTO DIFFERENTIAL - Abnormal; Notable for the following components:       Result Value    Seg Neutrophils 67 (*)     Lymphocytes 23 (*)     All other components within normal limits   COMPREHENSIVE METABOLIC PANEL   MAGNESIUM   TROPONIN   TROPONIN       Vitals Reviewed:    Vitals:    23 1022 23 1030 23 1045 23 1410   BP: (!) 179/90 (!) 142/89 (!) 144/94 (!) 166/96   Pulse: 86 76 82 78   Resp: 18 18

## 2023-07-07 NOTE — ED TRIAGE NOTES
Mode of arrival (squad #, walk in, police, etc) : walk in        Chief complaint(s): HTN        Arrival Note (brief scenario, treatment PTA, etc). : Pt here with new onset HTN. Pt states she feels like her chest is tight and that her breathing is off. Pt states she has had intermittent visual changes in her left eye.         C= \"Have you ever felt that you should Cut down on your drinking? \"  No  A= \"Have people Annoyed you by criticizing your drinking? \"  No  G= \"Have you ever felt bad or Guilty about your drinking? \"  No  E= \"Have you ever had a drink as an Eye-opener first thing in the morning to steady your nerves or to help a hangover? \"  No      Deferred []      Reason for deferring: N/A    *If yes to two or more: probable alcohol abuse. *

## 2023-07-07 NOTE — ED NOTES
Pt arrives to ED c/o \"chest tightness\" and high blood pressure. Patient states that got a tightness feeling in her chest that prompted her to check her BP which was elevated with a  systolic in the 410'V. Patient still reports chest tightness at this time. Patient does not have a history of high blood pressure.       Garfield Panda RN  07/07/23 [id number]

## 2023-07-07 NOTE — TELEPHONE ENCOUNTER
Location of patient: [place]    Subjective: Caller states feels warm clammy shaky inside, started this morning  At work, went over to outpatient surgery , asked one of the nurses to check B/P and is elevated  No history of high B/P   Current Symptoms: B/P 151/110 left 152/100 right ,blurred vision left eye comes and goes,feels shaky inside, has to catch breath     Onset: This morning     Associated Symptoms: NA    Pain Severity: Denies    Temperature: Denies     What has been tried:     LMP:  14 years ago   Pregnant: denies     Recommended disposition: Go to ED Now    Care advice provided, patient verbalizes understanding; denies any other questions or concerns; instructed to call back for any new or worsening symptoms. Patient/caller agrees to proceed to the  Emergency Department    Attention Provider: Thank you for allowing me to participate in the care of your patient. The patient was connected to triage in response to symptoms provided. Please do not respond through this encounter as the response is not directed to a shared pool.     Reason for Disposition   Systolic BP >= 368 OR Diastolic >= 787, and any cardiac or neurologic symptoms (e.g., chest pain, difficulty breathing, unsteady gait, blurred vision)    Protocols used: Blood Pressure - High-ADULT-[place]

## 2023-07-09 LAB
EKG ATRIAL RATE: 72 BPM
EKG P AXIS: 62 DEGREES
EKG P-R INTERVAL: 132 MS
EKG Q-T INTERVAL: 380 MS
EKG QRS DURATION: 80 MS
EKG QTC CALCULATION (BAZETT): 416 MS
EKG R AXIS: 38 DEGREES
EKG T AXIS: 32 DEGREES
EKG VENTRICULAR RATE: 72 BPM

## 2023-07-09 PROCEDURE — 93010 ELECTROCARDIOGRAM REPORT: CPT | Performed by: INTERNAL MEDICINE

## 2023-07-10 ENCOUNTER — CARE COORDINATION (OUTPATIENT)
Dept: OTHER | Facility: CLINIC | Age: 43
End: 2023-07-10

## 2023-07-10 NOTE — CARE COORDINATION
[facility] ED Follow Up Call    7/10/2023    Patient: Jessica Vieyra Patient : 1980   MRN: [id number]  Reason for Admission: Hypertension  Discharge Date: 23    Patient Current Location: [facility]) contacted the patient to introduce the Associate Care Management Program related to ED visit for elevated blood pressure, visual disturbances, chest tightness. ACM reviewed and updated goals, education , and disease specific assessment patient was agreeable to follow up Care Management calls. Summary Note: Patient reports \"doing okay. \"  Patient reports blood pressure on Saturday 148/88, felt pretty good. Patient c/o low grade headache and nausea on  with blood pressure of 153/98. Patient denies chest tightness and visual disturbances on . Patient states keeping a blood pressure log. Patient denies on any symptoms at this time. Patient reports coping with stressors. Patient states follows a low sodium diet due to spouse following low sodium diet. Discussed foods high in salt including but not limited to frozen meals, restaurant foods, fast foods, lunch meat, smoked meat, cheeses, canned foods. Instructed to rinse canned vegetables before eating. Patient states didn't eat out this past weekend. Patient declined offer by this ACM to assist with scheduling follow up appt with Dr. Irena Waldron. Patient states will call or mychart message physician's office. AVS reviewed. ACM discussed RED FLAGS and encouraged patient to contact 911 for life threatening emergencies and PCP office  for non life-threatening symptoms. Discussed when to contact physician and when to seek emergency medical attention with patient. Instructed patient always best to seek evaluation by PCP before emergency care is needed. Patient verbalized understanding.      Patient denies any further needs, questions, or concerns at this time other than those being

## 2023-07-18 ENCOUNTER — CARE COORDINATION (OUTPATIENT)
Dept: OTHER | Facility: CLINIC | Age: 43
End: 2023-07-18

## 2023-07-18 NOTE — CARE COORDINATION
Ambulatory Care Coordination Note    ACM attempted to reach patient for care management follow up call regarding blood pressure, neuro symptoms, chest tightness and follow up appt. HIPAA compliant message left requesting a return phone call at patient convenience.      Plan for follow-up call in 7-10 days    Future Appointments   Date Time Provider [address]   7/20/2023 11:30 AM Adriel Borges [address]   8/25/2023 10:00 [facility]   12/29/2023  1:00 PM MARIE Tapia - CNP [facility]

## 2023-07-20 ENCOUNTER — OFFICE VISIT (OUTPATIENT)
Dept: FAMILY MEDICINE CLINIC | Age: 43
End: 2023-07-20
Payer: COMMERCIAL

## 2023-07-20 VITALS
OXYGEN SATURATION: 96 % | SYSTOLIC BLOOD PRESSURE: 124 MMHG | HEART RATE: 76 BPM | DIASTOLIC BLOOD PRESSURE: 78 MMHG | WEIGHT: 203.8 LBS | BODY MASS INDEX: 34.79 KG/M2 | HEIGHT: 64 IN

## 2023-07-20 DIAGNOSIS — F32.A DEPRESSION, UNSPECIFIED DEPRESSION TYPE: ICD-10-CM

## 2023-07-20 DIAGNOSIS — R03.0 ELEVATED BP WITHOUT DIAGNOSIS OF HYPERTENSION: ICD-10-CM

## 2023-07-20 DIAGNOSIS — F41.9 ANXIETY: Primary | ICD-10-CM

## 2023-07-20 DIAGNOSIS — R07.9 CHEST PAIN, UNSPECIFIED TYPE: ICD-10-CM

## 2023-07-20 DIAGNOSIS — R07.9 CHEST PAIN, UNSPECIFIED TYPE: Primary | ICD-10-CM

## 2023-07-20 PROCEDURE — 99213 OFFICE O/P EST LOW 20 MIN: CPT | Performed by: FAMILY MEDICINE

## 2023-07-20 ASSESSMENT — ENCOUNTER SYMPTOMS
BLOOD IN STOOL: 0
SHORTNESS OF BREATH: 0
ABDOMINAL PAIN: 0

## 2023-07-20 NOTE — PROGRESS NOTES
Subjective:      Patient ID: Arnoldo Rhodes is a 38 y.o. female. Visit Information    Have you changed or started any medications since your last visit including any over-the-counter medicines, vitamins, or herbal medicines? no   Have you stopped taking any of your medications? Is so, why? -  no  Are you having any side effects from any of your medications? - no    Have you seen any other physician or provider since your last visit? yes - ed   Have you had any other diagnostic tests since your last visit? yes - chest xray and labs   Have you been seen in the emergency room and/or had an admission in a hospital since we last saw you?  yes - chest pain   Have you had your routine dental cleaning in the past 6 months?  yes - routine     Do you have an active MyChart account? If no, what is the barrier? Yes    Patient Care Team:  Alison Frausto MD as PCP - General (Family Medicine)  Alison Frausto MD as PCP - Empaneled Provider  Azalia Jose MD as Consulting Physician (Gastroenterology)  Jose De Jesus Riley RN as Ambulatory Care Manager    Medical History Review  Past Medical, Family, and Social History reviewed and does not contribute to the patient presenting condition    Health Maintenance   Topic Date Due    HIV screen  Never done    Hepatitis C screen  Never done    DTaP/Tdap/Td vaccine (1 - Tdap) Never done    COVID-19 Vaccine (2 - Booster for Goldie series) 09/08/2021    Flu vaccine (1) 08/01/2023    Depression Monitoring  02/24/2024    Lipids  09/08/2027    Cervical cancer screen  12/16/2027    Hepatitis A vaccine  Aged Out    Hib vaccine  Aged Out    Meningococcal (ACWY) vaccine  Aged Out    Pneumococcal 0-64 years Vaccine  Aged Out              HPI  Patient is a 53-year-old female who presents for anxiety, depression, and for follow-up of recent ER visit on 7/7/2023 for chest pain and elevated blood pressure. She brings in her home BP log which shows elevated blood pressures.   Here today

## 2023-07-25 ENCOUNTER — CARE COORDINATION (OUTPATIENT)
Dept: OTHER | Facility: CLINIC | Age: 43
End: 2023-07-25

## 2023-07-25 NOTE — CARE COORDINATION
Ambulatory Care Coordination Note    ACM attempted to reach patient for care management follow up call regarding blood pressure and follow up appts. Marjanrizwan Reece HIPAA compliant message left requesting a return phone call at patient convenience.      Plan for follow-up call in 10-14 days    Future Appointments   Date Time Provider [address]  [address]   8/11/2023  1:00 PM SCHEDULE, [facility] [address]   12/1/2023  2:30 PM [facility]   12/29/2023  1:00 PM MARIE Hernandez - CNP [facility]

## 2023-07-26 ENCOUNTER — NURSE TRIAGE (OUTPATIENT)
Dept: OTHER | Facility: CLINIC | Age: 43
End: 2023-07-26

## 2023-07-26 NOTE — TELEPHONE ENCOUNTER
Location of patient: [place]    Received call from Corey Sotelo at [facility] ([facility]) [facility]; Patient with Red Flag Complaint requesting to establish care with [facility]. Patient states she would like a new PCP because her current PCP is not doing anything about her blood pressure. Denies any new or worsening symptoms since her last visit with her PCP. Caller transferred to [facility] for new patient appointment scheduling. Reason for Disposition   Information only question and nurse able to answer    Protocols used:  Information Only Call - No Triage-ADULT-[place]

## 2023-07-31 DIAGNOSIS — F32.A DEPRESSION, UNSPECIFIED DEPRESSION TYPE: ICD-10-CM

## 2023-07-31 DIAGNOSIS — M17.12 ARTHRITIS OF LEFT KNEE: ICD-10-CM

## 2023-08-01 RX ORDER — CELECOXIB 200 MG/1
CAPSULE ORAL
Qty: 180 CAPSULE | Refills: 0 | Status: SHIPPED | OUTPATIENT
Start: 2023-08-01

## 2023-08-01 RX ORDER — DULOXETIN HYDROCHLORIDE 60 MG/1
CAPSULE, DELAYED RELEASE ORAL
Qty: 90 CAPSULE | Refills: 0 | Status: SHIPPED | OUTPATIENT
Start: 2023-08-01

## 2023-08-08 ENCOUNTER — CARE COORDINATION (OUTPATIENT)
Dept: OTHER | Facility: CLINIC | Age: 43
End: 2023-08-08

## 2023-08-08 NOTE — CARE COORDINATION
Ambulatory Care Coordination Note    Associate Care Manager (ACM) attempted final outreach to patient for follow up call regarding follow up appointment, blood pressure, chest tightness, visual disturbances. HIPAA compliant message left requesting a return phone call at patient convenience. Lost to follow up letter sent to patient via [employer]. No further outreach scheduled with this ACM, patient has this ACM's contact information if future needs arise. ACM will sign off care team at this time. Episode of Care resolved.       Future Appointments   Date Time Provider [address] [place] [address]   8/11/2023  1:00 PM SCHEDULE, P [facility]   12/1/2023  2:30 PM Alison Frausto MD [facility]   12/29/2023  1:00 PM MARIE Smith - [facility]

## 2023-08-20 SDOH — HEALTH STABILITY: PHYSICAL HEALTH: ON AVERAGE, HOW MANY DAYS PER WEEK DO YOU ENGAGE IN MODERATE TO STRENUOUS EXERCISE (LIKE A BRISK WALK)?: 0 DAYS

## 2023-08-22 ENCOUNTER — OFFICE VISIT (OUTPATIENT)
Dept: FAMILY MEDICINE CLINIC | Age: 43
End: 2023-08-22
Payer: COMMERCIAL

## 2023-08-22 VITALS
DIASTOLIC BLOOD PRESSURE: 90 MMHG | WEIGHT: 209 LBS | TEMPERATURE: 97.6 F | OXYGEN SATURATION: 99 % | HEART RATE: 61 BPM | BODY MASS INDEX: 35.68 KG/M2 | HEIGHT: 64 IN | SYSTOLIC BLOOD PRESSURE: 138 MMHG

## 2023-08-22 DIAGNOSIS — F32.A DEPRESSION, UNSPECIFIED DEPRESSION TYPE: ICD-10-CM

## 2023-08-22 DIAGNOSIS — Z12.31 ENCOUNTER FOR SCREENING MAMMOGRAM FOR MALIGNANT NEOPLASM OF BREAST: ICD-10-CM

## 2023-08-22 DIAGNOSIS — Z76.89 ENCOUNTER TO ESTABLISH CARE: ICD-10-CM

## 2023-08-22 DIAGNOSIS — F41.9 ANXIETY: Primary | ICD-10-CM

## 2023-08-22 DIAGNOSIS — Z13.220 SCREENING FOR HYPERLIPIDEMIA: ICD-10-CM

## 2023-08-22 DIAGNOSIS — Z11.4 SCREENING FOR HIV (HUMAN IMMUNODEFICIENCY VIRUS): ICD-10-CM

## 2023-08-22 DIAGNOSIS — E66.01 SEVERE OBESITY (BMI 35.0-39.9) WITH COMORBIDITY (HCC): ICD-10-CM

## 2023-08-22 DIAGNOSIS — Z11.59 NEED FOR HEPATITIS C SCREENING TEST: ICD-10-CM

## 2023-08-22 DIAGNOSIS — R53.83 OTHER FATIGUE: ICD-10-CM

## 2023-08-22 DIAGNOSIS — I10 HYPERTENSION, UNSPECIFIED TYPE: ICD-10-CM

## 2023-08-22 PROCEDURE — 3080F DIAST BP >= 90 MM HG: CPT | Performed by: NURSE PRACTITIONER

## 2023-08-22 PROCEDURE — 99205 OFFICE O/P NEW HI 60 MIN: CPT | Performed by: NURSE PRACTITIONER

## 2023-08-22 PROCEDURE — 3075F SYST BP GE 130 - 139MM HG: CPT | Performed by: NURSE PRACTITIONER

## 2023-08-22 RX ORDER — SOY ISOFLAV/BCOHOSH/CISSUS QUA 198 MG
CAPSULE ORAL
COMMUNITY
Start: 2022-12-18

## 2023-08-22 RX ORDER — BUSPIRONE HYDROCHLORIDE 5 MG/1
5 TABLET ORAL 3 TIMES DAILY PRN
Qty: 60 TABLET | Refills: 0 | Status: SHIPPED | OUTPATIENT
Start: 2023-08-22 | End: 2023-08-22

## 2023-08-22 RX ORDER — LISINOPRIL 10 MG/1
10 TABLET ORAL DAILY
Qty: 90 TABLET | Refills: 1 | Status: SHIPPED | OUTPATIENT
Start: 2023-08-22

## 2023-08-22 RX ORDER — BUSPIRONE HYDROCHLORIDE 5 MG/1
5 TABLET ORAL 2 TIMES DAILY PRN
Qty: 60 TABLET | Refills: 0 | Status: SHIPPED | OUTPATIENT
Start: 2023-08-22 | End: 2023-09-21

## 2023-08-22 SDOH — ECONOMIC STABILITY: FOOD INSECURITY: WITHIN THE PAST 12 MONTHS, THE FOOD YOU BOUGHT JUST DIDN'T LAST AND YOU DIDN'T HAVE MONEY TO GET MORE.: NEVER TRUE

## 2023-08-22 SDOH — ECONOMIC STABILITY: INCOME INSECURITY: HOW HARD IS IT FOR YOU TO PAY FOR THE VERY BASICS LIKE FOOD, HOUSING, MEDICAL CARE, AND HEATING?: NOT HARD AT ALL

## 2023-08-22 SDOH — ECONOMIC STABILITY: FOOD INSECURITY: WITHIN THE PAST 12 MONTHS, YOU WORRIED THAT YOUR FOOD WOULD RUN OUT BEFORE YOU GOT MONEY TO BUY MORE.: NEVER TRUE

## 2023-08-22 ASSESSMENT — ENCOUNTER SYMPTOMS
CONSTIPATION: 0
SHORTNESS OF BREATH: 0
CHEST TIGHTNESS: 0
NAUSEA: 0
ABDOMINAL DISTENTION: 0
BACK PAIN: 0
VOMITING: 0
ABDOMINAL PAIN: 0
BLOOD IN STOOL: 0
DIARRHEA: 0
COUGH: 0
WHEEZING: 0

## 2023-08-22 ASSESSMENT — PATIENT HEALTH QUESTIONNAIRE - PHQ9
SUM OF ALL RESPONSES TO PHQ9 QUESTIONS 1 & 2: 1
SUM OF ALL RESPONSES TO PHQ QUESTIONS 1-9: 6
7. TROUBLE CONCENTRATING ON THINGS, SUCH AS READING THE NEWSPAPER OR WATCHING TELEVISION: 1
4. FEELING TIRED OR HAVING LITTLE ENERGY: 1
SUM OF ALL RESPONSES TO PHQ QUESTIONS 1-9: 6
5. POOR APPETITE OR OVEREATING: 1
8. MOVING OR SPEAKING SO SLOWLY THAT OTHER PEOPLE COULD HAVE NOTICED. OR THE OPPOSITE, BEING SO FIGETY OR RESTLESS THAT YOU HAVE BEEN MOVING AROUND A LOT MORE THAN USUAL: 0
2. FEELING DOWN, DEPRESSED OR HOPELESS: 0
SUM OF ALL RESPONSES TO PHQ QUESTIONS 1-9: 6
9. THOUGHTS THAT YOU WOULD BE BETTER OFF DEAD, OR OF HURTING YOURSELF: 0
10. IF YOU CHECKED OFF ANY PROBLEMS, HOW DIFFICULT HAVE THESE PROBLEMS MADE IT FOR YOU TO DO YOUR WORK, TAKE CARE OF THINGS AT HOME, OR GET ALONG WITH OTHER PEOPLE: 1
1. LITTLE INTEREST OR PLEASURE IN DOING THINGS: 1
SUM OF ALL RESPONSES TO PHQ QUESTIONS 1-9: 6
3. TROUBLE FALLING OR STAYING ASLEEP: 1
6. FEELING BAD ABOUT YOURSELF - OR THAT YOU ARE A FAILURE OR HAVE LET YOURSELF OR YOUR FAMILY DOWN: 1

## 2023-08-22 NOTE — PROGRESS NOTES
Visit Information    Have you changed or started any medications since your last visit including any over-the-counter medicines, vitamins, or herbal medicines? no   Have you stopped taking any of your medications? Is so, why? -  no  Are you having any side effects from any of your medications? - no    Have you seen any other physician or provider since your last visit?  no   Have you had any other diagnostic tests since your last visit?  no   Have you been seen in the emergency room and/or had an admission in a hospital since we last saw you?  no   Have you had your routine dental cleaning in the past 6 months?  yes -      Do you have an active MyChart account? If no, what is the barrier?   Yes    Patient Care Team:  MARIE Suarez - CNP as PCP - General (Nurse Practitioner)  Vin Reyes MD as PCP - Empaneled Provider  Tenisha Mercado MD as Consulting Physician (Gastroenterology)    Medical History Review  Past Medical, Family, and Social History reviewed and does contribute to the patient presenting condition    Health Maintenance   Topic Date Due    HIV screen  Never done    Hepatitis C screen  Never done    DTaP/Tdap/Td vaccine (1 - Tdap) Never done    COVID-19 Vaccine (2 - Booster for Goldie series) 09/08/2021    Flu vaccine (1) 08/01/2023    Depression Monitoring  02/24/2024    Lipids  09/08/2027    Cervical cancer screen  12/16/2027    Hepatitis A vaccine  Aged Out    Hib vaccine  Aged Out    Meningococcal (ACWY) vaccine  Aged Out    Pneumococcal 0-64 years Vaccine  Aged Out
Results   Component Value Date    LDLCALC 105 05/27/2020    LDLCHOLESTEROL 96 09/08/2022    LDLCHOLESTEROL 97 07/08/2021    LDLCHOLESTEROL 102 09/23/2020     Lab Results   Component Value Date    CHOLHDLRATIO 3.1 09/08/2022    CHOLHDLRATIO 3.3 07/08/2021    CHOLHDLRATIO 3.1 09/23/2020         No results found for: [id number]  No results found for: FOLATE  No results found for: VITD25      Return in about 1 week (around 8/29/2023) for blood pressure check. This note was completed by using the assistance of a speech-recognition program. However, inadvertent computerized transcription errors may be present. Although every effort was made to ensure accuracy, no guarantees can be provided that every mistake has been identified and corrected by editing. An electronic signature was used to authenticate this note.   Electronically signed by MARIE Dover CNP on 8/22/2023 at 9:34 AM

## 2023-08-25 ENCOUNTER — HOSPITAL ENCOUNTER (OUTPATIENT)
Age: 43
Setting detail: SPECIMEN
Discharge: HOME | End: 2023-08-25

## 2023-08-25 DIAGNOSIS — Z11.59 NEED FOR HEPATITIS C SCREENING TEST: ICD-10-CM

## 2023-08-25 DIAGNOSIS — Z13.220 SCREENING FOR HYPERLIPIDEMIA: ICD-10-CM

## 2023-08-25 DIAGNOSIS — R53.83 OTHER FATIGUE: ICD-10-CM

## 2023-08-25 DIAGNOSIS — Z11.4 SCREENING FOR HIV (HUMAN IMMUNODEFICIENCY VIRUS): ICD-10-CM

## 2023-08-25 LAB
25(OH)D3 SERPL-MCNC: 34 NG/ML
ALBUMIN SERPL-MCNC: 4.2 G/DL (ref 3.5–5.2)
ALBUMIN/GLOB SERPL: 1.6 {RATIO} (ref 1–2.5)
ALP SERPL-CCNC: 75 U/L (ref 35–104)
ALT SERPL-CCNC: 15 U/L (ref 5–33)
ANION GAP SERPL CALCULATED.3IONS-SCNC: 18 MMOL/L (ref 9–17)
AST SERPL-CCNC: 20 U/L
BASOPHILS # BLD: 0.06 K/UL (ref 0–0.2)
BASOPHILS NFR BLD: 1 % (ref 0–2)
BILIRUB SERPL-MCNC: 0.5 MG/DL (ref 0.3–1.2)
BUN SERPL-MCNC: 15 MG/DL (ref 6–20)
CALCIUM SERPL-MCNC: 9.1 MG/DL (ref 8.6–10.4)
CHLORIDE SERPL-SCNC: 102 MMOL/L (ref 98–107)
CHOLEST SERPL-MCNC: 148 MG/DL
CHOLESTEROL/HDL RATIO: 2.8
CO2 SERPL-SCNC: 19 MMOL/L (ref 20–31)
CREAT SERPL-MCNC: 1 MG/DL (ref 0.5–0.9)
EOSINOPHIL # BLD: 0.25 K/UL (ref 0–0.44)
EOSINOPHILS RELATIVE PERCENT: 3 % (ref 1–4)
ERYTHROCYTE [DISTWIDTH] IN BLOOD BY AUTOMATED COUNT: 12.7 % (ref 11.8–14.4)
FOLATE SERPL-MCNC: >20 NG/ML
GFR SERPL CREATININE-BSD FRML MDRD: >60 ML/MIN/1.73M2
GLUCOSE SERPL-MCNC: 85 MG/DL (ref 70–99)
HCT VFR BLD AUTO: 44 % (ref 36.3–47.1)
HCV AB SERPL QL IA: NONREACTIVE
HDLC SERPL-MCNC: 52 MG/DL
HGB BLD-MCNC: 14 G/DL (ref 11.9–15.1)
HIV 1+2 AB+HIV1 P24 AG SERPL QL IA: NONREACTIVE
IMM GRANULOCYTES # BLD AUTO: <0.03 K/UL (ref 0–0.3)
IMM GRANULOCYTES NFR BLD: 0 %
LDLC SERPL CALC-MCNC: 81 MG/DL (ref 0–130)
LYMPHOCYTES NFR BLD: 1.61 K/UL (ref 1.1–3.7)
LYMPHOCYTES RELATIVE PERCENT: 21 % (ref 24–43)
MAGNESIUM SERPL-MCNC: 2.1 MG/DL (ref 1.6–2.6)
MCH RBC QN AUTO: 29.2 PG (ref 25.2–33.5)
MCHC RBC AUTO-ENTMCNC: 31.8 G/DL (ref 28.4–34.8)
MCV RBC AUTO: 91.7 FL (ref 82.6–102.9)
MONOCYTES NFR BLD: 0.44 K/UL (ref 0.1–1.2)
MONOCYTES NFR BLD: 6 % (ref 3–12)
NEUTROPHILS NFR BLD: 69 % (ref 36–65)
NEUTS SEG NFR BLD: 5.26 K/UL (ref 1.5–8.1)
NRBC BLD-RTO: 0 PER 100 WBC
PLATELET # BLD AUTO: 245 K/UL (ref 138–453)
PMV BLD AUTO: 11.7 FL (ref 8.1–13.5)
POTASSIUM SERPL-SCNC: 4.6 MMOL/L (ref 3.7–5.3)
PROT SERPL-MCNC: 6.9 G/DL (ref 6.4–8.3)
RBC # BLD AUTO: 4.8 M/UL (ref 3.95–5.11)
SODIUM SERPL-SCNC: 139 MMOL/L (ref 135–144)
TRIGL SERPL-MCNC: 76 MG/DL
TSH SERPL DL<=0.05 MIU/L-ACNC: 0.63 UIU/ML (ref 0.3–5)
VIT B12 SERPL-MCNC: 1022 PG/ML (ref 232–1245)
WBC OTHER # BLD: 7.6 K/UL (ref 3.5–11.3)

## 2023-08-30 ENCOUNTER — NURSE ONLY (OUTPATIENT)
Dept: FAMILY MEDICINE CLINIC | Age: 43
End: 2023-08-30

## 2023-08-30 VITALS — DIASTOLIC BLOOD PRESSURE: 72 MMHG | SYSTOLIC BLOOD PRESSURE: 110 MMHG

## 2023-08-30 DIAGNOSIS — Z01.30 BLOOD PRESSURE CHECK: Primary | ICD-10-CM

## 2023-08-30 NOTE — PROGRESS NOTES
Mariusz Chen is being seen today in office for a LAB/MA visit for a Blood Pressure Recheck. Mariusz Chen was last seen 8/22/2023 and her Blood Pressure was:   BP Readings from Last 1 Encounters:   08/22/23 (!) 138/90         Blood Pressure taken today 8/30/2023 was:  (1st blood pressure reading): 110/72.      Pt did bring in bs log, scanned into media  Enoc Brush

## 2023-08-30 NOTE — PROGRESS NOTES
Ashkan Tarango is being seen today in office for a LAB/MA visit for a Blood Pressure Recheck. Ashkan Tarango was last seen 8/22/2023 and her Blood Pressure was:   BP Readings from Last 1 Encounters:   08/30/23 110/72         Blood Pressure taken today 8/30/2023 was:  (1st blood pressure reading): 110/72. Pt did bring in bs log, scanned into media  Vitals:    08/30/23 0839   BP: 110/72     Improved B/P today. Continue current treatment.      Zeynep Cano, APRCHERYL - CNP

## 2023-09-22 ENCOUNTER — HOSPITAL ENCOUNTER (OUTPATIENT)
Dept: WOMENS IMAGING | Age: 43
Discharge: HOME | End: 2023-09-22
Payer: COMMERCIAL

## 2023-09-22 VITALS — BODY MASS INDEX: 35.68 KG/M2 | WEIGHT: 209 LBS | HEIGHT: 64 IN

## 2023-09-22 DIAGNOSIS — Z12.31 ENCOUNTER FOR SCREENING MAMMOGRAM FOR MALIGNANT NEOPLASM OF BREAST: ICD-10-CM

## 2023-09-22 PROCEDURE — 77063 BREAST TOMOSYNTHESIS BI: CPT

## 2023-09-24 DIAGNOSIS — R92.8 ABNORMAL MAMMOGRAM OF LEFT BREAST: Primary | ICD-10-CM

## 2023-09-24 NOTE — RESULT ENCOUNTER NOTE
Please notify patient that her mammogram is showing some asymmetry recommending further mammogram follow up. May need ultrasound. Diagnostic mammogram ordered. Will send referral to Dr Nayeli Huff as well.

## 2023-09-25 ENCOUNTER — TELEPHONE (OUTPATIENT)
Dept: SURGERY | Age: 43
End: 2023-09-25

## 2023-09-25 NOTE — TELEPHONE ENCOUNTER
9/25/23-I spoke to pt and she would like to have her ultrasound and diagnostic mamm before deciding to schedule with our office.  I told her that was fine and that the referral is good for 1 year

## 2023-09-26 ENCOUNTER — PATIENT MESSAGE (OUTPATIENT)
Dept: FAMILY MEDICINE CLINIC | Age: 43
End: 2023-09-26

## 2023-10-03 ENCOUNTER — PATIENT MESSAGE (OUTPATIENT)
Dept: FAMILY MEDICINE CLINIC | Age: 43
End: 2023-10-03

## 2023-10-04 DIAGNOSIS — F41.9 ANXIETY: Primary | ICD-10-CM

## 2023-10-04 RX ORDER — BUSPIRONE HYDROCHLORIDE 5 MG/1
10 TABLET ORAL 2 TIMES DAILY PRN
Qty: 60 TABLET | Refills: 0 | Status: SHIPPED | OUTPATIENT
Start: 2023-10-04 | End: 2023-11-03

## 2023-10-04 NOTE — TELEPHONE ENCOUNTER
From: Juan Schroeder  To: Oswaldo Dueñas  Sent: 10/3/2023 4:16 PM EDT  Subject: Buspar Dose    I need to request a refill of the buspirone as I am just about out. I would like to try a slight increase in dose if you think that would be ok. Thanks!

## 2023-10-20 ENCOUNTER — HOSPITAL ENCOUNTER (OUTPATIENT)
Dept: WOMENS IMAGING | Age: 43
Discharge: HOME | End: 2023-10-20
Payer: COMMERCIAL

## 2023-10-20 ENCOUNTER — HOSPITAL ENCOUNTER (OUTPATIENT)
Dept: WOMENS IMAGING | Age: 43
End: 2023-10-20
Payer: COMMERCIAL

## 2023-10-20 DIAGNOSIS — R92.8 ABNORMAL MAMMOGRAM: ICD-10-CM

## 2023-10-20 DIAGNOSIS — R92.8 ABNORMAL MAMMOGRAM OF LEFT BREAST: ICD-10-CM

## 2023-10-20 PROCEDURE — G0279 TOMOSYNTHESIS, MAMMO: HCPCS

## 2023-10-20 PROCEDURE — 76642 ULTRASOUND BREAST LIMITED: CPT

## 2023-10-22 NOTE — RESULT ENCOUNTER NOTE
Please notify patient that the breast US showing that the new asymmetry findings are likely to be benign. Recommend repeat left breat mammogram and US in 6 months.

## 2023-10-27 DIAGNOSIS — F41.9 ANXIETY: ICD-10-CM

## 2023-10-27 DIAGNOSIS — F32.A DEPRESSION, UNSPECIFIED DEPRESSION TYPE: ICD-10-CM

## 2023-10-27 RX ORDER — DULOXETIN HYDROCHLORIDE 60 MG/1
CAPSULE, DELAYED RELEASE ORAL
Qty: 90 CAPSULE | Refills: 0 | OUTPATIENT
Start: 2023-10-27

## 2023-10-27 NOTE — TELEPHONE ENCOUNTER
Please Approve or Refuse.   Send to Pharmacy per Pt's Request:      Next Visit Date:  11/17/2023   Last Visit Date: 8/22/2023    No results found for: \"LABA1C\"          ( goal A1C is < 7)   BP Readings from Last 3 Encounters:   08/30/23 110/72   08/22/23 (!) 138/90   07/20/23 124/78          (goal 120/80)  BUN   Date Value Ref Range Status   08/25/2023 15 6 - 20 mg/dL Final     Creatinine   Date Value Ref Range Status   08/25/2023 1.0 (H) 0.5 - 0.9 mg/dL Final     Potassium   Date Value Ref Range Status   08/25/2023 4.6 3.7 - 5.3 mmol/L Final

## 2023-10-29 RX ORDER — BUSPIRONE HYDROCHLORIDE 5 MG/1
10 TABLET ORAL 2 TIMES DAILY PRN
Qty: 60 TABLET | Refills: 0 | Status: SHIPPED | OUTPATIENT
Start: 2023-10-29 | End: 2023-11-28

## 2023-10-30 DIAGNOSIS — F32.A DEPRESSION, UNSPECIFIED DEPRESSION TYPE: ICD-10-CM

## 2023-10-30 RX ORDER — DULOXETIN HYDROCHLORIDE 60 MG/1
CAPSULE, DELAYED RELEASE ORAL
Qty: 90 CAPSULE | Refills: 3 | Status: SHIPPED | OUTPATIENT
Start: 2023-10-30

## 2023-11-17 ENCOUNTER — OFFICE VISIT (OUTPATIENT)
Dept: FAMILY MEDICINE CLINIC | Age: 43
End: 2023-11-17
Payer: COMMERCIAL

## 2023-11-17 VITALS
BODY MASS INDEX: 41.03 KG/M2 | OXYGEN SATURATION: 98 % | WEIGHT: 209 LBS | HEIGHT: 60 IN | DIASTOLIC BLOOD PRESSURE: 78 MMHG | SYSTOLIC BLOOD PRESSURE: 112 MMHG | TEMPERATURE: 98.6 F | HEART RATE: 76 BPM

## 2023-11-17 DIAGNOSIS — F32.A DEPRESSION, UNSPECIFIED DEPRESSION TYPE: ICD-10-CM

## 2023-11-17 DIAGNOSIS — Z11.59 SCREENING FOR VIRAL DISEASE: ICD-10-CM

## 2023-11-17 DIAGNOSIS — F41.9 ANXIETY: Primary | ICD-10-CM

## 2023-11-17 DIAGNOSIS — R79.89 ELEVATED SERUM CREATININE: ICD-10-CM

## 2023-11-17 PROCEDURE — 99215 OFFICE O/P EST HI 40 MIN: CPT | Performed by: NURSE PRACTITIONER

## 2023-11-17 RX ORDER — BUSPIRONE HYDROCHLORIDE 5 MG/1
15 TABLET ORAL 2 TIMES DAILY PRN
Qty: 180 TABLET | Refills: 3 | Status: SHIPPED | OUTPATIENT
Start: 2023-11-17 | End: 2024-02-15

## 2023-11-17 SDOH — ECONOMIC STABILITY: FOOD INSECURITY: WITHIN THE PAST 12 MONTHS, YOU WORRIED THAT YOUR FOOD WOULD RUN OUT BEFORE YOU GOT MONEY TO BUY MORE.: NEVER TRUE

## 2023-11-17 SDOH — ECONOMIC STABILITY: FOOD INSECURITY: WITHIN THE PAST 12 MONTHS, THE FOOD YOU BOUGHT JUST DIDN'T LAST AND YOU DIDN'T HAVE MONEY TO GET MORE.: NEVER TRUE

## 2023-11-17 SDOH — ECONOMIC STABILITY: INCOME INSECURITY: HOW HARD IS IT FOR YOU TO PAY FOR THE VERY BASICS LIKE FOOD, HOUSING, MEDICAL CARE, AND HEATING?: NOT HARD AT ALL

## 2023-11-17 ASSESSMENT — ANXIETY QUESTIONNAIRES
2. NOT BEING ABLE TO STOP OR CONTROL WORRYING: 2
5. BEING SO RESTLESS THAT IT IS HARD TO SIT STILL: 2
7. FEELING AFRAID AS IF SOMETHING AWFUL MIGHT HAPPEN: 2
6. BECOMING EASILY ANNOYED OR IRRITABLE: 2
4. TROUBLE RELAXING: 2
GAD7 TOTAL SCORE: 14
3. WORRYING TOO MUCH ABOUT DIFFERENT THINGS: 2
1. FEELING NERVOUS, ANXIOUS, OR ON EDGE: 2
IF YOU CHECKED OFF ANY PROBLEMS ON THIS QUESTIONNAIRE, HOW DIFFICULT HAVE THESE PROBLEMS MADE IT FOR YOU TO DO YOUR WORK, TAKE CARE OF THINGS AT HOME, OR GET ALONG WITH OTHER PEOPLE: SOMEWHAT DIFFICULT

## 2023-11-17 ASSESSMENT — PATIENT HEALTH QUESTIONNAIRE - PHQ9
SUM OF ALL RESPONSES TO PHQ QUESTIONS 1-9: 10
9. THOUGHTS THAT YOU WOULD BE BETTER OFF DEAD, OR OF HURTING YOURSELF: 0
7. TROUBLE CONCENTRATING ON THINGS, SUCH AS READING THE NEWSPAPER OR WATCHING TELEVISION: 1
4. FEELING TIRED OR HAVING LITTLE ENERGY: 2
6. FEELING BAD ABOUT YOURSELF - OR THAT YOU ARE A FAILURE OR HAVE LET YOURSELF OR YOUR FAMILY DOWN: 1
8. MOVING OR SPEAKING SO SLOWLY THAT OTHER PEOPLE COULD HAVE NOTICED. OR THE OPPOSITE, BEING SO FIGETY OR RESTLESS THAT YOU HAVE BEEN MOVING AROUND A LOT MORE THAN USUAL: 0
2. FEELING DOWN, DEPRESSED OR HOPELESS: 2
5. POOR APPETITE OR OVEREATING: 2
3. TROUBLE FALLING OR STAYING ASLEEP: 2
SUM OF ALL RESPONSES TO PHQ QUESTIONS 1-9: 10
10. IF YOU CHECKED OFF ANY PROBLEMS, HOW DIFFICULT HAVE THESE PROBLEMS MADE IT FOR YOU TO DO YOUR WORK, TAKE CARE OF THINGS AT HOME, OR GET ALONG WITH OTHER PEOPLE: 0
SUM OF ALL RESPONSES TO PHQ QUESTIONS 1-9: 10
SUM OF ALL RESPONSES TO PHQ QUESTIONS 1-9: 10

## 2023-11-17 ASSESSMENT — ENCOUNTER SYMPTOMS
ABDOMINAL DISTENTION: 0
VOMITING: 0
BLOOD IN STOOL: 0
DIARRHEA: 0
CONSTIPATION: 0
NAUSEA: 0
ABDOMINAL PAIN: 0
COUGH: 0
BACK PAIN: 0
WHEEZING: 0
CHEST TIGHTNESS: 0
SHORTNESS OF BREATH: 0

## 2023-11-17 ASSESSMENT — COLUMBIA-SUICIDE SEVERITY RATING SCALE - C-SSRS
3. HAVE YOU BEEN THINKING ABOUT HOW YOU MIGHT KILL YOURSELF?: NO
7. DID THIS OCCUR IN THE LAST THREE MONTHS: NO
5. HAVE YOU STARTED TO WORK OUT OR WORKED OUT THE DETAILS OF HOW TO KILL YOURSELF? DO YOU INTEND TO CARRY OUT THIS PLAN?: NO
4. HAVE YOU HAD THESE THOUGHTS AND HAD SOME INTENTION OF ACTING ON THEM?: NO

## 2023-11-17 NOTE — PROGRESS NOTES
used to authenticate this note.   Electronically signed by MARIE Barclay CNP on 11/17/2023 at 11:52 AM

## 2023-11-26 ENCOUNTER — APPOINTMENT (OUTPATIENT)
Dept: GENERAL RADIOLOGY | Age: 43
End: 2023-11-26
Payer: COMMERCIAL

## 2023-11-26 ENCOUNTER — HOSPITAL ENCOUNTER (EMERGENCY)
Age: 43
Discharge: HOME | End: 2023-11-26
Attending: EMERGENCY MEDICINE
Payer: COMMERCIAL

## 2023-11-26 ENCOUNTER — NURSE TRIAGE (OUTPATIENT)
Dept: OTHER | Facility: CLINIC | Age: 43
End: 2023-11-26

## 2023-11-26 VITALS
SYSTOLIC BLOOD PRESSURE: 161 MMHG | HEIGHT: 64 IN | WEIGHT: 209 LBS | HEART RATE: 80 BPM | BODY MASS INDEX: 35.68 KG/M2 | TEMPERATURE: 97.8 F | OXYGEN SATURATION: 100 % | DIASTOLIC BLOOD PRESSURE: 93 MMHG | RESPIRATION RATE: 14 BRPM

## 2023-11-26 DIAGNOSIS — S82.891A CLOSED FRACTURE OF RIGHT ANKLE, INITIAL ENCOUNTER: Primary | ICD-10-CM

## 2023-11-26 PROCEDURE — 73630 X-RAY EXAM OF FOOT: CPT

## 2023-11-26 PROCEDURE — 73610 X-RAY EXAM OF ANKLE: CPT

## 2023-11-26 PROCEDURE — 99283 EMERGENCY DEPT VISIT LOW MDM: CPT

## 2023-11-26 ASSESSMENT — PAIN - FUNCTIONAL ASSESSMENT: PAIN_FUNCTIONAL_ASSESSMENT: 0-10

## 2023-11-26 ASSESSMENT — PAIN SCALES - GENERAL: PAINLEVEL_OUTOF10: 8

## 2023-11-27 ENCOUNTER — OFFICE VISIT (OUTPATIENT)
Dept: PODIATRY | Age: 43
End: 2023-11-27
Payer: COMMERCIAL

## 2023-11-27 VITALS — WEIGHT: 209 LBS | BODY MASS INDEX: 35.68 KG/M2 | HEIGHT: 64 IN

## 2023-11-27 DIAGNOSIS — M25.571 ACUTE RIGHT ANKLE PAIN: ICD-10-CM

## 2023-11-27 DIAGNOSIS — S82.891A CLOSED AVULSION FRACTURE OF RIGHT ANKLE, INITIAL ENCOUNTER: Primary | ICD-10-CM

## 2023-11-27 PROCEDURE — 99204 OFFICE O/P NEW MOD 45 MIN: CPT | Performed by: PODIATRIST

## 2023-11-27 NOTE — PROGRESS NOTES
is a 38 y.o. female with:    Diagnosis Orders   1. Closed avulsion fracture of right ankle, initial encounter        2. Acute right ankle pain              Plan: Patient examined and evaluated. Current condition and treatment options discussed in detail. Discussed conservative and surgical options with the patient. Advised pt to her treatment options and reviewed x rays with the patient      A prefabricated tall and pneumatic Ankle-Foot Orthosis/CAM walker was dispensed and applied at this visit. Due to the patient's diagnosis and related symptoms this equipment is medically necessary for treatment. The function of this device is to restrict and limit motion, provide stabilization, immobilization, and compression to the affected area to reduce swelling. The goals and function of this device was explained in detail to the patient. Upon gait analysis, the device appeared to be fitting well and the patient states that the device is comfortable at this time. The patient was shown and told in detail how to properly apply, remove, wear and care for the device. Patient was able to apply the device properly and was able to ambulate without distress. At the time the device was dispensed, it was suitable for the condition and was not substandard. No guarantees were given and precautions were reviewed. Patient was instructed not to drive a car when wearing this device. All questions were answered. Written instructions and warranty information was given along with the list of the thirty (30) Durable Medical Equipment Supplier Guidelines. All questions were answered. Discussed signs and symptoms of deep venous thrombosis and pulmonary embolus. Call if they occur. We also discussed mechanical methods to prevent deep venous thrombosis and pulmonary embolus    X rays show the above results    Pt to stay in cam boot at all times to keep ankle in good positioning for 3 weeks    . Verbal and written instructions given to patient.

## 2023-11-27 NOTE — ED PROVIDER NOTES
[address] [facility]  eMERGENCY dEPARTMENT eNCOUnter      Pt Name: Julio Cesar Galvan  MRN: [id number]  [facility] 1980  Date of evaluation: 11/26/2023  Provider: Sydney Selby PA-C    CHIEF COMPLAINT       Chief Complaint   Patient presents with    Fall    Ankle Pain     Right side    Foot Pain     Right side           HISTORY OF PRESENT ILLNESS  (Location/Symptom, Timing/Onset, Context/Setting, Quality, Duration, Modifying Factors, Severity.)   Julio Cesar Galvan is a 38 y.o. female who presents to the emergency department with her  with the complaints of right ankle pain. Patient was vacuuming the stairs when she fell backwards extending her right ankle. She denies any other injuries, states that she only fell about 1-2 steps but has been complaining of right ankle pain. Nursing Notes were reviewed. REVIEW OF SYSTEMS    (2-9 systems for level 4, 10 or more for level 5)     Review of Systems   Right ankle pain    Except as noted above the remainder of the review of systems was reviewed and negative.        PAST MEDICAL HISTORY     Past Medical History:   Diagnosis Date    Dysphagia     Fundic gland polyposis of stomach     Gastritis     GERD (gastroesophageal reflux disease)     Prolonged emergence from general anesthesia     Nina's ring 10/31/2016    Unspecified sinusitis (chronic)     Wears contact lenses      None otherwise stated in nurses notes    SURGICAL HISTORY       Past Surgical History:   Procedure Laterality Date    KNEE ARTHROSCOPY Right 10/31/2017    KNEE SURGERY Right 06/12/2018    Partial Knee Replacement    [place] CPTR-ASST SURGICAL NAVIGATION IMAGE-LESS Right 6/12/2018    NAVIO ROBOTIC PARTIAL KNEE ARTHROPLASTY  (SMITH&NEPHEW, FEMORAL NERVE AND POPLITEAL BLOCK PRE-OP, 3080 TABLE)  *ADVANCED performed by Amada Ba DO at [facility]  2003    UPPER GASTROINTESTINAL ENDOSCOPY  10/31/2016    nina's; gastritis;

## 2023-12-15 ENCOUNTER — HOSPITAL ENCOUNTER (OUTPATIENT)
Age: 43
Setting detail: SPECIMEN
Discharge: HOME | End: 2023-12-15

## 2023-12-15 ENCOUNTER — OFFICE VISIT (OUTPATIENT)
Dept: BEHAVIORAL/MENTAL HEALTH CLINIC | Age: 43
End: 2023-12-15

## 2023-12-15 DIAGNOSIS — F41.9 ANXIETY: ICD-10-CM

## 2023-12-15 DIAGNOSIS — R79.89 ELEVATED SERUM CREATININE: ICD-10-CM

## 2023-12-15 DIAGNOSIS — Z11.59 SCREENING FOR VIRAL DISEASE: ICD-10-CM

## 2023-12-15 DIAGNOSIS — F33.0 MILD EPISODE OF RECURRENT MAJOR DEPRESSIVE DISORDER (HCC): Primary | ICD-10-CM

## 2023-12-15 LAB
ANION GAP SERPL CALCULATED.3IONS-SCNC: 8 MMOL/L (ref 9–17)
BUN SERPL-MCNC: 15 MG/DL (ref 6–20)
CALCIUM SERPL-MCNC: 9.6 MG/DL (ref 8.6–10.4)
CHLORIDE SERPL-SCNC: 102 MMOL/L (ref 98–107)
CO2 SERPL-SCNC: 26 MMOL/L (ref 20–31)
CREAT SERPL-MCNC: 0.8 MG/DL (ref 0.5–0.9)
ERYTHROCYTE [DISTWIDTH] IN BLOOD BY AUTOMATED COUNT: 12.9 % (ref 11.8–14.4)
GFR SERPL CREATININE-BSD FRML MDRD: >60 ML/MIN/1.73M2
GLUCOSE SERPL-MCNC: 79 MG/DL (ref 70–99)
HCT VFR BLD AUTO: 44.1 % (ref 36.3–47.1)
HGB BLD-MCNC: 14.1 G/DL (ref 11.9–15.1)
MCH RBC QN AUTO: 29.3 PG (ref 25.2–33.5)
MCHC RBC AUTO-ENTMCNC: 32 G/DL (ref 28.4–34.8)
MCV RBC AUTO: 91.7 FL (ref 82.6–102.9)
NRBC BLD-RTO: 0 PER 100 WBC
PLATELET # BLD AUTO: 274 K/UL (ref 138–453)
PMV BLD AUTO: 10.8 FL (ref 8.1–13.5)
POTASSIUM SERPL-SCNC: 4.9 MMOL/L (ref 3.7–5.3)
RBC # BLD AUTO: 4.81 M/UL (ref 3.95–5.11)
SODIUM SERPL-SCNC: 136 MMOL/L (ref 135–144)
WBC OTHER # BLD: 8.2 K/UL (ref 3.5–11.3)

## 2023-12-15 PROCEDURE — 90791 PSYCH DIAGNOSTIC EVALUATION: CPT | Performed by: SOCIAL WORKER

## 2023-12-15 NOTE — PROGRESS NOTES
her aunt. She doesn't have a relationship with her dad, relationship with mom is \"ok\", they  when she was young. She lived with mom until age 13 and then moved in with dad. Baptist/Spirituality: Pt reported she used to attend Yazidism on Sundays. DRUG AND ALCOHOL CURRENT USE/HISTORY  TOBACCO:  She reports that she has never smoked. She has never used smokeless tobacco.  ALCOHOL:  She reports that she does not currently use alcohol. OTHER SUBSTANCES: She reports no history of drug use. ASSESSMENT  Kalpesh Gandara presented to the appointment today for evaluation and treatment of symptoms of depression and anxiety. She is currently deemed no risk to herself or others and meets criteria for Major Depressive Disorder and Anxiety. She will benefit from a medication evaluation to assess if medications could be helpful in treating depressive symptoms. Sheyla's depressive symptoms are not well controlled at this time. She will also benefit from brief and solution-focused consultation to address cognitive and behavioral interventions for depressive symptoms. Vish Vincent was in agreement with recommendations. 11/17/2023    10:17 AM 8/22/2023     7:36 AM 2/24/2023    11:32 AM 12/16/2022     1:52 PM 6/18/2021    11:04 AM 3/13/2020     2:03 PM 3/13/2020     2:02 PM   PHQ Scores   PHQ2 Score  1 4 4 0 2 3   PHQ9 Score 10 6 16 4 0 10 3     Interpretation of Total Score Depression Severity: 1-4 = Minimal depression, 5-9 = Mild depression, 10-14 = Moderate depression, 15-19 = Moderately severe depression, 20-27 = Severe depression    How often pt has had thoughts of death or hurting self (if PHQ positive for depression):           11/17/2023    10:17 AM 2/24/2023    11:34 AM   STEVE 7 SCORE   STEVE-7 Total Score 14 13     Interpretation of STEVE-7 score: 5-9 = mild anxiety, 10-14 = moderate anxiety, 15+ = severe anxiety. Recommend referral to behavioral health for scores 10 or greater.       DIAGNOSIS  Vish Vincent was seen

## 2023-12-16 LAB — HBV SURFACE AB SERPL IA-ACNC: >1000 MIU/ML

## 2023-12-29 ENCOUNTER — OFFICE VISIT (OUTPATIENT)
Dept: OBGYN CLINIC | Age: 43
End: 2023-12-29
Payer: COMMERCIAL

## 2023-12-29 VITALS
BODY MASS INDEX: 38.57 KG/M2 | SYSTOLIC BLOOD PRESSURE: 161 MMHG | DIASTOLIC BLOOD PRESSURE: 94 MMHG | WEIGHT: 209.6 LBS | HEIGHT: 62 IN

## 2023-12-29 DIAGNOSIS — N91.2 AMENORRHEA: ICD-10-CM

## 2023-12-29 DIAGNOSIS — R92.8 ABNORMAL MAMMOGRAM OF LEFT BREAST: ICD-10-CM

## 2023-12-29 DIAGNOSIS — Z12.31 ENCOUNTER FOR SCREENING MAMMOGRAM FOR BREAST CANCER: ICD-10-CM

## 2023-12-29 DIAGNOSIS — N92.6 IRREGULAR MENSTRUAL CYCLE: ICD-10-CM

## 2023-12-29 DIAGNOSIS — Z01.419 ENCOUNTER FOR GYNECOLOGICAL EXAMINATION: Primary | ICD-10-CM

## 2023-12-29 PROCEDURE — 99396 PREV VISIT EST AGE 40-64: CPT | Performed by: NURSE PRACTITIONER

## 2023-12-29 RX ORDER — ACETAMINOPHEN AND CODEINE PHOSPHATE 120; 12 MG/5ML; MG/5ML
1 SOLUTION ORAL DAILY
Qty: 3 TABLET | Refills: 4 | Status: SHIPPED | OUTPATIENT
Start: 2023-12-29

## 2023-12-29 NOTE — PROGRESS NOTES
[address] \[facility]\""  [facility] Barbara Vega  [address] [place] [address] RONNIE Bucio  Dept: [phone]      12/29/23    [address] Annual Exam      CHIEF COMPLAINT:    Chief Complaint   Patient presents with    Annual Exam     Last pap 12/16/22 WNL HPV- last mammogram 9/22/23 & 10/20/23    Vaginal Bleeding     Had brown spotting on 12/24               Blood pressure (!) 161/94, height 1.575 m (5' 2\"), weight 95.1 kg (209 lb 9.6 oz), not currently breastfeeding. HPI :     Niurka Neil 1980 is a 37 y.o. Tarsha Jose Antonio  who is here for her annual exam. She was seen last year as a new patient. I changed her cocs to progesterone only due to increased BP. She did have labs in 2021 that were consistent with menopause but because of symptoms Dr. Nohemy Jones kept her on ocps. She is doing well.   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[facility]  _____________________________________________________________________  Past Medical History:   Diagnosis Date    Depression 2000    Dysphagia     Fundic gland polyposis of stomach     Gastritis     GERD (gastroesophageal reflux disease)     Menopausal symptoms     Prolonged emergence from general anesthesia     Schatzki's ring 10/31/2016    Unspecified sinusitis (chronic)     Wears contact lenses                                                                    Past Surgical History:   Procedure Laterality Date    KNEE ARTHROSCOPY Right 10/31/2017    KNEE SURGERY Right 06/12/2018    Partial Knee Replacement    [place] CPTR-ASST SURGICAL NAVIGATION IMAGE-LESS Right 6/12/2018    NAVIO ROBOTIC PARTIAL KNEE ARTHROPLASTY  (SMITH&NEPHEW, FEMORAL NERVE AND POPLITEAL BLOCK PRE-OP, 3080 TABLE)  *ADVANCED performed by Satnam Navarro DO at [address] [place]

## 2024-01-12 ENCOUNTER — OFFICE VISIT (OUTPATIENT)
Dept: BEHAVIORAL/MENTAL HEALTH CLINIC | Age: 44
End: 2024-01-12
Payer: COMMERCIAL

## 2024-01-12 DIAGNOSIS — F41.9 ANXIETY: ICD-10-CM

## 2024-01-12 DIAGNOSIS — F33.0 MILD EPISODE OF RECURRENT MAJOR DEPRESSIVE DISORDER (HCC): Primary | ICD-10-CM

## 2024-01-12 PROCEDURE — 90834 PSYTX W PT 45 MINUTES: CPT | Performed by: SOCIAL WORKER

## 2024-01-12 NOTE — PROGRESS NOTES
ADULT BEHAVIORAL HEALTH FOLLOW UP  LINDA Dorantes  Licensed Independent          Visit Date: 1/12/2024   Time of appointment: 12:07 PM   Time spent with Patient: 45 minutes.   This is patient's second appointment.    Reason for Consult:  Anxiety and Depression     PCP:  Rafal Anne, APRN - CNP      Pt provided informed consent for the behavioral health program. Discussed with patient model of service to include the limits of confidentiality (i.e. abuse reporting, suicide intervention, etc.) and short-term intervention focused approach. Pt indicated understanding.    SUBJECTIVE  Sheyla is a 43 y.o. female who presents for follow up of depression and anxiety.     Sheyla reported she has continued to experience flat mood, going through the motions. She still feels numb to any emotions. She reported she has not had her hormones tested, but OBGYN has provided her with orders for labs to complete. She reported she did have hormones tested once years ago. She is now taking a birth-control that is progesterone only and is completely menopausal. She reported prior to menopause she was also having the same issues with lack of emotions. Sheyla reported she did feel surprised and enjoyed her  getting her flowers for their 15th year anniversary. Sheyal stated this was a big deal, as her  does not normally go out of his way to do things that makes her feel this way. She shared he even included a poem he wrote. She shared throughout time she has felt she is doing everything for everyone, and has lost that time for herself. Sheyla shared that she has also felt frustrated when her  or other family members make comments about things that are important to them, but not of priority to her. Sheyla shared an example of this is her  making comments about her weight and her hair becoming gray, as well as her mom making comments about her wearing the same style of clothing instead of something

## 2024-01-16 DIAGNOSIS — N93.9 ABNORMAL UTERINE BLEEDING: Primary | ICD-10-CM

## 2024-01-17 ENCOUNTER — HOSPITAL ENCOUNTER (OUTPATIENT)
Age: 44
Setting detail: SPECIMEN
Discharge: HOME | End: 2024-01-17

## 2024-01-17 DIAGNOSIS — N91.2 AMENORRHEA: ICD-10-CM

## 2024-01-18 ENCOUNTER — TELEPHONE (OUTPATIENT)
Dept: OBGYN CLINIC | Age: 44
End: 2024-01-18

## 2024-01-18 LAB
ESTRADIOL LEVEL: 59 PG/ML
FSH SERPL-ACNC: 4 MIU/ML

## 2024-01-18 NOTE — RESULT ENCOUNTER NOTE
CERTIFICATE OF RETURN TO WORK        This is to certify that Vee Rose has been under my care on 10/5/20 and can return to regular WORK on 10/7/20.          RESTRICTIONS: No heavy lifting greater than 10 lbs for 4 weeks        REMARKS: Please contact our office with any questions or concerns at [phone] or fax [phone]              SIGNATURE:___________________________________________ 10/5/2020                                                      Meli Mar MD                         So this time her labs were not consistent with menopause.  They can roller coaster like that.  She is ok if she has some occasional bleeding with her pills.  I do still want her to get the ultrasound and if anything is abnormal we will make a plan.

## 2024-01-18 NOTE — TELEPHONE ENCOUNTER
Pt was called with lab results   Is still taking micronor daily but wants to be sure that she won't get pregnant   She won't discontinue pills without consulting with you   Has US scheduled for 1/26/24

## 2024-01-18 NOTE — TELEPHONE ENCOUNTER
----- Message from MARIE Parkinson CNP sent at 1/18/2024 11:59 AM EST -----  So this time her labs were not consistent with menopause.  They can roller coaster like that.  She is ok if she has some occasional bleeding with her pills.  I do still want her to get the ultrasound and if anything is abnormal we will make a plan.

## 2024-01-26 ENCOUNTER — HOSPITAL ENCOUNTER (OUTPATIENT)
Dept: WOMENS IMAGING | Age: 44
Discharge: HOME | End: 2024-01-26
Payer: COMMERCIAL

## 2024-01-26 DIAGNOSIS — N93.9 ABNORMAL UTERINE BLEEDING: ICD-10-CM

## 2024-01-26 PROCEDURE — 76830 TRANSVAGINAL US NON-OB: CPT

## 2024-01-30 ENCOUNTER — TELEPHONE (OUTPATIENT)
Dept: OBGYN CLINIC | Age: 44
End: 2024-01-30

## 2024-01-30 NOTE — TELEPHONE ENCOUNTER
Pt was called with US results   Pt still having very irregular bleeding taking Micronor every day same time denies missing pills   Had bleeding 12/24 again  1/14-1/18 & 1/28-through today (but lighter today)  Just wants to make you aware

## 2024-01-30 NOTE — TELEPHONE ENCOUNTER
----- Message from MARIE Parkinson CNP sent at 1/29/2024  8:50 AM EST -----  Her ultrasound was essentially normal.  Small cyst which will resolve on it's own.  The architecture of the uterus is consistent with adenomyosis which is not harmful but can cause heavy, painful cycles. We will just continue to monitor

## 2024-02-15 RX ORDER — LISINOPRIL 10 MG/1
10 TABLET ORAL DAILY
Qty: 90 TABLET | Refills: 1 | OUTPATIENT
Start: 2024-02-15

## 2024-02-16 NOTE — TELEPHONE ENCOUNTER
Please Approve or Refuse.  Send to Pharmacy per Pt's Request:      Next Visit Date:  3/22/2024   Last Visit Date: 11/17/2023    No results found for: \"LABA1C\"          ( goal A1C is < 7)   BP Readings from Last 3 Encounters:   12/29/23 (!) 161/94   11/26/23 (!) 161/93   11/17/23 112/78          (goal 120/80)  BUN   Date Value Ref Range Status   12/15/2023 15 6 - 20 mg/dL Final     Creatinine   Date Value Ref Range Status   12/15/2023 0.8 0.5 - 0.9 mg/dL Final     Potassium   Date Value Ref Range Status   12/15/2023 4.9 3.7 - 5.3 mmol/L Final

## 2024-02-17 RX ORDER — LISINOPRIL 10 MG/1
10 TABLET ORAL DAILY
Qty: 90 TABLET | Refills: 1 | Status: SHIPPED | OUTPATIENT
Start: 2024-02-17

## 2024-03-21 PROBLEM — M72.2 PLANTAR FASCIAL FIBROMATOSIS: Status: ACTIVE | Noted: 2024-03-21

## 2024-03-21 PROBLEM — M79.671 PAIN IN RIGHT FOOT: Status: ACTIVE | Noted: 2024-03-21

## 2024-03-21 PROBLEM — E66.9 OBESITY (BMI 35.0-39.9 WITHOUT COMORBIDITY): Status: ACTIVE | Noted: 2020-11-30

## 2024-03-21 PROBLEM — M77.31 CALCANEAL SPUR OF RIGHT FOOT: Status: ACTIVE | Noted: 2024-03-21

## 2024-03-21 PROBLEM — M77.32 CALCANEAL SPUR OF LEFT FOOT: Status: ACTIVE | Noted: 2024-03-21

## 2024-03-21 PROBLEM — R23.2 HOT FLASHES: Status: ACTIVE | Noted: 2020-11-30

## 2024-03-21 PROBLEM — R19.00 PELVIC FULLNESS IN FEMALE: Status: ACTIVE | Noted: 2020-11-30

## 2024-03-21 PROBLEM — R10.2 PELVIC PAIN: Status: ACTIVE | Noted: 2020-12-02

## 2024-03-21 PROBLEM — M79.672 PAIN IN LEFT FOOT: Status: ACTIVE | Noted: 2024-03-21

## 2024-03-21 ASSESSMENT — PATIENT HEALTH QUESTIONNAIRE - PHQ9
2. FEELING DOWN, DEPRESSED OR HOPELESS: MORE THAN HALF THE DAYS
9. THOUGHTS THAT YOU WOULD BE BETTER OFF DEAD, OR OF HURTING YOURSELF: NOT AT ALL
10. IF YOU CHECKED OFF ANY PROBLEMS, HOW DIFFICULT HAVE THESE PROBLEMS MADE IT FOR YOU TO DO YOUR WORK, TAKE CARE OF THINGS AT HOME, OR GET ALONG WITH OTHER PEOPLE: SOMEWHAT DIFFICULT
3. TROUBLE FALLING OR STAYING ASLEEP: NEARLY EVERY DAY
SUM OF ALL RESPONSES TO PHQ QUESTIONS 1-9: 15
4. FEELING TIRED OR HAVING LITTLE ENERGY: NEARLY EVERY DAY
3. TROUBLE FALLING OR STAYING ASLEEP: NEARLY EVERY DAY
SUM OF ALL RESPONSES TO PHQ QUESTIONS 1-9: 15
SUM OF ALL RESPONSES TO PHQ QUESTIONS 1-9: 15
6. FEELING BAD ABOUT YOURSELF - OR THAT YOU ARE A FAILURE OR HAVE LET YOURSELF OR YOUR FAMILY DOWN: SEVERAL DAYS
9. THOUGHTS THAT YOU WOULD BE BETTER OFF DEAD, OR OF HURTING YOURSELF: NOT AT ALL
8. MOVING OR SPEAKING SO SLOWLY THAT OTHER PEOPLE COULD HAVE NOTICED. OR THE OPPOSITE - BEING SO FIDGETY OR RESTLESS THAT YOU HAVE BEEN MOVING AROUND A LOT MORE THAN USUAL: NOT AT ALL
5. POOR APPETITE OR OVEREATING: NEARLY EVERY DAY
SUM OF ALL RESPONSES TO PHQ QUESTIONS 1-9: 15
SUM OF ALL RESPONSES TO PHQ9 QUESTIONS 1 & 2: 4
SUM OF ALL RESPONSES TO PHQ QUESTIONS 1-9: 15
10. IF YOU CHECKED OFF ANY PROBLEMS, HOW DIFFICULT HAVE THESE PROBLEMS MADE IT FOR YOU TO DO YOUR WORK, TAKE CARE OF THINGS AT HOME, OR GET ALONG WITH OTHER PEOPLE: SOMEWHAT DIFFICULT
1. LITTLE INTEREST OR PLEASURE IN DOING THINGS: MORE THAN HALF THE DAYS
1. LITTLE INTEREST OR PLEASURE IN DOING THINGS: MORE THAN HALF THE DAYS
5. POOR APPETITE OR OVEREATING: NEARLY EVERY DAY
4. FEELING TIRED OR HAVING LITTLE ENERGY: NEARLY EVERY DAY
8. MOVING OR SPEAKING SO SLOWLY THAT OTHER PEOPLE COULD HAVE NOTICED. OR THE OPPOSITE, BEING SO FIGETY OR RESTLESS THAT YOU HAVE BEEN MOVING AROUND A LOT MORE THAN USUAL: NOT AT ALL
2. FEELING DOWN, DEPRESSED OR HOPELESS: MORE THAN HALF THE DAYS
6. FEELING BAD ABOUT YOURSELF - OR THAT YOU ARE A FAILURE OR HAVE LET YOURSELF OR YOUR FAMILY DOWN: SEVERAL DAYS
7. TROUBLE CONCENTRATING ON THINGS, SUCH AS READING THE NEWSPAPER OR WATCHING TELEVISION: SEVERAL DAYS
7. TROUBLE CONCENTRATING ON THINGS, SUCH AS READING THE NEWSPAPER OR WATCHING TELEVISION: SEVERAL DAYS

## 2024-03-22 ENCOUNTER — OFFICE VISIT (OUTPATIENT)
Dept: FAMILY MEDICINE CLINIC | Age: 44
End: 2024-03-22
Payer: COMMERCIAL

## 2024-03-22 VITALS
SYSTOLIC BLOOD PRESSURE: 124 MMHG | DIASTOLIC BLOOD PRESSURE: 82 MMHG | BODY MASS INDEX: 36.73 KG/M2 | OXYGEN SATURATION: 99 % | HEART RATE: 79 BPM | WEIGHT: 199.6 LBS | HEIGHT: 62 IN

## 2024-03-22 DIAGNOSIS — N95.1 VASOMOTOR SYMPTOMS DUE TO MENOPAUSE: ICD-10-CM

## 2024-03-22 DIAGNOSIS — F32.A DEPRESSION, UNSPECIFIED DEPRESSION TYPE: ICD-10-CM

## 2024-03-22 DIAGNOSIS — F41.9 ANXIETY: Primary | ICD-10-CM

## 2024-03-22 DIAGNOSIS — Z12.11 SCREENING FOR COLON CANCER: ICD-10-CM

## 2024-03-22 PROCEDURE — 99214 OFFICE O/P EST MOD 30 MIN: CPT | Performed by: NURSE PRACTITIONER

## 2024-03-22 RX ORDER — BUSPIRONE HYDROCHLORIDE 5 MG/1
TABLET ORAL
COMMUNITY
Start: 2024-03-21

## 2024-03-22 RX ORDER — FAMOTIDINE 20 MG/1
1 TABLET, FILM COATED ORAL NIGHTLY PRN
COMMUNITY

## 2024-03-22 RX ORDER — DULOXETIN HYDROCHLORIDE 30 MG/1
30 CAPSULE, DELAYED RELEASE ORAL DAILY
Qty: 14 CAPSULE | Refills: 0 | Status: SHIPPED | OUTPATIENT
Start: 2024-03-22 | End: 2024-04-05

## 2024-03-22 RX ORDER — VENLAFAXINE HYDROCHLORIDE 37.5 MG/1
37.5 CAPSULE, EXTENDED RELEASE ORAL DAILY
Qty: 30 CAPSULE | Refills: 3 | Status: SHIPPED | OUTPATIENT
Start: 2024-04-05

## 2024-03-22 RX ORDER — SERTRALINE HYDROCHLORIDE 25 MG/1
25 TABLET, FILM COATED ORAL DAILY
Qty: 30 TABLET | Refills: 3 | Status: SHIPPED | OUTPATIENT
Start: 2024-04-05 | End: 2024-03-22 | Stop reason: ALTCHOICE

## 2024-03-22 SDOH — ECONOMIC STABILITY: FOOD INSECURITY: WITHIN THE PAST 12 MONTHS, YOU WORRIED THAT YOUR FOOD WOULD RUN OUT BEFORE YOU GOT MONEY TO BUY MORE.: NEVER TRUE

## 2024-03-22 SDOH — ECONOMIC STABILITY: FOOD INSECURITY: WITHIN THE PAST 12 MONTHS, THE FOOD YOU BOUGHT JUST DIDN'T LAST AND YOU DIDN'T HAVE MONEY TO GET MORE.: NEVER TRUE

## 2024-03-22 SDOH — ECONOMIC STABILITY: INCOME INSECURITY: HOW HARD IS IT FOR YOU TO PAY FOR THE VERY BASICS LIKE FOOD, HOUSING, MEDICAL CARE, AND HEATING?: NOT HARD AT ALL

## 2024-03-22 ASSESSMENT — ENCOUNTER SYMPTOMS
VOMITING: 0
DIARRHEA: 0
CONSTIPATION: 0
BLOOD IN STOOL: 0
COUGH: 0
ABDOMINAL PAIN: 0
CHEST TIGHTNESS: 0
ABDOMINAL DISTENTION: 0
SHORTNESS OF BREATH: 0
WHEEZING: 0
BACK PAIN: 0
NAUSEA: 0

## 2024-03-22 NOTE — PROGRESS NOTES
Visit Information    Have you changed or started any medications since your last visit including any over-the-counter medicines, vitamins, or herbal medicines? no   Have you stopped taking any of your medications? Is so, why? -  no  Are you having any side effects from any of your medications? - no    Have you seen any other physician or provider since your last visit?  no   Have you had any other diagnostic tests since your last visit?  no   Have you been seen in the emergency room and/or had an admission in a hospital since we last saw you?  no   Have you had your routine dental cleaning in the past 6 months?  yes -      Do you have an active MyChart account? If no, what is the barrier?  Yes    Patient Care Team:  Rafal Anne APRN - CNP as PCP - General (Nurse Practitioner)  Rafal Anne APRN - CNP as PCP - Empaneled Provider  Hans Bautista MD as Consulting Physician (Gastroenterology)    Medical History Review  Past Medical, Family, and Social History reviewed and does contribute to the patient presenting condition    Health Maintenance   Topic Date Due    Colorectal Cancer Screen  Never done    DTaP/Tdap/Td vaccine (1 - Tdap) 08/22/2024 (Originally 12/20/1997)    COVID-19 Vaccine (2 - 2023-24 season) 11/01/2024 (Originally 9/1/2023)    Depression Monitoring  03/21/2025    Cervical cancer screen  12/16/2027    Lipids  08/25/2028    Flu vaccine  Completed    Hepatitis C screen  Completed    HIV screen  Completed    Hepatitis A vaccine  Aged Out    Hib vaccine  Aged Out    HPV vaccine  Aged Out    Polio vaccine  Aged Out    Meningococcal (ACWY) vaccine  Aged Out    Pneumococcal 0-64 years Vaccine  Aged Out    Hepatitis B vaccine  Discontinued

## 2024-03-22 NOTE — PROGRESS NOTES
Sheyla Terrazas (:  1980) is a 43 y.o. female,Established patient, here for evaluation of the following chief complaint(s): Discuss Medications and Blood Pressure Check      ASSESSMENT/PLAN:    Sheyla received counseling on the following healthy behaviors: nutrition, exercise, and medication adherence  Reviewed prior labs and health maintenance  Discussed use, benefit, and side effects of prescribed medications.   Barriers to medication compliance addressed.   Patient given educational materials - see patient instructions  All patient questions answered.  Patient voiced understanding.  The patient's past medical,surgical, social, and family history as well as her current medications and allergies were reviewed as documented in today's encounter.    Medications, labs, diagnostic studies, consultations and follow-up as documented in this encounter.    Sheyla was seen today for discuss medications and blood pressure check.    Diagnoses and all orders for this visit:    Anxiety  -Not well-controlled  -Plan to switch patient from Cymbalta to Effexor  -Plan to wean off Cymbalta  -Continue following with behavioral health  -See note below  -     DULoxetine (CYMBALTA) 30 MG extended release capsule; Take 1 capsule by mouth daily for 14 days  -     venlafaxine (EFFEXOR XR) 37.5 MG extended release capsule; Take 1 capsule by mouth daily    Depression, unspecified depression type  -Improved per patient  -States that the depression is more so related to her uncontrolled anxiety  -Plan for medication adjustment  -See note below  -     DULoxetine (CYMBALTA) 30 MG extended release capsule; Take 1 capsule by mouth daily for 14 days  -     venlafaxine (EFFEXOR XR) 37.5 MG extended release capsule; Take 1 capsule by mouth daily    Screening for colon cancer  -Family history of colon cancer, grandmother  -     Jayesh Brasher MD, Gastroenterology, [place]    Vasomotor symptoms due to menopause  -Not

## 2024-04-09 ENCOUNTER — PATIENT MESSAGE (OUTPATIENT)
Dept: FAMILY MEDICINE CLINIC | Age: 44
End: 2024-04-09

## 2024-04-09 NOTE — TELEPHONE ENCOUNTER
From: Sheyla Terrazas  To: Rafal Anne  Sent: 4/9/2024 8:48 AM EDT  Subject: Cymbalta Taper    Winston Lyles.   Just wanted to check in and let you know I did a week of alternating Cymbalta 60mg and 30mg every other day. I haven’t had any side effects so I’m going to do 30mg daily this week then start the Effexor once a day this weekend.

## 2024-04-18 ENCOUNTER — PATIENT MESSAGE (OUTPATIENT)
Dept: FAMILY MEDICINE CLINIC | Age: 44
End: 2024-04-18

## 2024-04-18 DIAGNOSIS — F41.9 ANXIETY: ICD-10-CM

## 2024-04-18 DIAGNOSIS — N95.1 VASOMOTOR SYMPTOMS DUE TO MENOPAUSE: ICD-10-CM

## 2024-04-18 DIAGNOSIS — F32.A DEPRESSION, UNSPECIFIED DEPRESSION TYPE: ICD-10-CM

## 2024-04-19 RX ORDER — VENLAFAXINE HYDROCHLORIDE 75 MG/1
75 CAPSULE, EXTENDED RELEASE ORAL DAILY
Qty: 30 CAPSULE | Refills: 2 | Status: SHIPPED | OUTPATIENT
Start: 2024-04-19 | End: 2024-07-18

## 2024-04-26 DIAGNOSIS — F41.9 ANXIETY: Primary | ICD-10-CM

## 2024-04-26 RX ORDER — BUSPIRONE HYDROCHLORIDE 5 MG/1
TABLET ORAL
Qty: 180 TABLET | Refills: 3 | Status: SHIPPED | OUTPATIENT
Start: 2024-04-26

## 2024-04-26 NOTE — TELEPHONE ENCOUNTER
Please Approve or Refuse.  Send to Pharmacy per Pt's Request:      Next Visit Date:  9/20/2024   Last Visit Date: 3/22/2024    No results found for: \"LABA1C\"          ( goal A1C is < 7)   BP Readings from Last 3 Encounters:   03/22/24 124/82   12/29/23 (!) 161/94   11/26/23 (!) 161/93          (goal 120/80)  BUN   Date Value Ref Range Status   12/15/2023 15 6 - 20 mg/dL Final     Creatinine   Date Value Ref Range Status   12/15/2023 0.8 0.5 - 0.9 mg/dL Final     Potassium   Date Value Ref Range Status   12/15/2023 4.9 3.7 - 5.3 mmol/L Final

## 2024-05-07 ENCOUNTER — TELEPHONE (OUTPATIENT)
Dept: GASTROENTEROLOGY | Age: 44
End: 2024-05-07

## 2024-05-08 ENCOUNTER — PATIENT MESSAGE (OUTPATIENT)
Dept: FAMILY MEDICINE CLINIC | Age: 44
End: 2024-05-08

## 2024-05-08 DIAGNOSIS — F32.A DEPRESSION, UNSPECIFIED DEPRESSION TYPE: Primary | ICD-10-CM

## 2024-05-08 RX ORDER — VENLAFAXINE HYDROCHLORIDE 37.5 MG/1
37.5 CAPSULE, EXTENDED RELEASE ORAL NIGHTLY
Qty: 30 CAPSULE | Refills: 2 | Status: SHIPPED | OUTPATIENT
Start: 2024-05-08 | End: 2024-08-06

## 2024-05-08 NOTE — TELEPHONE ENCOUNTER
From: Sheyla Terrazas  To: Rafal Anne  Sent: 5/8/2024 7:47 AM EDT  Subject: Venlafaxine Dose    Good morning.   First I want to thank you for being so quick to respond to your messages. I can’t tell you how impressed I continue to be with your care. I’ve recommended you to several people looking for a provider.   Secondly, I think I may have finally found a good dose of the venlafaxine. I took the 75mg daily for 10 days then added one 37.5mg to it and I can finally say my sleep is so much better and I’ve been feeling better too. If you’re in agreement will you send an order for the 37.5 caps in?   Thank you.

## 2024-05-16 ENCOUNTER — PATIENT MESSAGE (OUTPATIENT)
Dept: FAMILY MEDICINE CLINIC | Age: 44
End: 2024-05-16

## 2024-05-16 DIAGNOSIS — Z12.11 SCREENING FOR COLON CANCER: Primary | ICD-10-CM

## 2024-05-17 NOTE — TELEPHONE ENCOUNTER
Avs discussed with Krystina Montaño by Discharge Nurse Clair Del Valle LPN. Parent verbalized understanding and has no further questions.  AVS printed and given to patient Clair Del Valle LPN From: Sheyla Terrazas  To: Rafal Anne  Sent: 5/16/2024 3:55 PM EDT  Subject: Colonoscopy Order    Winston Lyles.     I have decided to see Dr. Martin Coughlin for the colonoscopy. They do not do MyChart so they cannot see the order you put in. Are you able to send me an actual order that I can then forward to them?    Thank you.

## 2024-06-12 DIAGNOSIS — F41.9 ANXIETY: ICD-10-CM

## 2024-06-12 RX ORDER — BUSPIRONE HYDROCHLORIDE 5 MG/1
TABLET ORAL
Qty: 180 TABLET | Refills: 3 | Status: SHIPPED | OUTPATIENT
Start: 2024-06-12

## 2024-06-12 NOTE — TELEPHONE ENCOUNTER
Please Approve or Refuse.  Send to Pharmacy per Pt's Request: [facility]     Next Visit Date:  9/20/2024   Last Visit Date: 3/22/2024    No results found for: \"LABA1C\"          ( goal A1C is < 7)   BP Readings from Last 3 Encounters:   03/22/24 124/82   12/29/23 (!) 161/94   11/26/23 (!) 161/93          (goal 120/80)  BUN   Date Value Ref Range Status   12/15/2023 15 6 - 20 mg/dL Final     Creatinine   Date Value Ref Range Status   12/15/2023 0.8 0.5 - 0.9 mg/dL Final     Potassium   Date Value Ref Range Status   12/15/2023 4.9 3.7 - 5.3 mmol/L Final

## 2024-07-10 ENCOUNTER — TELEPHONE (OUTPATIENT)
Dept: FAMILY MEDICINE CLINIC | Age: 44
End: 2024-07-10

## 2024-07-10 NOTE — TELEPHONE ENCOUNTER
It looks like there is an order already in place, can we please reach out to patient to see if we can help schedule.

## 2024-07-10 NOTE — TELEPHONE ENCOUNTER
Received fax from [facility] that patient is over due for diagnostic mammogram  as of 04/17/24.

## 2024-07-16 DIAGNOSIS — F41.9 ANXIETY: ICD-10-CM

## 2024-07-16 DIAGNOSIS — N95.1 VASOMOTOR SYMPTOMS DUE TO MENOPAUSE: ICD-10-CM

## 2024-07-16 DIAGNOSIS — F32.A DEPRESSION, UNSPECIFIED DEPRESSION TYPE: ICD-10-CM

## 2024-07-16 RX ORDER — VENLAFAXINE HYDROCHLORIDE 75 MG/1
75 CAPSULE, EXTENDED RELEASE ORAL DAILY
Qty: 30 CAPSULE | Refills: 2 | Status: SHIPPED | OUTPATIENT
Start: 2024-07-16

## 2024-07-16 NOTE — TELEPHONE ENCOUNTER
Please Approve or Refuse.  Send to Pharmacy per Pt's Request: [facility]      Next Visit Date:  9/20/2024   Last Visit Date: 3/22/2024    No results found for: \"LABA1C\"          ( goal A1C is < 7)   BP Readings from Last 3 Encounters:   03/22/24 124/82   12/29/23 (!) 161/94   11/26/23 (!) 161/93          (goal 120/80)  BUN   Date Value Ref Range Status   12/15/2023 15 6 - 20 mg/dL Final     Creatinine   Date Value Ref Range Status   12/15/2023 0.8 0.5 - 0.9 mg/dL Final     Potassium   Date Value Ref Range Status   12/15/2023 4.9 3.7 - 5.3 mmol/L Final

## 2024-08-14 RX ORDER — LISINOPRIL 10 MG/1
10 TABLET ORAL DAILY
Qty: 90 TABLET | Refills: 1 | Status: SHIPPED | OUTPATIENT
Start: 2024-08-14

## 2024-09-20 ENCOUNTER — OFFICE VISIT (OUTPATIENT)
Dept: FAMILY MEDICINE CLINIC | Age: 44
End: 2024-09-20
Payer: COMMERCIAL

## 2024-09-20 VITALS
OXYGEN SATURATION: 98 % | HEART RATE: 80 BPM | DIASTOLIC BLOOD PRESSURE: 92 MMHG | SYSTOLIC BLOOD PRESSURE: 148 MMHG | TEMPERATURE: 97.6 F

## 2024-09-20 DIAGNOSIS — R53.83 OTHER FATIGUE: ICD-10-CM

## 2024-09-20 DIAGNOSIS — F32.A DEPRESSION, UNSPECIFIED DEPRESSION TYPE: ICD-10-CM

## 2024-09-20 DIAGNOSIS — K59.00 CONSTIPATION, UNSPECIFIED CONSTIPATION TYPE: ICD-10-CM

## 2024-09-20 DIAGNOSIS — I10 HYPERTENSION, UNSPECIFIED TYPE: ICD-10-CM

## 2024-09-20 DIAGNOSIS — F41.9 ANXIETY: Primary | ICD-10-CM

## 2024-09-20 DIAGNOSIS — R19.7 DIARRHEA, UNSPECIFIED TYPE: ICD-10-CM

## 2024-09-20 DIAGNOSIS — Z13.220 SCREENING FOR HYPERLIPIDEMIA: ICD-10-CM

## 2024-09-20 PROCEDURE — 3080F DIAST BP >= 90 MM HG: CPT | Performed by: NURSE PRACTITIONER

## 2024-09-20 PROCEDURE — 3077F SYST BP >= 140 MM HG: CPT | Performed by: NURSE PRACTITIONER

## 2024-09-20 PROCEDURE — 99214 OFFICE O/P EST MOD 30 MIN: CPT | Performed by: NURSE PRACTITIONER

## 2024-09-20 RX ORDER — LISINOPRIL 20 MG/1
20 TABLET ORAL DAILY
Qty: 30 TABLET | Refills: 2 | Status: SHIPPED | OUTPATIENT
Start: 2024-09-20 | End: 2024-12-19

## 2024-09-20 ASSESSMENT — ENCOUNTER SYMPTOMS
CHEST TIGHTNESS: 0
WHEEZING: 0
DIARRHEA: 1
SHORTNESS OF BREATH: 0
CONSTIPATION: 1
COUGH: 0
ABDOMINAL DISTENTION: 0
BACK PAIN: 0
VOMITING: 0
NAUSEA: 0
ABDOMINAL PAIN: 0
BLOOD IN STOOL: 0

## 2024-09-23 ENCOUNTER — HOSPITAL ENCOUNTER (OUTPATIENT)
Age: 44
Setting detail: SPECIMEN
Discharge: HOME | End: 2024-09-23

## 2024-09-23 DIAGNOSIS — K59.00 CONSTIPATION, UNSPECIFIED CONSTIPATION TYPE: ICD-10-CM

## 2024-09-23 DIAGNOSIS — Z13.220 SCREENING FOR HYPERLIPIDEMIA: ICD-10-CM

## 2024-09-23 DIAGNOSIS — R19.7 DIARRHEA, UNSPECIFIED TYPE: ICD-10-CM

## 2024-09-23 DIAGNOSIS — R53.83 OTHER FATIGUE: ICD-10-CM

## 2024-09-23 LAB
25(OH)D3 SERPL-MCNC: 34.2 NG/ML (ref 30–100)
ALBUMIN SERPL-MCNC: 4.4 G/DL (ref 3.5–5.2)
ALBUMIN/GLOB SERPL: 2 {RATIO} (ref 1–2.5)
ALP SERPL-CCNC: 73 U/L (ref 35–104)
ALT SERPL-CCNC: 14 U/L (ref 10–35)
ANION GAP SERPL CALCULATED.3IONS-SCNC: 10 MMOL/L (ref 9–16)
AST SERPL-CCNC: 26 U/L (ref 10–35)
BILIRUB SERPL-MCNC: 0.4 MG/DL (ref 0–1.2)
BUN SERPL-MCNC: 14 MG/DL (ref 6–20)
CALCIUM SERPL-MCNC: 9.3 MG/DL (ref 8.6–10.4)
CHLORIDE SERPL-SCNC: 103 MMOL/L (ref 98–107)
CHOLEST SERPL-MCNC: 162 MG/DL (ref 0–199)
CHOLESTEROL/HDL RATIO: 3
CO2 SERPL-SCNC: 23 MMOL/L (ref 20–31)
CREAT SERPL-MCNC: 0.8 MG/DL (ref 0.5–0.9)
ERYTHROCYTE [DISTWIDTH] IN BLOOD BY AUTOMATED COUNT: 13.4 % (ref 11.8–14.4)
FOLATE SERPL-MCNC: 16.6 NG/ML (ref 4.8–24.2)
GFR, ESTIMATED: 89 ML/MIN/1.73M2
GLUCOSE SERPL-MCNC: 81 MG/DL (ref 74–99)
HCT VFR BLD AUTO: 41 % (ref 36.3–47.1)
HDLC SERPL-MCNC: 56 MG/DL
HGB BLD-MCNC: 13.2 G/DL (ref 11.9–15.1)
LDLC SERPL CALC-MCNC: 90 MG/DL (ref 0–100)
MAGNESIUM SERPL-MCNC: 1.9 MG/DL (ref 1.6–2.6)
MCH RBC QN AUTO: 29.2 PG (ref 25.2–33.5)
MCHC RBC AUTO-ENTMCNC: 32.2 G/DL (ref 28.4–34.8)
MCV RBC AUTO: 90.7 FL (ref 82.6–102.9)
NRBC BLD-RTO: 0 PER 100 WBC
PLATELET # BLD AUTO: 270 K/UL (ref 138–453)
PMV BLD AUTO: 10.5 FL (ref 8.1–13.5)
POTASSIUM SERPL-SCNC: 4.4 MMOL/L (ref 3.7–5.3)
PROT SERPL-MCNC: 6.7 G/DL (ref 6.6–8.7)
RBC # BLD AUTO: 4.52 M/UL (ref 3.95–5.11)
SODIUM SERPL-SCNC: 136 MMOL/L (ref 136–145)
TRIGL SERPL-MCNC: 80 MG/DL
TSH SERPL DL<=0.05 MIU/L-ACNC: 0.58 UIU/ML (ref 0.27–4.2)
VIT B12 SERPL-MCNC: 822 PG/ML (ref 232–1245)
VLDLC SERPL CALC-MCNC: 16 MG/DL
WBC OTHER # BLD: 7.4 K/UL (ref 3.5–11.3)

## 2024-09-24 LAB
BANANA IGE QN: <0.1 KU/L (ref 0–0.34)
CORN IGE QN: <0.1 KU/L (ref 0–0.34)
COW MILK IGE QN: <0.1 KU/L (ref 0–0.34)
EGG YOLK IGE QN: <0.1 KU/L (ref 0–0.34)
GLIADIN IGA SER IA-ACNC: 0.3 U/ML
GLIADIN IGG SER IA-ACNC: <0.4 U/ML
GLUTEN IGE QN: <0.1 KU/L (ref 0–0.34)
IGA SERPL-MCNC: 109 MG/DL (ref 70–400)
PEANUT IGE QN: <0.1 KU/L (ref 0–0.34)
SOYBEAN IGE QN: <0.1 KU/L (ref 0–0.34)
STRAWBERRY IGE QN: <0.1 KU/L (ref 0–0.34)
TTG IGA SER IA-ACNC: <0.1 U/ML
WHEAT IGE QN: <0.1 KU/L (ref 0–0.34)
WHOLE EGG IGE QN: <0.1 KU/L (ref 0–0.34)

## 2024-09-27 ENCOUNTER — NURSE ONLY (OUTPATIENT)
Dept: FAMILY MEDICINE CLINIC | Age: 44
End: 2024-09-27
Payer: COMMERCIAL

## 2024-09-27 VITALS — HEART RATE: 88 BPM | OXYGEN SATURATION: 98 % | DIASTOLIC BLOOD PRESSURE: 90 MMHG | SYSTOLIC BLOOD PRESSURE: 136 MMHG

## 2024-09-27 DIAGNOSIS — I10 HYPERTENSION, UNSPECIFIED TYPE: Primary | ICD-10-CM

## 2024-09-27 PROCEDURE — 99212 OFFICE O/P EST SF 10 MIN: CPT | Performed by: NURSE PRACTITIONER

## 2024-09-27 PROCEDURE — 3080F DIAST BP >= 90 MM HG: CPT | Performed by: NURSE PRACTITIONER

## 2024-09-27 PROCEDURE — 3075F SYST BP GE 130 - 139MM HG: CPT | Performed by: NURSE PRACTITIONER

## 2024-10-07 ENCOUNTER — NURSE ONLY (OUTPATIENT)
Dept: FAMILY MEDICINE CLINIC | Age: 44
End: 2024-10-07
Payer: COMMERCIAL

## 2024-10-07 VITALS — HEART RATE: 85 BPM | OXYGEN SATURATION: 99 % | SYSTOLIC BLOOD PRESSURE: 136 MMHG | DIASTOLIC BLOOD PRESSURE: 78 MMHG

## 2024-10-07 DIAGNOSIS — I10 HYPERTENSION, UNSPECIFIED TYPE: ICD-10-CM

## 2024-10-07 PROCEDURE — 3075F SYST BP GE 130 - 139MM HG: CPT | Performed by: NURSE PRACTITIONER

## 2024-10-07 PROCEDURE — 99212 OFFICE O/P EST SF 10 MIN: CPT | Performed by: NURSE PRACTITIONER

## 2024-10-07 PROCEDURE — 3078F DIAST BP <80 MM HG: CPT | Performed by: NURSE PRACTITIONER

## 2024-10-07 RX ORDER — LISINOPRIL 30 MG/1
30 TABLET ORAL DAILY
Qty: 90 TABLET | Refills: 0 | Status: SHIPPED | OUTPATIENT
Start: 2024-10-07 | End: 2025-01-05

## 2024-10-07 NOTE — PROGRESS NOTES
Sheyla Terrazas is being seen today for a Blood Pressure Recheck.     Sheyla Terrazas was seen 9/20/2024 and her Blood Pressure was:   BP Readings from Last 1 Encounters:   10/07/24 136/78     PATIENT WOULD LIKE TO KNOW IF SHE SHOULD CONTINUE TO CHECK BP BID OR JUST QD/ALSO CONTINUE TO RECORD? PLEASE ADVISE      Cami Crawford MA

## 2024-10-07 NOTE — PROGRESS NOTES
Blood pressure is improved, plan to increase dose of lisinopril to 30 mg daily.  Have her return to the office in 2 weeks for blood pressure check.  Continue checking at home.    Vitals:    10/07/24 0856   BP: 136/78   Pulse: 85   SpO2: 99%     Electronically signed by MARIE Clark CNP on 10/7/2024 at 9:21 AM

## 2024-10-15 DIAGNOSIS — N95.1 VASOMOTOR SYMPTOMS DUE TO MENOPAUSE: ICD-10-CM

## 2024-10-15 DIAGNOSIS — F32.A DEPRESSION, UNSPECIFIED DEPRESSION TYPE: ICD-10-CM

## 2024-10-15 DIAGNOSIS — F41.9 ANXIETY: ICD-10-CM

## 2024-10-15 RX ORDER — VENLAFAXINE HYDROCHLORIDE 75 MG/1
75 CAPSULE, EXTENDED RELEASE ORAL DAILY
Qty: 30 CAPSULE | Refills: 2 | Status: SHIPPED | OUTPATIENT
Start: 2024-10-15

## 2024-10-15 NOTE — TELEPHONE ENCOUNTER
Please Approve or Refuse.  Send to Pharmacy per Pt's Request: [facility]      Next Visit Date:  10/21/2024   Last Visit Date: 9/20/2024    No results found for: \"LABA1C\"          ( goal A1C is < 7)   BP Readings from Last 3 Encounters:   10/07/24 136/78   09/27/24 (!) 136/90   09/20/24 (!) 148/92          (goal 120/80)  BUN   Date Value Ref Range Status   09/23/2024 14 6 - 20 mg/dL Final     Creatinine   Date Value Ref Range Status   09/23/2024 0.8 0.50 - 0.90 mg/dL Final     Potassium   Date Value Ref Range Status   09/23/2024 4.4 3.7 - 5.3 mmol/L Final

## 2024-10-23 ENCOUNTER — NURSE ONLY (OUTPATIENT)
Dept: FAMILY MEDICINE CLINIC | Age: 44
End: 2024-10-23
Payer: COMMERCIAL

## 2024-10-23 VITALS — SYSTOLIC BLOOD PRESSURE: 126 MMHG | HEART RATE: 91 BPM | DIASTOLIC BLOOD PRESSURE: 88 MMHG | OXYGEN SATURATION: 99 %

## 2024-10-23 DIAGNOSIS — R03.0 ELEVATED BP WITHOUT DIAGNOSIS OF HYPERTENSION: Primary | ICD-10-CM

## 2024-10-23 PROCEDURE — 99212 OFFICE O/P EST SF 10 MIN: CPT | Performed by: NURSE PRACTITIONER

## 2024-10-23 NOTE — PROGRESS NOTES
Chief Complaint   Patient presents with    Blood Pressure Check      Sheyla Terrazas is here for BP check    BP Readings from Last 3 Encounters:   10/23/24 126/88   10/07/24 136/78   09/27/24 (!) 136/90       BP is 126/88      Future Appointments   Date Time Provider Department Center   11/6/2024  1:00 PM Mo Siddiqi, DO [facility]   1/10/2025 10:00 AM Irais Fallon APRN - CNP [facility]   1/24/2025 10:30 AM Rafla Anne APRN - CNP fp sc [facility]

## 2024-10-23 NOTE — PROGRESS NOTES
Vitals:    10/23/24 1312   BP: 126/88   Pulse: 91   SpO2: 99%   In review of patient's blood pressure log, it looks a gets pretty well-controlled.  Always little bit higher in the evening, which may be secondary to multiple factors throughout the day.  Blood pressure looks good today in the office would like to continue current regimen for now.  Have her follow-up as scheduled.    Electronically signed by MARIE Clark CNP on 10/23/2024 at 1:17 PM

## 2024-11-03 SDOH — HEALTH STABILITY: PHYSICAL HEALTH
ON AVERAGE, HOW MANY DAYS PER WEEK DO YOU ENGAGE IN MODERATE TO STRENUOUS EXERCISE (LIKE A BRISK WALK)?: PATIENT DECLINED

## 2024-11-04 DIAGNOSIS — M25.561 PAIN IN BOTH KNEES, UNSPECIFIED CHRONICITY: Primary | ICD-10-CM

## 2024-11-04 DIAGNOSIS — M25.562 PAIN IN BOTH KNEES, UNSPECIFIED CHRONICITY: Primary | ICD-10-CM

## 2024-11-06 ENCOUNTER — HOSPITAL ENCOUNTER (OUTPATIENT)
Age: 44
Setting detail: SPECIMEN
Discharge: HOME | End: 2024-11-06

## 2024-11-06 ENCOUNTER — OFFICE VISIT (OUTPATIENT)
Dept: ORTHOPEDIC SURGERY | Age: 44
End: 2024-11-06

## 2024-11-06 VITALS — RESPIRATION RATE: 18 BRPM | OXYGEN SATURATION: 99 % | HEIGHT: 62 IN | BODY MASS INDEX: 36.51 KG/M2

## 2024-11-06 DIAGNOSIS — Z96.651 STATUS POST RIGHT PARTIAL KNEE REPLACEMENT: ICD-10-CM

## 2024-11-06 DIAGNOSIS — Z96.651 STATUS POST RIGHT PARTIAL KNEE REPLACEMENT: Primary | ICD-10-CM

## 2024-11-06 LAB
CRP SERPL HS-MCNC: <3 MG/L (ref 0–5)
ERYTHROCYTE [SEDIMENTATION RATE] IN BLOOD BY PHOTOMETRIC METHOD: 2 MM/HR (ref 0–20)

## 2024-11-06 NOTE — PATIENT INSTRUCTIONS
PATIENTIQ:  PatientIQ helps [facility] stay in touch with you to know how you're feeling, and provides education and care instructions to you at various time points.   Your answers help your care team track your progress to provide the best care possible. PatientIQ will contact you pre-op and post-op via email or text with:  Educational Videos and Care Instructions  Questionnaires About How You're Feeling    Your participation provides you valuable education and helps [facility] continue to provide quality care to all patients. Thank you

## 2024-11-06 NOTE — PROGRESS NOTES
[facility] ORTHOPEDICS AND SPORTS MEDICINE  [address]  [place] [address]  Dept: [phone]  Dept Fax: [phone]        Ambulatory Follow Up      Subjective:   Sheyla Terrazas is a 43 y.o. year old female who presents to our office today for routine followup regarding her No diagnosis found..    Chief Complaint   Patient presents with    Knee Pain     Bilateral knee pain       History of Present Illness  The patient is a 43-year-old female who presents to the office today for evaluation of bilateral knee pain. She underwent a right knee medial unicompartmental knee arthroplasty on 06/12/2018.    She began experiencing discomfort in her left knee approximately a year ago, which was initially managed with Aleve, taken twice daily. Recently, she has started experiencing pain in her right knee as well, accompanied by occasional numbness in her right leg. The numbness, which started about six months ago, is not a daily occurrence but is more noticeable at work due to prolonged standing. She describes the pain in both knees as being located medially. She also reports a sensation of instability in her left knee when standing on it to shave her right leg, and a feeling of her knee sliding forward when walking. She manages flare-ups with ice and elevation, and increases her Aleve dosage to two tablets when the pain becomes severe.     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 The pain is constant and disrupts her sleep, particularly when she changes position or moves her legs. She also reports feeling fatigued and has difficulty lifting her feet when walking, leading to tripping over her toes.    She is currently taking an antidepressant and lisinopril, and has been advised that she does not require a cardiology referral.      Review of Systems   Constitutional:  Positive for activity change.

## 2024-11-07 DIAGNOSIS — F32.A DEPRESSION, UNSPECIFIED DEPRESSION TYPE: ICD-10-CM

## 2024-11-07 RX ORDER — VENLAFAXINE HYDROCHLORIDE 37.5 MG/1
37.5 CAPSULE, EXTENDED RELEASE ORAL DAILY
Qty: 30 CAPSULE | Refills: 3 | Status: SHIPPED | OUTPATIENT
Start: 2024-11-07

## 2024-11-07 ASSESSMENT — ENCOUNTER SYMPTOMS
ABDOMINAL PAIN: 0
EYE DISCHARGE: 0
ROS SKIN COMMENTS: NEGATIVE FOR RASH
SHORTNESS OF BREATH: 0

## 2024-11-07 NOTE — TELEPHONE ENCOUNTER
Please Approve or Refuse.  Send to Pharmacy per Pt's Request:      Next Visit Date:  1/24/2025   Last Visit Date: 9/20/2024    No results found for: \"LABA1C\"          ( goal A1C is < 7)   BP Readings from Last 3 Encounters:   10/23/24 126/88   10/07/24 136/78   09/27/24 (!) 136/90          (goal 120/80)  BUN   Date Value Ref Range Status   09/23/2024 14 6 - 20 mg/dL Final     Creatinine   Date Value Ref Range Status   09/23/2024 0.8 0.50 - 0.90 mg/dL Final     Potassium   Date Value Ref Range Status   09/23/2024 4.4 3.7 - 5.3 mmol/L Final

## 2024-12-04 ENCOUNTER — PREP FOR PROCEDURE (OUTPATIENT)
Dept: ORTHOPEDIC SURGERY | Age: 44
End: 2024-12-04

## 2024-12-04 DIAGNOSIS — Z01.818 PRE-OP TESTING: Primary | ICD-10-CM

## 2024-12-04 DIAGNOSIS — M17.0 PRIMARY OSTEOARTHRITIS OF BOTH KNEES: ICD-10-CM

## 2024-12-31 DIAGNOSIS — I10 HYPERTENSION, UNSPECIFIED TYPE: ICD-10-CM

## 2024-12-31 RX ORDER — LISINOPRIL 30 MG/1
30 TABLET ORAL DAILY
Qty: 90 TABLET | Refills: 0 | Status: SHIPPED | OUTPATIENT
Start: 2024-12-31 | End: 2025-03-31

## 2024-12-31 NOTE — TELEPHONE ENCOUNTER
Please Approve or Refuse.  Send to Pharmacy per Pt's Request: [facility]      Next Visit Date:  2/7/2025   Last Visit Date: 9/20/2024    No results found for: \"LABA1C\"          ( goal A1C is < 7)   BP Readings from Last 3 Encounters:   10/23/24 126/88   10/07/24 136/78   09/27/24 (!) 136/90          (goal 120/80)  BUN   Date Value Ref Range Status   09/23/2024 14 6 - 20 mg/dL Final     Creatinine   Date Value Ref Range Status   09/23/2024 0.8 0.50 - 0.90 mg/dL Final     Potassium   Date Value Ref Range Status   09/23/2024 4.4 3.7 - 5.3 mmol/L Final

## 2025-01-10 ENCOUNTER — OFFICE VISIT (OUTPATIENT)
Dept: OBGYN CLINIC | Age: 45
End: 2025-01-10
Payer: COMMERCIAL

## 2025-01-10 VITALS
DIASTOLIC BLOOD PRESSURE: 96 MMHG | BODY MASS INDEX: 38.61 KG/M2 | SYSTOLIC BLOOD PRESSURE: 148 MMHG | HEIGHT: 62 IN | WEIGHT: 209.8 LBS

## 2025-01-10 DIAGNOSIS — Z12.31 ENCOUNTER FOR SCREENING MAMMOGRAM FOR BREAST CANCER: ICD-10-CM

## 2025-01-10 DIAGNOSIS — Z01.419 ENCOUNTER FOR GYNECOLOGICAL EXAMINATION: Primary | ICD-10-CM

## 2025-01-10 DIAGNOSIS — N92.6 IRREGULAR MENSTRUAL BLEEDING: ICD-10-CM

## 2025-01-10 PROCEDURE — 99396 PREV VISIT EST AGE 40-64: CPT | Performed by: NURSE PRACTITIONER

## 2025-01-10 ASSESSMENT — ENCOUNTER SYMPTOMS
SHORTNESS OF BREATH: 0
ALLERGIC/IMMUNOLOGIC NEGATIVE: 1
GASTROINTESTINAL NEGATIVE: 1
ABDOMINAL DISTENTION: 0
RESPIRATORY NEGATIVE: 1
BACK PAIN: 0
COUGH: 0

## 2025-01-10 NOTE — PROGRESS NOTES
Chaperone for Intimate Exam  Chaperone was offered as part of the rooming process. Patient declined and agrees to continue with exam without a chaperone.  Chaperone: NONE       
dosing reviewed.  - Colonoscopy screening reviewed.   - General diet and exercise reviewed.   - Routine health maintenance per patients PCP.    Return in about 1 year (around 1/10/2026) for annual exam.        Electronically signed by MARIE Parkinson CNP on 1/9/25 at 2:16 PM EST

## 2025-01-13 DIAGNOSIS — F32.A DEPRESSION, UNSPECIFIED DEPRESSION TYPE: ICD-10-CM

## 2025-01-13 DIAGNOSIS — F41.9 ANXIETY: ICD-10-CM

## 2025-01-13 DIAGNOSIS — N95.1 VASOMOTOR SYMPTOMS DUE TO MENOPAUSE: ICD-10-CM

## 2025-01-13 RX ORDER — VENLAFAXINE HYDROCHLORIDE 75 MG/1
75 CAPSULE, EXTENDED RELEASE ORAL DAILY
Qty: 30 CAPSULE | Refills: 2 | Status: SHIPPED | OUTPATIENT
Start: 2025-01-13

## 2025-01-13 NOTE — TELEPHONE ENCOUNTER
Please Approve or Refuse.  Send to Pharmacy per Pt's Request:      Next Visit Date:  2/7/2025   Last Visit Date: 9/20/2024    No results found for: \"LABA1C\"          ( goal A1C is < 7)   BP Readings from Last 3 Encounters:   01/10/25 (!) 148/96   10/23/24 126/88   10/07/24 136/78          (goal 120/80)  BUN   Date Value Ref Range Status   09/23/2024 14 6 - 20 mg/dL Final     Creatinine   Date Value Ref Range Status   09/23/2024 0.8 0.50 - 0.90 mg/dL Final     Potassium   Date Value Ref Range Status   09/23/2024 4.4 3.7 - 5.3 mmol/L Final

## 2025-01-22 ENCOUNTER — HOSPITAL ENCOUNTER (OUTPATIENT)
Age: 45
Setting detail: SPECIMEN
Discharge: HOME | End: 2025-01-22

## 2025-01-22 ENCOUNTER — HOSPITAL ENCOUNTER (OUTPATIENT)
Age: 45
Discharge: HOME | End: 2025-01-24
Payer: COMMERCIAL

## 2025-01-22 ENCOUNTER — HOSPITAL ENCOUNTER (OUTPATIENT)
Dept: PREADMISSION TESTING | Age: 45
Discharge: HOME | End: 2025-01-26
Payer: COMMERCIAL

## 2025-01-22 VITALS
BODY MASS INDEX: 35 KG/M2 | DIASTOLIC BLOOD PRESSURE: 93 MMHG | HEIGHT: 64 IN | OXYGEN SATURATION: 100 % | RESPIRATION RATE: 16 BRPM | WEIGHT: 205 LBS | SYSTOLIC BLOOD PRESSURE: 150 MMHG | TEMPERATURE: 97.3 F

## 2025-01-22 DIAGNOSIS — Z01.818 PRE-OP TESTING: ICD-10-CM

## 2025-01-22 LAB
ALBUMIN SERPL-MCNC: 4.4 G/DL (ref 3.5–5.2)
ALBUMIN/GLOB SERPL: 1.6 {RATIO} (ref 1–2.5)
ALP SERPL-CCNC: 94 U/L (ref 35–104)
ALT SERPL-CCNC: 13 U/L (ref 10–35)
ANION GAP SERPL CALCULATED.3IONS-SCNC: 11 MMOL/L (ref 9–16)
AST SERPL-CCNC: 22 U/L (ref 10–35)
BILIRUB SERPL-MCNC: 0.3 MG/DL (ref 0–1.2)
BILIRUB UR QL STRIP: NEGATIVE
BUN SERPL-MCNC: 24 MG/DL (ref 6–20)
CALCIUM SERPL-MCNC: 9.5 MG/DL (ref 8.6–10.4)
CHLORIDE SERPL-SCNC: 102 MMOL/L (ref 98–107)
CLARITY UR: CLEAR
CO2 SERPL-SCNC: 24 MMOL/L (ref 20–31)
COLOR UR: YELLOW
COMMENT: NORMAL
CREAT SERPL-MCNC: 0.8 MG/DL (ref 0.6–0.9)
ERYTHROCYTE [DISTWIDTH] IN BLOOD BY AUTOMATED COUNT: 12.5 % (ref 11.8–14.4)
EST. AVERAGE GLUCOSE BLD GHB EST-MCNC: 108 MG/DL
GFR, ESTIMATED: >90 ML/MIN/1.73M2
GLUCOSE SERPL-MCNC: 87 MG/DL (ref 74–99)
GLUCOSE UR STRIP-MCNC: NEGATIVE MG/DL
HBA1C MFR BLD: 5.4 % (ref 4–6)
HCT VFR BLD AUTO: 44.1 % (ref 36.3–47.1)
HGB BLD-MCNC: 14.6 G/DL (ref 11.9–15.1)
HGB UR QL STRIP.AUTO: NEGATIVE
KETONES UR STRIP-MCNC: NEGATIVE MG/DL
LEUKOCYTE ESTERASE UR QL STRIP: NEGATIVE
MCH RBC QN AUTO: 29.3 PG (ref 25.2–33.5)
MCHC RBC AUTO-ENTMCNC: 33.1 G/DL (ref 28.4–34.8)
MCV RBC AUTO: 88.4 FL (ref 82.6–102.9)
NITRITE UR QL STRIP: NEGATIVE
NRBC BLD-RTO: 0 PER 100 WBC
PH UR STRIP: 6 [PH] (ref 5–8)
PLATELET # BLD AUTO: 270 K/UL (ref 138–453)
PMV BLD AUTO: 10.2 FL (ref 8.1–13.5)
POTASSIUM SERPL-SCNC: 4.9 MMOL/L (ref 3.7–5.3)
PROT SERPL-MCNC: 7.1 G/DL (ref 6.6–8.7)
PROT UR STRIP-MCNC: NEGATIVE MG/DL
RBC # BLD AUTO: 4.99 M/UL (ref 3.95–5.11)
SODIUM SERPL-SCNC: 137 MMOL/L (ref 136–145)
SP GR UR STRIP: 1.02 (ref 1–1.03)
UROBILINOGEN UR STRIP-ACNC: NORMAL EU/DL (ref 0–1)
WBC OTHER # BLD: 6.9 K/UL (ref 3.5–11.3)

## 2025-01-22 PROCEDURE — 86900 BLOOD TYPING SEROLOGIC ABO: CPT

## 2025-01-22 PROCEDURE — 93005 ELECTROCARDIOGRAM TRACING: CPT

## 2025-01-22 PROCEDURE — 86901 BLOOD TYPING SEROLOGIC RH(D): CPT

## 2025-01-22 PROCEDURE — 83036 HEMOGLOBIN GLYCOSYLATED A1C: CPT

## 2025-01-22 PROCEDURE — 36415 COLL VENOUS BLD VENIPUNCTURE: CPT

## 2025-01-22 PROCEDURE — 71046 X-RAY EXAM CHEST 2 VIEWS: CPT

## 2025-01-22 PROCEDURE — 81003 URINALYSIS AUTO W/O SCOPE: CPT

## 2025-01-22 PROCEDURE — 85027 COMPLETE CBC AUTOMATED: CPT

## 2025-01-22 PROCEDURE — 80053 COMPREHEN METABOLIC PANEL: CPT

## 2025-01-22 PROCEDURE — 86850 RBC ANTIBODY SCREEN: CPT

## 2025-01-22 RX ORDER — GABAPENTIN 300 MG/1
300 CAPSULE ORAL ONCE
OUTPATIENT
Start: 2025-02-11

## 2025-01-22 RX ORDER — M-VIT,TX,IRON,MINS/CALC/FOLIC 27MG-0.4MG
1 TABLET ORAL DAILY
COMMUNITY

## 2025-01-22 RX ORDER — CEFAZOLIN SODIUM/WATER 2 G/20 ML
2000 SYRINGE (ML) INTRAVENOUS ONCE
OUTPATIENT
Start: 2025-02-11

## 2025-01-22 RX ORDER — CELECOXIB 200 MG/1
200 CAPSULE ORAL ONCE
OUTPATIENT
Start: 2025-02-11

## 2025-01-22 RX ORDER — NAPROXEN SODIUM 220 MG/1
220 TABLET, FILM COATED ORAL 2 TIMES DAILY WITH MEALS
COMMUNITY

## 2025-01-22 RX ORDER — ACETAMINOPHEN 500 MG
1000 TABLET ORAL ONCE
OUTPATIENT
Start: 2025-02-11

## 2025-01-22 NOTE — PRE-PROCEDURE INSTRUCTIONS
On the Day of Your Surgery, Tuesday, 2/11/25, Dr. Siddiqi's office will call and inform you of the arrival time the day of surgery.     Enter [facility] through the Main Entrance, take the lobby elevators to the second floor and check in at the Surgery Registration desk.     Continue to take your home medications as you normally do up to and including the night before surgery with the exception of blood thinning medications.    Blood Thinning Medications:  Please stop prescription blood thinning medications such as Apixaban (Eliquis); Clopidogrel (Plavix); Dabigatran (Pradaxa); Prasugrel (Effient); Rivaroxaban (Xarelto); Ticagrelor (Brilinta); Warfarin (Coumadin) only as directed by your surgeon and/or the prescribing physician    Some common examples of other medications that can thin your blood are: Aspirin, Ibuprofen (Advil, Motrin), Naproxen (Aleve), Meloxicam (Mobic), Celecoxib (Celebrex), Fish Oil, many Herbal Supplements.  These medications should usually be stopped at least 7 days prior to surgery.    Aleve, multivitamin.    Tylenol is OK to take for pain the week prior to surgery.    Failure to stop certain medications may interfere with your scheduled surgery.    If you receive instructions from your surgeon regarding what medications to stop prior to surgery, please follow those specific instructions.    Please take the following medication(s) the day of surgery with small sips of water:              Buspirone (if needed), venlafaxine, famotidine (if needed).    If you are on medicare and there is a possibility that you will be admitted following surgery:   Please bring your prescription medications in their original bottles with pharmacy label on the day of surgery.    Showering Before Surgery:     You can play an important role in your own health by carefully washing before surgery. Shower the night before and the morning of surgery using the instructions below. If you are allergic to

## 2025-01-22 NOTE — H&P
History and Physical Service   [facility]    HISTORY AND PHYSICAL EXAMINATION            Date of Evaluation: 1/22/2025  Patient name:  Sheyla Terrazas  MRN:   [id number]  YOB: 1980  PCP:    Rafal Anne APRN - CNP    History Obtained From:     Patient, medical records    History of Present Illness:     This is Sehyla Terrazas a 44 y.o. female who presents for a pre-admission testing appointment for an upcoming LEFT KNEE TOTAL ARTHROPLASTY, RIGHT KNEE TOTAL ARTHROPLASTY REVISION by Mo Siddiqi DO scheduled on 02/11/2025 at 0730 due to primary osteoarthritis of both knees. The patient's chief complaint is bilateral knee pain that has been ongoing for many years. Per the patient she has had chronic left knee pain for years but states in 2018 right knee pain was worse leading her to have a partial right knee replacement. Over the last six months the patient has noticed numbness/pain from the right knee down through her right foot that occurs intermittently mostly after being on her feet for prolonged periods of time. Knee pain is noticed most first thing in the morning when getting out of bed and with overuse and improved with twice daily Aleve as well as elevation and ice. Prior treatment includes previous partial right knee replacement 2018. Denies recent falls and injuries.     Known hypertension (baseline systolic 150's, monitored at home and with PCP appointments every 3 months), osteoarthritis, depression and anxiety, GERD, gastritis, Schatzki's ring with occasional dysphagia and two previous dilations (none recently).    Patient states due to known dysphagia with previous dilations as well as Schatzki's ring she was given a spinal anesthetic for previous partial knee replacement as they were \"hesitant to intubate her\". States spinal worked well and patient tolerated well.     Sleep apnea questionnaire  1) Do you snore loudly? \"Sometimes\"  2) Do you often feel tired,

## 2025-01-22 NOTE — PROGRESS NOTES
[facility] Joint Replacement Pre-surgical Assessment    Scheduled Surgery Date: 02/11/2025  Surgery Time: 0730    Surgeon: GEOFF  Procedure: left Total Knee    Primary Insurance Coverage [employer]  Pre-op class attended YES, 01/22/2025.    PCP: Rafal Anne APRN - CNP  Clearance received by PCP: Yes    Anticipated Discharge Plan: home  Agency (if applicable): OPPT    Significant PMH:   Surgical History    Procedure Laterality Date Comment Source   KNEE ARTHROPLASTY       KNEE ARTHROSCOPY Right 10/31/2017     KNEE SURGERY Right 06/12/2018 Partial Knee Replacement    NC CPTR-ASST SURGICAL NAVIGATION IMAGE-LESS Right 06/12/2018 [facility] ROBOTIC PARTIAL KNEE ARTHROPLASTY  (SMITH&NEPHEW, FEMORAL NERVE AND POPLITEAL BLOCK PRE-OP, 3080 TABLE)  *ADVANCED performed by Mo Siddiqi DO at [facility] OR    UPPER GASTROINTESTINAL ENDOSCOPY  2003     UPPER GASTROINTESTINAL ENDOSCOPY  10/31/2016 schalibby's; gastritis; fundic gland polyp    US BREAST BIOPSY W LOC DEVICE 1ST LESION LEFT Left 02/25/2021 US BREAST NEEDLE BIOPSY LEFT 2/25/2021 [facility] [place]      ED Notes    ED Notes    Medical History    Diagnosis Date Comment Source   Allergic rhinitis      Anxiety      Depression 2000     Dysphagia      Fundic gland polyposis of stomach      Gastritis      GERD (gastroesophageal reflux disease)      Hypertension      Menopausal symptoms      Osteoarthritis      Prolonged emergence from general anesthesia      Schatzki's ring 10/31/2016     Unspecified sinusitis (chronic)      Wears contact lenses             Smoking history: none    Alcohol history: Current alcohol use: RARE.    Concerns prior to surgery: PT HAS A FWW.    Electronically signed by: JODI SÁNCHEZ RN on 1/22/2025 at 11:06 AM

## 2025-01-23 ENCOUNTER — ANESTHESIA EVENT (OUTPATIENT)
Dept: OPERATING ROOM | Age: 45
DRG: 462 | End: 2025-01-23
Payer: COMMERCIAL

## 2025-01-23 LAB
ABO + RH BLD: NORMAL
ARM BAND NUMBER: NORMAL
BLOOD BANK SAMPLE EXPIRATION: NORMAL
BLOOD GROUP ANTIBODIES SERPL: NEGATIVE
MRSA, DNA, NASAL: ABNORMAL
SPECIMEN DESCRIPTION: ABNORMAL

## 2025-01-24 ENCOUNTER — HOSPITAL ENCOUNTER (OUTPATIENT)
Dept: WOMENS IMAGING | Age: 45
Discharge: HOME | End: 2025-01-26
Payer: COMMERCIAL

## 2025-01-24 DIAGNOSIS — Z12.31 ENCOUNTER FOR SCREENING MAMMOGRAM FOR BREAST CANCER: ICD-10-CM

## 2025-01-24 LAB
EKG ATRIAL RATE: 70 BPM
EKG P AXIS: 58 DEGREES
EKG P-R INTERVAL: 132 MS
EKG Q-T INTERVAL: 392 MS
EKG QRS DURATION: 80 MS
EKG QTC CALCULATION (BAZETT): 423 MS
EKG R AXIS: 45 DEGREES
EKG T AXIS: 39 DEGREES
EKG VENTRICULAR RATE: 70 BPM

## 2025-01-24 PROCEDURE — 77063 BREAST TOMOSYNTHESIS BI: CPT

## 2025-01-28 ENCOUNTER — TELEPHONE (OUTPATIENT)
Dept: ORTHOPEDIC SURGERY | Age: 45
End: 2025-01-28

## 2025-01-28 DIAGNOSIS — Z96.651 STATUS POST RIGHT PARTIAL KNEE REPLACEMENT: Primary | ICD-10-CM

## 2025-01-28 DIAGNOSIS — Z01.818 PREOP TESTING: Primary | ICD-10-CM

## 2025-01-28 RX ORDER — MUPIROCIN 20 MG/G
OINTMENT TOPICAL
Qty: 1 EACH | Refills: 0 | Status: SHIPPED | OUTPATIENT
Start: 2025-01-28

## 2025-01-28 RX ORDER — AMOXICILLIN 500 MG/1
CAPSULE ORAL
Qty: 4 CAPSULE | Refills: 0 | Status: SHIPPED | OUTPATIENT
Start: 2025-01-28

## 2025-01-30 DIAGNOSIS — M17.0 PRIMARY OSTEOARTHRITIS OF BOTH KNEES: Primary | ICD-10-CM

## 2025-01-30 DIAGNOSIS — Z98.890 STATUS POST SURGERY: ICD-10-CM

## 2025-02-04 ENCOUNTER — PATIENT MESSAGE (OUTPATIENT)
Dept: FAMILY MEDICINE CLINIC | Age: 45
End: 2025-02-04

## 2025-02-04 ASSESSMENT — PATIENT HEALTH QUESTIONNAIRE - PHQ9
SUM OF ALL RESPONSES TO PHQ QUESTIONS 1-9: 11
2. FEELING DOWN, DEPRESSED OR HOPELESS: SEVERAL DAYS
4. FEELING TIRED OR HAVING LITTLE ENERGY: MORE THAN HALF THE DAYS
2. FEELING DOWN, DEPRESSED OR HOPELESS: SEVERAL DAYS
SUM OF ALL RESPONSES TO PHQ QUESTIONS 1-9: 11
6. FEELING BAD ABOUT YOURSELF - OR THAT YOU ARE A FAILURE OR HAVE LET YOURSELF OR YOUR FAMILY DOWN: SEVERAL DAYS
SUM OF ALL RESPONSES TO PHQ QUESTIONS 1-9: 11
SUM OF ALL RESPONSES TO PHQ QUESTIONS 1-9: 11
4. FEELING TIRED OR HAVING LITTLE ENERGY: MORE THAN HALF THE DAYS
5. POOR APPETITE OR OVEREATING: MORE THAN HALF THE DAYS
SUM OF ALL RESPONSES TO PHQ9 QUESTIONS 1 & 2: 3
5. POOR APPETITE OR OVEREATING: MORE THAN HALF THE DAYS
7. TROUBLE CONCENTRATING ON THINGS, SUCH AS READING THE NEWSPAPER OR WATCHING TELEVISION: MORE THAN HALF THE DAYS
10. IF YOU CHECKED OFF ANY PROBLEMS, HOW DIFFICULT HAVE THESE PROBLEMS MADE IT FOR YOU TO DO YOUR WORK, TAKE CARE OF THINGS AT HOME, OR GET ALONG WITH OTHER PEOPLE: SOMEWHAT DIFFICULT
1. LITTLE INTEREST OR PLEASURE IN DOING THINGS: MORE THAN HALF THE DAYS
8. MOVING OR SPEAKING SO SLOWLY THAT OTHER PEOPLE COULD HAVE NOTICED. OR THE OPPOSITE - BEING SO FIDGETY OR RESTLESS THAT YOU HAVE BEEN MOVING AROUND A LOT MORE THAN USUAL: NOT AT ALL
10. IF YOU CHECKED OFF ANY PROBLEMS, HOW DIFFICULT HAVE THESE PROBLEMS MADE IT FOR YOU TO DO YOUR WORK, TAKE CARE OF THINGS AT HOME, OR GET ALONG WITH OTHER PEOPLE: SOMEWHAT DIFFICULT
7. TROUBLE CONCENTRATING ON THINGS, SUCH AS READING THE NEWSPAPER OR WATCHING TELEVISION: MORE THAN HALF THE DAYS
3. TROUBLE FALLING OR STAYING ASLEEP: SEVERAL DAYS
6. FEELING BAD ABOUT YOURSELF - OR THAT YOU ARE A FAILURE OR HAVE LET YOURSELF OR YOUR FAMILY DOWN: SEVERAL DAYS
8. MOVING OR SPEAKING SO SLOWLY THAT OTHER PEOPLE COULD HAVE NOTICED. OR THE OPPOSITE, BEING SO FIGETY OR RESTLESS THAT YOU HAVE BEEN MOVING AROUND A LOT MORE THAN USUAL: NOT AT ALL
1. LITTLE INTEREST OR PLEASURE IN DOING THINGS: MORE THAN HALF THE DAYS
SUM OF ALL RESPONSES TO PHQ QUESTIONS 1-9: 11
9. THOUGHTS THAT YOU WOULD BE BETTER OFF DEAD, OR OF HURTING YOURSELF: NOT AT ALL
9. THOUGHTS THAT YOU WOULD BE BETTER OFF DEAD, OR OF HURTING YOURSELF: NOT AT ALL
3. TROUBLE FALLING OR STAYING ASLEEP: SEVERAL DAYS

## 2025-02-04 NOTE — TELEPHONE ENCOUNTER
Can you reach out to Dr. Siddiqi's office to see if there is a need for surgical clearance?  Patient is reaching out stating that clearance is needed, I do not see anything scanned into the media and I do not see any records of this.

## 2025-02-05 ENCOUNTER — NURSE ONLY (OUTPATIENT)
Dept: FAMILY MEDICINE CLINIC | Age: 45
End: 2025-02-05
Payer: COMMERCIAL

## 2025-02-05 VITALS — SYSTOLIC BLOOD PRESSURE: 124 MMHG | DIASTOLIC BLOOD PRESSURE: 80 MMHG | OXYGEN SATURATION: 99 % | HEART RATE: 91 BPM

## 2025-02-05 DIAGNOSIS — R03.0 ELEVATED BP WITHOUT DIAGNOSIS OF HYPERTENSION: Primary | ICD-10-CM

## 2025-02-05 PROCEDURE — 99212 OFFICE O/P EST SF 10 MIN: CPT | Performed by: NURSE PRACTITIONER

## 2025-02-05 SDOH — ECONOMIC STABILITY: FOOD INSECURITY: WITHIN THE PAST 12 MONTHS, YOU WORRIED THAT YOUR FOOD WOULD RUN OUT BEFORE YOU GOT MONEY TO BUY MORE.: PATIENT DECLINED

## 2025-02-05 SDOH — ECONOMIC STABILITY: FOOD INSECURITY: WITHIN THE PAST 12 MONTHS, THE FOOD YOU BOUGHT JUST DIDN'T LAST AND YOU DIDN'T HAVE MONEY TO GET MORE.: PATIENT DECLINED

## 2025-02-05 ASSESSMENT — ANXIETY QUESTIONNAIRES
7. FEELING AFRAID AS IF SOMETHING AWFUL MIGHT HAPPEN: MORE THAN HALF THE DAYS
6. BECOMING EASILY ANNOYED OR IRRITABLE: MORE THAN HALF THE DAYS
1. FEELING NERVOUS, ANXIOUS, OR ON EDGE: MORE THAN HALF THE DAYS
2. NOT BEING ABLE TO STOP OR CONTROL WORRYING: MORE THAN HALF THE DAYS
4. TROUBLE RELAXING: MORE THAN HALF THE DAYS
3. WORRYING TOO MUCH ABOUT DIFFERENT THINGS: MORE THAN HALF THE DAYS
IF YOU CHECKED OFF ANY PROBLEMS ON THIS QUESTIONNAIRE, HOW DIFFICULT HAVE THESE PROBLEMS MADE IT FOR YOU TO DO YOUR WORK, TAKE CARE OF THINGS AT HOME, OR GET ALONG WITH OTHER PEOPLE: SOMEWHAT DIFFICULT
5. BEING SO RESTLESS THAT IT IS HARD TO SIT STILL: MORE THAN HALF THE DAYS
GAD7 TOTAL SCORE: 14

## 2025-02-05 NOTE — PROGRESS NOTES
Sheyla Terrazas is being seen today in office for a LAB/MA visit for a Blood Pressure Recheck.     Sheyla Terrazas was last seen 9/20/2024 and her Blood Pressure was:   BP Readings from Last 1 Encounters:   01/22/25 (!) 150/93          Blood Pressure taken today 2/5/2025 was:  124/80    Has patient taken prescribed blood pressure medication's today: yes -       Current Outpatient Medications   Medication Sig Dispense Refill    mupirocin (BACTROBAN) 2 % ointment Apply ointment to both nares twice daily for 5 days including the morning of surgery. 1 each 0    amoxicillin (AMOXIL) 500 MG capsule Take 4 capsules 1 hour prior to dental procedure . 4 capsule 0    naproxen sodium (ALEVE) 220 MG tablet Take 1 tablet by mouth 2 times daily (with meals)      Multiple Vitamins-Minerals (THERAPEUTIC MULTIVITAMIN-MINERALS) tablet Take 1 tablet by mouth daily      venlafaxine (EFFEXOR XR) 75 MG extended release capsule Take 1 capsule by mouth daily 30 capsule 2    Drospirenone 4 MG TABS Take 1 tablet by mouth daily 28 tablet 12    lisinopril (PRINIVIL;ZESTRIL) 30 MG tablet Take 1 tablet by mouth daily 90 tablet 0    venlafaxine (EFFEXOR XR) 37.5 MG extended release capsule TAKE ONE CAPSULE BY MOUTH ONCE A DAY 30 capsule 3    busPIRone (BUSPAR) 5 MG tablet TAKE 3 TABLETS BY MOUTH 2 TIMES DAILY AS NEEDED FOR ANXIETY 180 tablet 3    famotidine (PEPCID) 20 MG tablet Take 1 tablet by mouth nightly as needed      Rhubarb (ESTROVEN COMPLETE) 4 MG TABS       Acetaminophen (TYLENOL ARTHRITIS PAIN PO) Take by mouth as needed        No current facility-administered medications for this visit.

## 2025-02-05 NOTE — TELEPHONE ENCOUNTER
Can we please call patient and have her come in today for a blood pressure reading, that will be the only thing that will cause us not to be able to clear her as her blood pressure.    I would work on clearing her earlier, did not know she needed clearance, never received a form.

## 2025-02-05 NOTE — PROGRESS NOTES
Vitals:    02/05/25 1403   BP: 124/80   Pulse: 91   SpO2: 99%      Blood pressure looks good.  I reviewed her EKG.  I reviewed her recent blood work.  I do not see a reason why we cannot clear her for surgery.  If we can, reach out to Ortho's office to see if they can send over a clearance form and I am happy to complete this.    Electronically signed by MARIE Clark CNP on 2/5/2025 at 2:15 PM

## 2025-02-05 NOTE — PROGRESS NOTES
Visit Information    Have you changed or started any medications since your last visit including any over-the-counter medicines, vitamins, or herbal medicines? no   Have you stopped taking any of your medications? Is so, why? -  no  Are you having any side effects from any of your medications? - no    Have you seen any other physician or provider since your last visit?  no   Have you had any other diagnostic tests since your last visit?  no   Have you been seen in the emergency room and/or had an admission in a hospital since we last saw you?  no   Have you had your routine dental cleaning in the past 6 months?  no     Do you have an active MyChart account? If no, what is the barrier?  Yes    Patient Care Team:  Rafal Anne APRN - CNP as PCP - General (Nurse Practitioner)  Rafal Anne APRN - CNP as PCP - Empaneled Provider  Hans Bautista MD as Consulting Physician (Gastroenterology)    Medical History Review  Past Medical, Family, and Social History reviewed and does contribute to the patient presenting condition    Health Maintenance   Topic Date Due    DTaP/Tdap/Td vaccine (1 - Tdap) Never done    Flu vaccine (1) 08/01/2024    COVID-19 Vaccine (2 - 2023-24 season) 09/20/2025 (Originally 9/1/2024)    Depression Monitoring  02/04/2026    Breast cancer screen  01/24/2027    Cervical cancer screen  12/16/2027    Lipids  09/23/2029    Colorectal Cancer Screen  06/28/2034    Hepatitis C screen  Completed    HIV screen  Completed    Hepatitis A vaccine  Aged Out    Hib vaccine  Aged Out    HPV vaccine  Aged Out    Polio vaccine  Aged Out    Meningococcal (ACWY) vaccine  Aged Out    Pneumococcal 0-64 years Vaccine  Aged Out    Hepatitis B vaccine  Discontinued

## 2025-02-06 ENCOUNTER — TELEMEDICINE (OUTPATIENT)
Dept: FAMILY MEDICINE CLINIC | Age: 45
End: 2025-02-06
Payer: COMMERCIAL

## 2025-02-06 DIAGNOSIS — F41.9 ANXIETY: ICD-10-CM

## 2025-02-06 DIAGNOSIS — I10 HYPERTENSION, UNSPECIFIED TYPE: ICD-10-CM

## 2025-02-06 DIAGNOSIS — M17.0 PRIMARY OSTEOARTHRITIS OF BOTH KNEES: Primary | ICD-10-CM

## 2025-02-06 PROCEDURE — 99214 OFFICE O/P EST MOD 30 MIN: CPT | Performed by: NURSE PRACTITIONER

## 2025-02-06 ASSESSMENT — ENCOUNTER SYMPTOMS
CONSTIPATION: 0
ABDOMINAL PAIN: 0
BACK PAIN: 0
CHEST TIGHTNESS: 0
BLOOD IN STOOL: 0
DIARRHEA: 0
ABDOMINAL DISTENTION: 0
COUGH: 0
WHEEZING: 0
SHORTNESS OF BREATH: 0
VOMITING: 0
NAUSEA: 0

## 2025-02-06 NOTE — PROGRESS NOTES
2025    TELEHEALTH EVALUATION -- Audio/Visual      Sheyla Terrazas (:  1980) is a 44 y.o. female,Established patient, here for evaluation of the following chief complaint(s): Anxiety and medical clearance     Patient is here today for follow-up for clearance for surgical procedure on .  Patient will be having a total knee replacement of the right side.  I informed her that I never received a clearance form, and she was going to reach out to the orthopedic office and have them send 1 over.  I also reach out to our staff to see if they could reach out for the form to be faxed over.  If not, I can place a letter stating that patient is cleared at a moderate risk.    She came to the office yesterday for blood pressure check it was within normal limits.  Although she does tell me at home she has had some elevated readings and has had some elevated readings at other appointments.  She tells me occasionally she will get a headache and she will check her blood pressure it is a little high.  She is currently taking lisinopril 30 mg we discussed possibly increasing the dose but she does not really have interest in that at this time.  She like to continue to monitor this and see if it improves.  Could be secondary to her surgery coming up, she would like to get through that and reassess.  She does have the lower dosage tablets at home to.  I told her her blood pressure is elevated and will not come down, to go ahead and take an additional 10 mg and please reach out let me know.  We discussed concerning symptoms and when to seek in person evaluation and she states an understanding.  Will continue to monitor this.  Will need close monitoring of blood pressure during and after surgical procedure.    No other major concerns today.  Follow-up as scheduled.    ASSESSMENT/PLAN:    1. Primary osteoarthritis of both knees  -Plan for replacement of the right  -Follow with Ortho  2. Anxiety  -Slightly worsening as of

## 2025-02-11 ENCOUNTER — APPOINTMENT (OUTPATIENT)
Dept: GENERAL RADIOLOGY | Age: 45
DRG: 462 | End: 2025-02-11
Attending: ORTHOPAEDIC SURGERY
Payer: COMMERCIAL

## 2025-02-11 ENCOUNTER — ANESTHESIA (OUTPATIENT)
Dept: OPERATING ROOM | Age: 45
DRG: 462 | End: 2025-02-11
Payer: COMMERCIAL

## 2025-02-11 ENCOUNTER — HOSPITAL ENCOUNTER (INPATIENT)
Age: 45
LOS: 2 days | Discharge: HOME | DRG: 462 | End: 2025-02-13
Attending: ORTHOPAEDIC SURGERY | Admitting: ORTHOPAEDIC SURGERY
Payer: COMMERCIAL

## 2025-02-11 DIAGNOSIS — G89.18 POST-OP PAIN: Primary | ICD-10-CM

## 2025-02-11 PROBLEM — Z96.653 S/P TOTAL KNEE ARTHROPLASTY, BILATERAL: Status: ACTIVE | Noted: 2025-02-11

## 2025-02-11 LAB — HCG, PREGNANCY URINE (POC): NEGATIVE

## 2025-02-11 PROCEDURE — 0SRD0J9 REPLACEMENT OF LEFT KNEE JOINT WITH SYNTHETIC SUBSTITUTE, CEMENTED, OPEN APPROACH: ICD-10-PCS | Performed by: ORTHOPAEDIC SURGERY

## 2025-02-11 PROCEDURE — 7100000001 HC PACU RECOVERY - ADDTL 15 MIN: Performed by: ORTHOPAEDIC SURGERY

## 2025-02-11 PROCEDURE — 7100000000 HC PACU RECOVERY - FIRST 15 MIN: Performed by: ORTHOPAEDIC SURGERY

## 2025-02-11 PROCEDURE — 3700000001 HC ADD 15 MINUTES (ANESTHESIA): Performed by: ORTHOPAEDIC SURGERY

## 2025-02-11 PROCEDURE — 97162 PT EVAL MOD COMPLEX 30 MIN: CPT

## 2025-02-11 PROCEDURE — 73562 X-RAY EXAM OF KNEE 3: CPT

## 2025-02-11 PROCEDURE — 1200000000 HC SEMI PRIVATE

## 2025-02-11 PROCEDURE — 2500000003 HC RX 250 WO HCPCS: Performed by: NURSE ANESTHETIST, CERTIFIED REGISTERED

## 2025-02-11 PROCEDURE — 6370000000 HC RX 637 (ALT 250 FOR IP): Performed by: ORTHOPAEDIC SURGERY

## 2025-02-11 PROCEDURE — 2709999900 HC NON-CHARGEABLE SUPPLY: Performed by: ORTHOPAEDIC SURGERY

## 2025-02-11 PROCEDURE — 81025 URINE PREGNANCY TEST: CPT

## 2025-02-11 PROCEDURE — 2500000003 HC RX 250 WO HCPCS

## 2025-02-11 PROCEDURE — 6360000002 HC RX W HCPCS: Performed by: NURSE ANESTHETIST, CERTIFIED REGISTERED

## 2025-02-11 PROCEDURE — 97535 SELF CARE MNGMENT TRAINING: CPT

## 2025-02-11 PROCEDURE — C1776 JOINT DEVICE (IMPLANTABLE): HCPCS | Performed by: ORTHOPAEDIC SURGERY

## 2025-02-11 PROCEDURE — 6370000000 HC RX 637 (ALT 250 FOR IP): Performed by: ANESTHESIOLOGY

## 2025-02-11 PROCEDURE — 36415 COLL VENOUS BLD VENIPUNCTURE: CPT

## 2025-02-11 PROCEDURE — 3600000005 HC SURGERY LEVEL 5 BASE: Performed by: ORTHOPAEDIC SURGERY

## 2025-02-11 PROCEDURE — 2580000003 HC RX 258: Performed by: ANESTHESIOLOGY

## 2025-02-11 PROCEDURE — 3700000000 HC ANESTHESIA ATTENDED CARE: Performed by: ORTHOPAEDIC SURGERY

## 2025-02-11 PROCEDURE — 3600000015 HC SURGERY LEVEL 5 ADDTL 15MIN: Performed by: ORTHOPAEDIC SURGERY

## 2025-02-11 PROCEDURE — 6360000002 HC RX W HCPCS: Performed by: ORTHOPAEDIC SURGERY

## 2025-02-11 PROCEDURE — 97530 THERAPEUTIC ACTIVITIES: CPT

## 2025-02-11 PROCEDURE — 6360000002 HC RX W HCPCS: Performed by: ANESTHESIOLOGY

## 2025-02-11 PROCEDURE — 97166 OT EVAL MOD COMPLEX 45 MIN: CPT

## 2025-02-11 PROCEDURE — 2720000010 HC SURG SUPPLY STERILE: Performed by: ORTHOPAEDIC SURGERY

## 2025-02-11 PROCEDURE — 6360000002 HC RX W HCPCS

## 2025-02-11 PROCEDURE — 0SRC0M9 REPLACEMENT OF RIGHT KNEE JOINT WITH LATERAL UNICONDYLAR SYNTHETIC SUBSTITUTE, CEMENTED, OPEN APPROACH: ICD-10-PCS | Performed by: ORTHOPAEDIC SURGERY

## 2025-02-11 PROCEDURE — C1713 ANCHOR/SCREW BN/BN,TIS/BN: HCPCS | Performed by: ORTHOPAEDIC SURGERY

## 2025-02-11 PROCEDURE — 6370000000 HC RX 637 (ALT 250 FOR IP)

## 2025-02-11 PROCEDURE — 0SPC0JZ REMOVAL OF SYNTHETIC SUBSTITUTE FROM RIGHT KNEE JOINT, OPEN APPROACH: ICD-10-PCS | Performed by: ORTHOPAEDIC SURGERY

## 2025-02-11 DEVICE — GMK SPHERIKA FEMUR POROUS TIGROWTHC+HA S3+R
Type: IMPLANTABLE DEVICE | Site: KNEE | Status: FUNCTIONAL
Brand: GMK SPHERIKA CEMENTLESS

## 2025-02-11 DEVICE — IMPLANTABLE DEVICE
Type: IMPLANTABLE DEVICE | Site: KNEE | Status: FUNCTIONAL
Brand: BIOMET® BONE CEMENT R

## 2025-02-11 DEVICE — GMK SPHERIKA FEMUR POROUS TIGROWTHC+HA S3+L
Type: IMPLANTABLE DEVICE | Site: KNEE | Status: FUNCTIONAL
Brand: GMK SPHERIKA CEMENTLESS

## 2025-02-11 DEVICE — PATELLA RESURFACING # 2 E-CROSS
Type: IMPLANTABLE DEVICE | Site: KNEE | Status: FUNCTIONAL
Brand: GMK

## 2025-02-11 DEVICE — 3DMETAL TIBIAL TRAY SIZE T4I3L
Type: IMPLANTABLE DEVICE | Site: KNEE | Status: FUNCTIONAL
Brand: GMK 3D METAL TIBIAL TRAY

## 2025-02-11 DEVICE — TIBIAL INSERT FIXED SPHERE FLEX   #3/12 MM R E-CROSS
Type: IMPLANTABLE DEVICE | Site: KNEE | Status: FUNCTIONAL
Brand: GMK SPHERE TOTAL KNEE SYSTEM

## 2025-02-11 DEVICE — TIBIAL INSERT FIXED SPHERE FLEX   #3/10 MM L E-CROSS
Type: IMPLANTABLE DEVICE | Site: KNEE | Status: FUNCTIONAL
Brand: GMK SPHERE TOTAL KNEE SYSTEM

## 2025-02-11 DEVICE — 3DMETAL TIBIAL TRAY SIZE T4I3R
Type: IMPLANTABLE DEVICE | Site: KNEE | Status: FUNCTIONAL
Brand: GMK 3D METAL TIBIAL TRAY

## 2025-02-11 RX ORDER — SODIUM CHLORIDE 9 MG/ML
INJECTION, SOLUTION INTRAVENOUS CONTINUOUS
Status: DISCONTINUED | OUTPATIENT
Start: 2025-02-11 | End: 2025-02-11

## 2025-02-11 RX ORDER — PROCHLORPERAZINE EDISYLATE 5 MG/ML
5 INJECTION INTRAMUSCULAR; INTRAVENOUS
Status: DISCONTINUED | OUTPATIENT
Start: 2025-02-11 | End: 2025-02-11 | Stop reason: HOSPADM

## 2025-02-11 RX ORDER — ONDANSETRON 2 MG/ML
4 INJECTION INTRAMUSCULAR; INTRAVENOUS EVERY 6 HOURS PRN
Status: DISCONTINUED | OUTPATIENT
Start: 2025-02-11 | End: 2025-02-13 | Stop reason: HOSPADM

## 2025-02-11 RX ORDER — DIPHENHYDRAMINE HYDROCHLORIDE 50 MG/ML
12.5 INJECTION INTRAMUSCULAR; INTRAVENOUS
Status: DISCONTINUED | OUTPATIENT
Start: 2025-02-11 | End: 2025-02-11 | Stop reason: HOSPADM

## 2025-02-11 RX ORDER — LIDOCAINE HYDROCHLORIDE 20 MG/ML
INJECTION, SOLUTION EPIDURAL; INFILTRATION; INTRACAUDAL; PERINEURAL
Status: DISCONTINUED | OUTPATIENT
Start: 2025-02-11 | End: 2025-02-11 | Stop reason: SDUPTHER

## 2025-02-11 RX ORDER — CEFAZOLIN SODIUM/WATER 2 G/20 ML
2000 SYRINGE (ML) INTRAVENOUS ONCE
Status: COMPLETED | OUTPATIENT
Start: 2025-02-11 | End: 2025-02-11

## 2025-02-11 RX ORDER — BISACODYL 5 MG/1
5 TABLET, DELAYED RELEASE ORAL DAILY PRN
Status: DISCONTINUED | OUTPATIENT
Start: 2025-02-11 | End: 2025-02-13 | Stop reason: HOSPADM

## 2025-02-11 RX ORDER — MORPHINE SULFATE 4 MG/ML
4 INJECTION, SOLUTION INTRAMUSCULAR; INTRAVENOUS
Status: DISCONTINUED | OUTPATIENT
Start: 2025-02-11 | End: 2025-02-13 | Stop reason: HOSPADM

## 2025-02-11 RX ORDER — TRANEXAMIC ACID 100 MG/ML
INJECTION, SOLUTION INTRAVENOUS
Status: COMPLETED
Start: 2025-02-11 | End: 2025-02-11

## 2025-02-11 RX ORDER — ASPIRIN 81 MG/1
81 TABLET ORAL 2 TIMES DAILY
Status: DISCONTINUED | OUTPATIENT
Start: 2025-02-12 | End: 2025-02-13 | Stop reason: HOSPADM

## 2025-02-11 RX ORDER — SODIUM CHLORIDE 9 MG/ML
INJECTION, SOLUTION INTRAVENOUS PRN
Status: DISCONTINUED | OUTPATIENT
Start: 2025-02-11 | End: 2025-02-13 | Stop reason: HOSPADM

## 2025-02-11 RX ORDER — FAMOTIDINE 20 MG/1
20 TABLET, FILM COATED ORAL 2 TIMES DAILY PRN
Status: DISCONTINUED | OUTPATIENT
Start: 2025-02-11 | End: 2025-02-13 | Stop reason: HOSPADM

## 2025-02-11 RX ORDER — LABETALOL HYDROCHLORIDE 5 MG/ML
INJECTION, SOLUTION INTRAVENOUS
Status: DISCONTINUED | OUTPATIENT
Start: 2025-02-11 | End: 2025-02-11 | Stop reason: SDUPTHER

## 2025-02-11 RX ORDER — SODIUM CHLORIDE 0.9 % (FLUSH) 0.9 %
5-40 SYRINGE (ML) INJECTION PRN
Status: DISCONTINUED | OUTPATIENT
Start: 2025-02-11 | End: 2025-02-11 | Stop reason: HOSPADM

## 2025-02-11 RX ORDER — OXYCODONE AND ACETAMINOPHEN 5; 325 MG/1; MG/1
1-2 TABLET ORAL EVERY 6 HOURS PRN
Qty: 56 TABLET | Refills: 0 | Status: SHIPPED | OUTPATIENT
Start: 2025-02-11 | End: 2025-02-25

## 2025-02-11 RX ORDER — ACETAMINOPHEN 325 MG/1
650 TABLET ORAL EVERY 6 HOURS
Status: DISCONTINUED | OUTPATIENT
Start: 2025-02-11 | End: 2025-02-13 | Stop reason: HOSPADM

## 2025-02-11 RX ORDER — SCOPOLAMINE 1 MG/3D
1 PATCH, EXTENDED RELEASE TRANSDERMAL ONCE
Status: DISCONTINUED | OUTPATIENT
Start: 2025-02-11 | End: 2025-02-13 | Stop reason: HOSPADM

## 2025-02-11 RX ORDER — SODIUM CHLORIDE 0.9 % (FLUSH) 0.9 %
5-40 SYRINGE (ML) INJECTION EVERY 12 HOURS SCHEDULED
Status: DISCONTINUED | OUTPATIENT
Start: 2025-02-11 | End: 2025-02-11 | Stop reason: HOSPADM

## 2025-02-11 RX ORDER — ACETAMINOPHEN 500 MG
1000 TABLET ORAL ONCE
Status: COMPLETED | OUTPATIENT
Start: 2025-02-11 | End: 2025-02-11

## 2025-02-11 RX ORDER — CELECOXIB 200 MG/1
200 CAPSULE ORAL ONCE
Status: COMPLETED | OUTPATIENT
Start: 2025-02-11 | End: 2025-02-11

## 2025-02-11 RX ORDER — ONDANSETRON 2 MG/ML
INJECTION INTRAMUSCULAR; INTRAVENOUS
Status: DISCONTINUED | OUTPATIENT
Start: 2025-02-11 | End: 2025-02-11 | Stop reason: SDUPTHER

## 2025-02-11 RX ORDER — PROPOFOL 10 MG/ML
INJECTION, EMULSION INTRAVENOUS
Status: DISCONTINUED | OUTPATIENT
Start: 2025-02-11 | End: 2025-02-11 | Stop reason: SDUPTHER

## 2025-02-11 RX ORDER — ONDANSETRON 2 MG/ML
4 INJECTION INTRAMUSCULAR; INTRAVENOUS
Status: DISCONTINUED | OUTPATIENT
Start: 2025-02-11 | End: 2025-02-11 | Stop reason: HOSPADM

## 2025-02-11 RX ORDER — SODIUM CHLORIDE 9 MG/ML
INJECTION, SOLUTION INTRAVENOUS CONTINUOUS
Status: DISCONTINUED | OUTPATIENT
Start: 2025-02-11 | End: 2025-02-13 | Stop reason: HOSPADM

## 2025-02-11 RX ORDER — FENTANYL CITRATE 50 UG/ML
INJECTION, SOLUTION INTRAMUSCULAR; INTRAVENOUS
Status: DISCONTINUED | OUTPATIENT
Start: 2025-02-11 | End: 2025-02-11 | Stop reason: SDUPTHER

## 2025-02-11 RX ORDER — DEXAMETHASONE SODIUM PHOSPHATE 10 MG/ML
INJECTION, SOLUTION INTRAMUSCULAR; INTRAVENOUS
Status: DISCONTINUED | OUTPATIENT
Start: 2025-02-11 | End: 2025-02-11 | Stop reason: SDUPTHER

## 2025-02-11 RX ORDER — FENTANYL CITRATE 50 UG/ML
25 INJECTION, SOLUTION INTRAMUSCULAR; INTRAVENOUS EVERY 5 MIN PRN
Status: COMPLETED | OUTPATIENT
Start: 2025-02-11 | End: 2025-02-11

## 2025-02-11 RX ORDER — LIDOCAINE HYDROCHLORIDE 10 MG/ML
1 INJECTION, SOLUTION EPIDURAL; INFILTRATION; INTRACAUDAL; PERINEURAL
Status: DISCONTINUED | OUTPATIENT
Start: 2025-02-12 | End: 2025-02-11 | Stop reason: HOSPADM

## 2025-02-11 RX ORDER — SODIUM CHLORIDE 9 MG/ML
INJECTION, SOLUTION INTRAVENOUS PRN
Status: DISCONTINUED | OUTPATIENT
Start: 2025-02-11 | End: 2025-02-11 | Stop reason: HOSPADM

## 2025-02-11 RX ORDER — CEFAZOLIN SODIUM/WATER 2 G/20 ML
2000 SYRINGE (ML) INTRAVENOUS EVERY 8 HOURS
Status: COMPLETED | OUTPATIENT
Start: 2025-02-11 | End: 2025-02-12

## 2025-02-11 RX ORDER — FENTANYL CITRATE 50 UG/ML
50 INJECTION, SOLUTION INTRAMUSCULAR; INTRAVENOUS EVERY 5 MIN PRN
Status: COMPLETED | OUTPATIENT
Start: 2025-02-11 | End: 2025-02-11

## 2025-02-11 RX ORDER — SODIUM CHLORIDE, SODIUM LACTATE, POTASSIUM CHLORIDE, CALCIUM CHLORIDE 600; 310; 30; 20 MG/100ML; MG/100ML; MG/100ML; MG/100ML
INJECTION, SOLUTION INTRAVENOUS CONTINUOUS
Status: DISCONTINUED | OUTPATIENT
Start: 2025-02-11 | End: 2025-02-11 | Stop reason: HOSPADM

## 2025-02-11 RX ORDER — TRANEXAMIC ACID 100 MG/ML
INJECTION, SOLUTION INTRAVENOUS
Status: DISCONTINUED | OUTPATIENT
Start: 2025-02-11 | End: 2025-02-11 | Stop reason: SDUPTHER

## 2025-02-11 RX ORDER — VENLAFAXINE HYDROCHLORIDE 75 MG/1
75 CAPSULE, EXTENDED RELEASE ORAL DAILY
Status: DISCONTINUED | OUTPATIENT
Start: 2025-02-12 | End: 2025-02-13 | Stop reason: HOSPADM

## 2025-02-11 RX ORDER — CYCLOBENZAPRINE HCL 10 MG
10 TABLET ORAL 3 TIMES DAILY PRN
Status: DISCONTINUED | OUTPATIENT
Start: 2025-02-11 | End: 2025-02-13 | Stop reason: HOSPADM

## 2025-02-11 RX ORDER — MIDAZOLAM HYDROCHLORIDE 1 MG/ML
INJECTION, SOLUTION INTRAMUSCULAR; INTRAVENOUS
Status: DISCONTINUED | OUTPATIENT
Start: 2025-02-11 | End: 2025-02-11 | Stop reason: SDUPTHER

## 2025-02-11 RX ORDER — MORPHINE SULFATE 2 MG/ML
2 INJECTION, SOLUTION INTRAMUSCULAR; INTRAVENOUS
Status: DISCONTINUED | OUTPATIENT
Start: 2025-02-11 | End: 2025-02-13 | Stop reason: HOSPADM

## 2025-02-11 RX ORDER — VANCOMYCIN HYDROCHLORIDE 1 G/20ML
INJECTION, POWDER, LYOPHILIZED, FOR SOLUTION INTRAVENOUS
Status: DISCONTINUED
Start: 2025-02-11 | End: 2025-02-11 | Stop reason: WASHOUT

## 2025-02-11 RX ORDER — VANCOMYCIN HYDROCHLORIDE 1 G/20ML
INJECTION, POWDER, LYOPHILIZED, FOR SOLUTION INTRAVENOUS PRN
Status: DISCONTINUED | OUTPATIENT
Start: 2025-02-11 | End: 2025-02-11 | Stop reason: ALTCHOICE

## 2025-02-11 RX ORDER — DOCUSATE SODIUM 100 MG/1
100 CAPSULE, LIQUID FILLED ORAL 2 TIMES DAILY
Qty: 20 CAPSULE | Refills: 1 | Status: SHIPPED | OUTPATIENT
Start: 2025-02-11

## 2025-02-11 RX ORDER — ASPIRIN 81 MG/1
81 TABLET ORAL 2 TIMES DAILY
Qty: 84 TABLET | Refills: 0 | Status: SHIPPED | OUTPATIENT
Start: 2025-02-11

## 2025-02-11 RX ORDER — OXYCODONE HYDROCHLORIDE 5 MG/1
5 TABLET ORAL
Status: COMPLETED | OUTPATIENT
Start: 2025-02-11 | End: 2025-02-11

## 2025-02-11 RX ORDER — GABAPENTIN 300 MG/1
300 CAPSULE ORAL ONCE
Status: DISCONTINUED | OUTPATIENT
Start: 2025-02-11 | End: 2025-02-11 | Stop reason: HOSPADM

## 2025-02-11 RX ORDER — OXYCODONE HYDROCHLORIDE 5 MG/1
10 TABLET ORAL EVERY 4 HOURS PRN
Status: DISCONTINUED | OUTPATIENT
Start: 2025-02-11 | End: 2025-02-13 | Stop reason: HOSPADM

## 2025-02-11 RX ORDER — OXYCODONE HYDROCHLORIDE 5 MG/1
5 TABLET ORAL EVERY 4 HOURS PRN
Status: DISCONTINUED | OUTPATIENT
Start: 2025-02-11 | End: 2025-02-13 | Stop reason: HOSPADM

## 2025-02-11 RX ORDER — SODIUM CHLORIDE 0.9 % (FLUSH) 0.9 %
5-40 SYRINGE (ML) INJECTION EVERY 12 HOURS SCHEDULED
Status: DISCONTINUED | OUTPATIENT
Start: 2025-02-11 | End: 2025-02-13 | Stop reason: HOSPADM

## 2025-02-11 RX ORDER — VENLAFAXINE HYDROCHLORIDE 37.5 MG/1
37.5 CAPSULE, EXTENDED RELEASE ORAL DAILY
Status: DISCONTINUED | OUTPATIENT
Start: 2025-02-12 | End: 2025-02-13 | Stop reason: HOSPADM

## 2025-02-11 RX ORDER — SODIUM CHLORIDE 0.9 % (FLUSH) 0.9 %
5-40 SYRINGE (ML) INJECTION PRN
Status: DISCONTINUED | OUTPATIENT
Start: 2025-02-11 | End: 2025-02-13 | Stop reason: HOSPADM

## 2025-02-11 RX ORDER — VANCOMYCIN HYDROCHLORIDE 1 G/20ML
INJECTION, POWDER, LYOPHILIZED, FOR SOLUTION INTRAVENOUS
Status: DISPENSED
Start: 2025-02-11 | End: 2025-02-11

## 2025-02-11 RX ADMIN — SODIUM CHLORIDE, PRESERVATIVE FREE 10 ML: 5 INJECTION INTRAVENOUS at 18:02

## 2025-02-11 RX ADMIN — FENTANYL CITRATE 25 MCG: 50 INJECTION INTRAMUSCULAR; INTRAVENOUS at 11:02

## 2025-02-11 RX ADMIN — FENTANYL CITRATE 25 MCG: 50 INJECTION INTRAMUSCULAR; INTRAVENOUS at 12:52

## 2025-02-11 RX ADMIN — Medication 2000 MG: at 10:32

## 2025-02-11 RX ADMIN — DEXAMETHASONE SODIUM PHOSPHATE 10 MG: 10 INJECTION INTRAMUSCULAR; INTRAVENOUS at 10:31

## 2025-02-11 RX ADMIN — FENTANYL CITRATE 25 MCG: 50 INJECTION INTRAMUSCULAR; INTRAVENOUS at 12:00

## 2025-02-11 RX ADMIN — FENTANYL CITRATE 25 MCG: 50 INJECTION INTRAMUSCULAR; INTRAVENOUS at 10:57

## 2025-02-11 RX ADMIN — OXYCODONE HYDROCHLORIDE 5 MG: 5 TABLET ORAL at 14:57

## 2025-02-11 RX ADMIN — MORPHINE SULFATE 4 MG: 4 INJECTION, SOLUTION INTRAMUSCULAR; INTRAVENOUS at 18:01

## 2025-02-11 RX ADMIN — FENTANYL CITRATE 25 MCG: 50 INJECTION INTRAMUSCULAR; INTRAVENOUS at 12:41

## 2025-02-11 RX ADMIN — MIDAZOLAM 1 MG: 1 INJECTION INTRAMUSCULAR; INTRAVENOUS at 10:23

## 2025-02-11 RX ADMIN — SODIUM CHLORIDE, POTASSIUM CHLORIDE, SODIUM LACTATE AND CALCIUM CHLORIDE: 600; 310; 30; 20 INJECTION, SOLUTION INTRAVENOUS at 07:47

## 2025-02-11 RX ADMIN — LABETALOL HYDROCHLORIDE 5 MG: 5 INJECTION INTRAVENOUS at 11:12

## 2025-02-11 RX ADMIN — LIDOCAINE HYDROCHLORIDE 75 MG: 20 INJECTION, SOLUTION EPIDURAL; INFILTRATION; INTRACAUDAL; PERINEURAL at 10:27

## 2025-02-11 RX ADMIN — MIDAZOLAM 1 MG: 1 INJECTION INTRAMUSCULAR; INTRAVENOUS at 10:20

## 2025-02-11 RX ADMIN — FENTANYL CITRATE 50 MCG: 50 INJECTION INTRAMUSCULAR; INTRAVENOUS at 14:27

## 2025-02-11 RX ADMIN — SODIUM CHLORIDE, POTASSIUM CHLORIDE, SODIUM LACTATE AND CALCIUM CHLORIDE: 600; 310; 30; 20 INJECTION, SOLUTION INTRAVENOUS at 11:46

## 2025-02-11 RX ADMIN — CELECOXIB 200 MG: 200 CAPSULE ORAL at 07:57

## 2025-02-11 RX ADMIN — FENTANYL CITRATE 25 MCG: 50 INJECTION INTRAMUSCULAR; INTRAVENOUS at 13:06

## 2025-02-11 RX ADMIN — ONDANSETRON 4 MG: 2 INJECTION, SOLUTION INTRAMUSCULAR; INTRAVENOUS at 12:35

## 2025-02-11 RX ADMIN — OXYCODONE HYDROCHLORIDE 10 MG: 5 TABLET ORAL at 21:40

## 2025-02-11 RX ADMIN — FENTANYL CITRATE 25 MCG: 50 INJECTION INTRAMUSCULAR; INTRAVENOUS at 13:37

## 2025-02-11 RX ADMIN — ACETAMINOPHEN 1000 MG: 500 TABLET ORAL at 07:57

## 2025-02-11 RX ADMIN — FENTANYL CITRATE 25 MCG: 50 INJECTION INTRAMUSCULAR; INTRAVENOUS at 12:56

## 2025-02-11 RX ADMIN — FENTANYL CITRATE 50 MCG: 50 INJECTION INTRAMUSCULAR; INTRAVENOUS at 13:21

## 2025-02-11 RX ADMIN — ACETAMINOPHEN 650 MG: 325 TABLET ORAL at 18:00

## 2025-02-11 RX ADMIN — FENTANYL CITRATE 50 MCG: 50 INJECTION INTRAMUSCULAR; INTRAVENOUS at 10:27

## 2025-02-11 RX ADMIN — Medication 2000 MG: at 18:02

## 2025-02-11 RX ADMIN — CYCLOBENZAPRINE 10 MG: 10 TABLET, FILM COATED ORAL at 23:02

## 2025-02-11 RX ADMIN — LISINOPRIL 30 MG: 20 TABLET ORAL at 18:16

## 2025-02-11 RX ADMIN — PROPOFOL 150 MG: 10 INJECTION, EMULSION INTRAVENOUS at 10:27

## 2025-02-11 RX ADMIN — TRANEXAMIC ACID 1000 MG: 100 INJECTION, SOLUTION INTRAVENOUS at 12:30

## 2025-02-11 RX ADMIN — TRANEXAMIC ACID 1000 MG: 100 INJECTION, SOLUTION INTRAVENOUS at 10:43

## 2025-02-11 RX ADMIN — PROPOFOL 25 MCG/KG/MIN: 10 INJECTION, EMULSION INTRAVENOUS at 10:30

## 2025-02-11 RX ADMIN — ACETAMINOPHEN 650 MG: 325 TABLET ORAL at 23:02

## 2025-02-11 ASSESSMENT — PAIN SCALES - GENERAL
PAINLEVEL_OUTOF10: 7
PAINLEVEL_OUTOF10: 8
PAINLEVEL_OUTOF10: 9
PAINLEVEL_OUTOF10: 8
PAINLEVEL_OUTOF10: 5
PAINLEVEL_OUTOF10: 9
PAINLEVEL_OUTOF10: 4
PAINLEVEL_OUTOF10: 8
PAINLEVEL_OUTOF10: 8
PAINLEVEL_OUTOF10: 9

## 2025-02-11 ASSESSMENT — PAIN DESCRIPTION - ORIENTATION
ORIENTATION: RIGHT;LEFT
ORIENTATION: RIGHT;LEFT
ORIENTATION: LEFT;RIGHT
ORIENTATION: RIGHT;LEFT

## 2025-02-11 ASSESSMENT — PAIN DESCRIPTION - DESCRIPTORS
DESCRIPTORS: THROBBING
DESCRIPTORS: THROBBING
DESCRIPTORS: ACHING;THROBBING
DESCRIPTORS: ACHING;DISCOMFORT;CRAMPING
DESCRIPTORS: THROBBING;DISCOMFORT
DESCRIPTORS: ACHING
DESCRIPTORS: ACHING;SORE

## 2025-02-11 ASSESSMENT — PAIN DESCRIPTION - LOCATION
LOCATION: KNEE
LOCATION: KNEE
LOCATION: LEG
LOCATION: KNEE

## 2025-02-11 ASSESSMENT — PAIN DESCRIPTION - PAIN TYPE: TYPE: ACUTE PAIN;SURGICAL PAIN

## 2025-02-11 ASSESSMENT — PAIN - FUNCTIONAL ASSESSMENT
PAIN_FUNCTIONAL_ASSESSMENT: ACTIVITIES ARE NOT PREVENTED
PAIN_FUNCTIONAL_ASSESSMENT: PREVENTS OR INTERFERES SOME ACTIVE ACTIVITIES AND ADLS
PAIN_FUNCTIONAL_ASSESSMENT: 0-10
PAIN_FUNCTIONAL_ASSESSMENT: 0-10

## 2025-02-11 ASSESSMENT — ENCOUNTER SYMPTOMS: SHORTNESS OF BREATH: 0

## 2025-02-11 NOTE — ANESTHESIA PRE PROCEDURE
Department of Anesthesiology  Preprocedure Note       Name:  Sheyla Terrazas   Age:  44 y.o.  :  1980                                          MRN:  [id number]         Date:  2025      Surgeon: Surgeon(s):  Mo Siddiqi DO    Procedure: Procedure(s):  KNEE TOTAL ARTHROPLASTY BILATERAL WITH REMOVAL OF RIGHT UNI KNEE    Medications prior to admission:   Prior to Admission medications    Medication Sig Start Date End Date Taking? Authorizing Provider   mupirocin (BACTROBAN) 2 % ointment Apply ointment to both nares twice daily for 5 days including the morning of surgery. 25  Yes Mo Siddiqi DO   venlafaxine (EFFEXOR XR) 75 MG extended release capsule Take 1 capsule by mouth daily 25  Yes Rafal Anne APRN - CNP   Drospirenone 4 MG TABS Take 1 tablet by mouth daily 1/10/25  Yes Irais Fallon APRN - CNP   lisinopril (PRINIVIL;ZESTRIL) 30 MG tablet Take 1 tablet by mouth daily 12/31/24 3/31/25 Yes Rafal Anne APRN - CNP   venlafaxine (EFFEXOR XR) 37.5 MG extended release capsule TAKE ONE CAPSULE BY MOUTH ONCE A DAY 24  Yes Rafal Anne APRN - CNP   busPIRone (BUSPAR) 5 MG tablet TAKE 3 TABLETS BY MOUTH 2 TIMES DAILY AS NEEDED FOR ANXIETY 24  Yes Rafal Anne APRN - CNP   famotidine (PEPCID) 20 MG tablet Take 1 tablet by mouth nightly as needed   Yes ProviderRiky MD   Rhubarb (ESTROVEN COMPLETE) 4 MG TABS  22  Yes Riky Nobles MD   Acetaminophen (TYLENOL ARTHRITIS PAIN PO) Take by mouth as needed    Yes ProviderRiky MD   amoxicillin (AMOXIL) 500 MG capsule Take 4 capsules 1 hour prior to dental procedure . 25   Mo Siddiqi DO   naproxen sodium (ALEVE) 220 MG tablet Take 1 tablet by mouth 2 times daily (with meals)    ProviderRiky MD   Multiple Vitamins-Minerals (THERAPEUTIC MULTIVITAMIN-MINERALS) tablet Take 1 tablet by mouth daily    ProviderRiky MD       Current medications:    Current

## 2025-02-11 NOTE — OP NOTE
Operative Note      Patient: Sheyla Terrazas  YOB: 1980  MRN: [id number]    Date of Procedure: 2/11/2025    Pre-Op Diagnosis Codes:      * Primary osteoarthritis of both knees [M17.0]    Post-Op Diagnosis: Osteoarthritis bilateral knees       Procedure(s):  Revision right total knee arthroplasty, left total knee arthroplasty    Surgeon(s):  Mo Siddiqi DO    Assistant:   First Assistant: Darwin Cole  Resident: Fabio Cline DO    Anesthesia: General    Estimated Blood Loss (mL): 150    Complications: None    Specimens:   * No specimens in log *    Implants:  Implant Name Type Inv. Item Serial No.  Lot No. LRB No. Used Action   CEMENT BNE 40GM HI VISC RADPQ FOR REV SURG - [id number]  CEMENT BNE 40GM HI VISC RADPQ FOR REV SURG  [employer]- [id number] Right 1 Implanted   CEMENT BNE 40GM HI VISC RADPQ FOR REV SURG - [id number]  CEMENT BNE 40GM HI VISC RADPQ FOR REV SURG  [employer]- [id number] Left 1 Implanted   COMPONENT GMK SPHERIKA FEMUR POROUS TIGROWTHC+HA S3+R - [id number]  COMPONENT GMK SPHERIKA FEMUR POROUS TIGROWTHC+HA S3+R  [employer] [place] [id number] Right 1 Implanted   IMPLANT PATELLAR SZ 2 E-CROSS RESURF STRL GMK SPHR - [id number]  IMPLANT PATELLAR SZ 2 E-CROSS RESURF STRL GMK SPHR  [employer] [facility] [id number] Right 1 Implanted   INSERT TIB THK 12 MM SZ 3 A/P 44 MM M/L 69 MM E-CROSS RT - [id number]  INSERT TIB THK 12 MM SZ 3 A/P 44 MM M/L 69 MM E-CROSS RT  [employer] [id number] Right 1 Implanted   TRAY TIB SZ T4I3 3D MTL RT KNEE STRL GMK - [id number]  TRAY TIB SZ T4I3 3D MTL RT KNEE STRL GMK  [employer] [facility] [id number] Right 1 Implanted   IMPLANT PATELLAR SZ 2 E-CROSS RESURF STRL GMK SPHR - [id number]  IMPLANT PATELLAR SZ 2 E-CROSS RESURF STRL GMK SPHR  [employer] [facility] [id number] Left 1 Implanted   INSERT TIB THK 10 MM SZ 3 VIT E HIGHLY CROSSLINKED UHMWPE LT - [id number]  INSERT TIB THK 10 MM SZ 3 VIT E HIGHLY CROSSLINKED UHMWPE LT  [employer]

## 2025-02-11 NOTE — BRIEF OP NOTE
Brief Postoperative Note      Patient: Sheyla Terrazas  YOB: 1980  MRN: [id number]    Date of Procedure: 2/11/2025    Pre-Op Diagnosis Codes:      * Primary osteoarthritis of both knees [M17.0]    Post-Op Diagnosis: Primary arthritis bilateral knees, history right medial unicompartmental knee arthroplasty       Procedure(s):  Left total knee arthroplasty, revision right total knee arthroplasty.      Surgeon(s):  Mo Siddiqi DO    Assistant:  First Assistant: Darwin Cole  Resident: Fabio Cline DO    Anesthesia: General    Estimated Blood Loss (mL): 150    Complications: None    Specimens:   * No specimens in log *    Implants:  Implant Name Type Inv. Item Serial No.  Lot No. LRB No. Used Action   CEMENT BNE 40GM HI VISC RADPQ FOR REV SURG - [id number]  CEMENT BNE 40GM HI VISC RADPQ FOR REV SURG  [employer]- [id number] Right 1 Implanted   CEMENT BNE 40GM HI VISC RADPQ FOR REV SURG - [id number]  CEMENT BNE 40GM HI VISC RADPQ FOR REV SURG  [employer]- [id number] Left 1 Implanted   COMPONENT GMK SPHERIKA FEMUR POROUS TIGROWTHC+HA S3+R - [id number]  COMPONENT GMK SPHERIKA FEMUR POROUS TIGROWTHC+HA S3+R  [employer] [id number] Right 1 Implanted   IMPLANT PATELLAR SZ 2 E-CROSS RESURF STRL GMK SPHR - [id number]  IMPLANT PATELLAR SZ 2 E-CROSS RESURF STRL GMK SPHR  [employer] [facility] [id number] Right 1 Implanted   INSERT TIB THK 12 MM SZ 3 A/P 44 MM M/L 69 MM E-CROSS RT - [id number]  INSERT TIB THK 12 MM SZ 3 A/P 44 MM M/L 69 MM E-CROSS RT  [employer] [id number] Right 1 Implanted   TRAY TIB SZ T4I3 3D MTL RT KNEE STRL GMK - [id number]  TRAY TIB SZ T4I3 3D MTL RT KNEE STRL GMK  [employer] [facility] [id number] Right 1 Implanted   IMPLANT PATELLAR SZ 2 E-CROSS RESURF STRL GMK SPHR - [id number]  IMPLANT PATELLAR SZ 2 E-CROSS RESURF STRL GMK SPHR  [employer] [facility] [id number] Left 1 Implanted   INSERT TIB THK 10 MM SZ 3 VIT E HIGHLY CROSSLINKED UHMWPE LT - [id number]

## 2025-02-11 NOTE — DISCHARGE INSTRUCTIONS
OK to wash the skin around your incision with mild soap and water.  Do change your dressing as you were told.   Do notify your doctor if the dressing becomes wet or dirty.  Do use a clean washcloth every time when cleaning your incision.   Do sleep on clean linens.  Do keep pets away from incision site.  Don’t sit in a bathtub, pool, or hot tub until your incision is fully closed and any drains are removed.  Don’t scrub, pick, scratch, or pull at your incision.  Don’t use oils, lotions, or creams on your incision unless your healthcare provider approves it.    Follow-up  You will have one or more follow-up visits with your healthcare provider. These are needed to check how well you’re healing. Your drain, stitches, or staples may also be removed during these visits. Do not miss your follow-up visit, even if you are feeling better.      Call your healthcare provider right away if you have the following:  Fever of 100.4°F (38°C) or higher, or as advised by your healthcare provider.  Chest pain or trouble breathing.  Pain or tenderness in your leg(s).  Increased pain, redness, swelling, bleeding, or foul-smelling drainage at the incision site.  Incision changes, separates or is hot to the touch.  Problems with the drain if you have one.  Itchy, swollen skin; skin rash.    Medicines, Diet, and Activity:   Refer to your discharge paperwork for further instructions

## 2025-02-11 NOTE — ANESTHESIA POSTPROCEDURE EVALUATION
Department of Anesthesiology  Postprocedure Note    Patient: Sheyla Terrazas  MRN: [id number]  YOB: 1980  Date of evaluation: 2/11/2025    Procedure Summary       Date: 02/11/25 Room / Location: [address]    Anesthesia Start: 1020 Anesthesia Stop: 1256    Procedure: KNEE TOTAL ARTHROPLASTY BILATERAL WITH REMOVAL OF RIGHT UNI KNEE (Bilateral: Knee) Diagnosis:       Primary osteoarthritis of both knees      (Primary osteoarthritis of both knees [M17.0])    Surgeons: Mo Siddiqi DO Responsible Provider: Giovanni Lambert MD    Anesthesia Type: General ASA Status: 2            Anesthesia Type: General    Ko Phase I: Ko Score: 8    Ko Phase II:      Anesthesia Post Evaluation    Patient location during evaluation: PACU  Patient participation: complete - patient participated  Level of consciousness: awake and alert  Airway patency: patent  Nausea & Vomiting: no nausea and no vomiting  Cardiovascular status: hemodynamically stable  Respiratory status: acceptable  Hydration status: euvolemic  Pain management: adequate    No notable events documented.

## 2025-02-11 NOTE — H&P
BY MOUTH ONCE A DAY 11/7/24   Rafal Anne APRN - CNP   busPIRone (BUSPAR) 5 MG tablet TAKE 3 TABLETS BY MOUTH 2 TIMES DAILY AS NEEDED FOR ANXIETY 6/12/24   Rafal Anne APRN - CNP   famotidine (PEPCID) 20 MG tablet Take 1 tablet by mouth nightly as needed    ProviderRiky MD   Rhubarb (ESTROVEN COMPLETE) 4 MG TABS  12/18/22   ProviderRiky MD   Acetaminophen (TYLENOL ARTHRITIS PAIN PO) Take by mouth as needed     ProviderRiky MD        Allergies:     Sulfa antibiotics    Social History:     Tobacco:    reports that she has never smoked. She has never used smokeless tobacco.  Alcohol:      reports current alcohol use.  Drug Use:  reports no history of drug use.    Family History:     Family History   Problem Relation Age of Onset    Heart Disease Mother         MVP    High Cholesterol Mother     Cervical Cancer Mother     Cancer Mother         Cervical Cancer    Osteoarthritis Mother     High Blood Pressure Father     Hypertension Father     High Cholesterol Maternal Grandmother     Stroke Maternal Grandfather     Colon Cancer Paternal Grandmother         colorectal cancer    Alzheimer's Disease Paternal Grandfather     Breast Cancer Neg Hx        Review of Systems:     Positive and Negative as described in HPI.    CONSTITUTIONAL: Negative for fevers, chills, sweats, fatigue, and weight loss.  HEENT: Contacts Negative for  hearing changes, rhinorrhea, and throat pain.  RESPIRATORY: Negative for shortness of breath, cough, congestion, and wheezing.  CARDIOVASCULAR: Negative for chest pain, blood clot, irregular heartbeat, and palpitations.  GASTROINTESTINAL: GERD- managed on BID Pepcid. Intermittent dysphagia- \"controlled well with managing diet and eating habits\" Negative fo nausea, vomiting, diarrhea, constipation, change in bowel habits, and abdominal pain.   GENITOURINARY: Negative for difficulty of urination, burning with urination, and frequency.   INTEGUMENT: Negative for rash, skin

## 2025-02-12 LAB
ANION GAP SERPL CALCULATED.3IONS-SCNC: 11 MMOL/L (ref 9–16)
BUN SERPL-MCNC: 13 MG/DL (ref 6–20)
CALCIUM SERPL-MCNC: 8.1 MG/DL (ref 8.6–10.4)
CHLORIDE SERPL-SCNC: 103 MMOL/L (ref 98–107)
CO2 SERPL-SCNC: 21 MMOL/L (ref 20–31)
CREAT SERPL-MCNC: 0.9 MG/DL (ref 0.5–0.9)
ERYTHROCYTE [DISTWIDTH] IN BLOOD BY AUTOMATED COUNT: 12.2 % (ref 11.8–14.4)
GFR, ESTIMATED: 83 ML/MIN/1.73M2
GLUCOSE SERPL-MCNC: 154 MG/DL (ref 74–99)
HCT VFR BLD AUTO: 33.8 % (ref 36.3–47.1)
HGB BLD-MCNC: 11.4 G/DL (ref 11.9–15.1)
MCH RBC QN AUTO: 29.8 PG (ref 25.2–33.5)
MCHC RBC AUTO-ENTMCNC: 33.7 G/DL (ref 28.4–34.8)
MCV RBC AUTO: 88.3 FL (ref 82.6–102.9)
NRBC BLD-RTO: 0 PER 100 WBC
PLATELET # BLD AUTO: 231 K/UL (ref 138–453)
PMV BLD AUTO: 9.8 FL (ref 8.1–13.5)
POTASSIUM SERPL-SCNC: 4.3 MMOL/L (ref 3.7–5.3)
RBC # BLD AUTO: 3.83 M/UL (ref 3.95–5.11)
SODIUM SERPL-SCNC: 135 MMOL/L (ref 136–145)
WBC OTHER # BLD: 17.2 K/UL (ref 3.5–11.3)

## 2025-02-12 PROCEDURE — 6370000000 HC RX 637 (ALT 250 FOR IP): Performed by: ORTHOPAEDIC SURGERY

## 2025-02-12 PROCEDURE — 97168 OT RE-EVAL EST PLAN CARE: CPT

## 2025-02-12 PROCEDURE — 1200000000 HC SEMI PRIVATE

## 2025-02-12 PROCEDURE — 97535 SELF CARE MNGMENT TRAINING: CPT

## 2025-02-12 PROCEDURE — 2500000003 HC RX 250 WO HCPCS

## 2025-02-12 PROCEDURE — 36415 COLL VENOUS BLD VENIPUNCTURE: CPT

## 2025-02-12 PROCEDURE — 97110 THERAPEUTIC EXERCISES: CPT

## 2025-02-12 PROCEDURE — 97530 THERAPEUTIC ACTIVITIES: CPT

## 2025-02-12 PROCEDURE — 80048 BASIC METABOLIC PNL TOTAL CA: CPT

## 2025-02-12 PROCEDURE — 85027 COMPLETE CBC AUTOMATED: CPT

## 2025-02-12 PROCEDURE — 6360000002 HC RX W HCPCS

## 2025-02-12 PROCEDURE — 6370000000 HC RX 637 (ALT 250 FOR IP)

## 2025-02-12 RX ADMIN — FAMOTIDINE 20 MG: 20 TABLET, FILM COATED ORAL at 21:50

## 2025-02-12 RX ADMIN — OXYCODONE HYDROCHLORIDE 10 MG: 5 TABLET ORAL at 17:37

## 2025-02-12 RX ADMIN — VENLAFAXINE HYDROCHLORIDE 75 MG: 75 CAPSULE, EXTENDED RELEASE ORAL at 08:56

## 2025-02-12 RX ADMIN — SODIUM CHLORIDE, PRESERVATIVE FREE 10 ML: 5 INJECTION INTRAVENOUS at 08:56

## 2025-02-12 RX ADMIN — ACETAMINOPHEN 650 MG: 325 TABLET ORAL at 22:50

## 2025-02-12 RX ADMIN — ACETAMINOPHEN 650 MG: 325 TABLET ORAL at 05:36

## 2025-02-12 RX ADMIN — ASPIRIN 81 MG: 81 TABLET, COATED ORAL at 08:56

## 2025-02-12 RX ADMIN — OXYCODONE HYDROCHLORIDE 10 MG: 5 TABLET ORAL at 05:36

## 2025-02-12 RX ADMIN — ACETAMINOPHEN 650 MG: 325 TABLET ORAL at 12:38

## 2025-02-12 RX ADMIN — Medication 2000 MG: at 02:25

## 2025-02-12 RX ADMIN — SODIUM CHLORIDE, PRESERVATIVE FREE 10 ML: 5 INJECTION INTRAVENOUS at 21:42

## 2025-02-12 RX ADMIN — OXYCODONE HYDROCHLORIDE 10 MG: 5 TABLET ORAL at 21:41

## 2025-02-12 RX ADMIN — ASPIRIN 81 MG: 81 TABLET, COATED ORAL at 21:40

## 2025-02-12 RX ADMIN — CYCLOBENZAPRINE 10 MG: 10 TABLET, FILM COATED ORAL at 16:37

## 2025-02-12 RX ADMIN — ACETAMINOPHEN 650 MG: 325 TABLET ORAL at 16:37

## 2025-02-12 RX ADMIN — VENLAFAXINE HYDROCHLORIDE 37.5 MG: 37.5 CAPSULE, EXTENDED RELEASE ORAL at 08:56

## 2025-02-12 RX ADMIN — SODIUM CHLORIDE, PRESERVATIVE FREE 10 ML: 5 INJECTION INTRAVENOUS at 02:26

## 2025-02-12 ASSESSMENT — PAIN - FUNCTIONAL ASSESSMENT
PAIN_FUNCTIONAL_ASSESSMENT: PREVENTS OR INTERFERES SOME ACTIVE ACTIVITIES AND ADLS

## 2025-02-12 ASSESSMENT — PAIN SCALES - GENERAL
PAINLEVEL_OUTOF10: 7
PAINLEVEL_OUTOF10: 5
PAINLEVEL_OUTOF10: 10
PAINLEVEL_OUTOF10: 8
PAINLEVEL_OUTOF10: 5
PAINLEVEL_OUTOF10: 9
PAINLEVEL_OUTOF10: 9
PAINLEVEL_OUTOF10: 7
PAINLEVEL_OUTOF10: 7
PAINLEVEL_OUTOF10: 10
PAINLEVEL_OUTOF10: 8
PAINLEVEL_OUTOF10: 10

## 2025-02-12 ASSESSMENT — PAIN DESCRIPTION - DESCRIPTORS
DESCRIPTORS: CRAMPING
DESCRIPTORS: ACHING;DULL;DISCOMFORT
DESCRIPTORS: CRAMPING

## 2025-02-12 ASSESSMENT — PAIN DESCRIPTION - LOCATION
LOCATION: KNEE
LOCATION: KNEE
LOCATION: LEG;KNEE
LOCATION: KNEE

## 2025-02-12 ASSESSMENT — PAIN DESCRIPTION - PAIN TYPE
TYPE: ACUTE PAIN;SURGICAL PAIN
TYPE: ACUTE PAIN;SURGICAL PAIN

## 2025-02-12 ASSESSMENT — PAIN DESCRIPTION - ORIENTATION
ORIENTATION: RIGHT;LEFT

## 2025-02-12 ASSESSMENT — PAIN DESCRIPTION - ONSET
ONSET: ON-GOING
ONSET: ON-GOING

## 2025-02-12 ASSESSMENT — PAIN DESCRIPTION - FREQUENCY
FREQUENCY: CONTINUOUS

## 2025-02-12 NOTE — CARE COORDINATION
Transitional Planning  Met with patient at Bedside.  Plan to return home today,  to provide transportation.  Patient has a FWW and her follow up appointment is with Dr. Siddiqi on 2/26/25.  Patient denies further discharge needs at this time.

## 2025-02-13 VITALS
TEMPERATURE: 98.2 F | HEART RATE: 108 BPM | WEIGHT: 205 LBS | DIASTOLIC BLOOD PRESSURE: 92 MMHG | BODY MASS INDEX: 35 KG/M2 | OXYGEN SATURATION: 100 % | RESPIRATION RATE: 16 BRPM | HEIGHT: 64 IN | SYSTOLIC BLOOD PRESSURE: 161 MMHG

## 2025-02-13 PROCEDURE — 6370000000 HC RX 637 (ALT 250 FOR IP)

## 2025-02-13 PROCEDURE — 97116 GAIT TRAINING THERAPY: CPT

## 2025-02-13 PROCEDURE — 6370000000 HC RX 637 (ALT 250 FOR IP): Performed by: ORTHOPAEDIC SURGERY

## 2025-02-13 PROCEDURE — 97535 SELF CARE MNGMENT TRAINING: CPT

## 2025-02-13 PROCEDURE — 2500000003 HC RX 250 WO HCPCS

## 2025-02-13 PROCEDURE — 97164 PT RE-EVAL EST PLAN CARE: CPT

## 2025-02-13 PROCEDURE — 97110 THERAPEUTIC EXERCISES: CPT

## 2025-02-13 PROCEDURE — 97530 THERAPEUTIC ACTIVITIES: CPT

## 2025-02-13 RX ADMIN — ASPIRIN 81 MG: 81 TABLET, COATED ORAL at 10:18

## 2025-02-13 RX ADMIN — ACETAMINOPHEN 650 MG: 325 TABLET ORAL at 05:09

## 2025-02-13 RX ADMIN — LISINOPRIL 30 MG: 20 TABLET ORAL at 10:19

## 2025-02-13 RX ADMIN — OXYCODONE HYDROCHLORIDE 10 MG: 5 TABLET ORAL at 15:50

## 2025-02-13 RX ADMIN — ACETAMINOPHEN 650 MG: 325 TABLET ORAL at 11:33

## 2025-02-13 RX ADMIN — VENLAFAXINE HYDROCHLORIDE 37.5 MG: 37.5 CAPSULE, EXTENDED RELEASE ORAL at 10:17

## 2025-02-13 RX ADMIN — OXYCODONE HYDROCHLORIDE 10 MG: 5 TABLET ORAL at 05:22

## 2025-02-13 RX ADMIN — VENLAFAXINE HYDROCHLORIDE 75 MG: 75 CAPSULE, EXTENDED RELEASE ORAL at 10:21

## 2025-02-13 RX ADMIN — BISACODYL 5 MG: 5 TABLET, COATED ORAL at 07:51

## 2025-02-13 RX ADMIN — SODIUM CHLORIDE, PRESERVATIVE FREE 10 ML: 5 INJECTION INTRAVENOUS at 10:21

## 2025-02-13 RX ADMIN — OXYCODONE HYDROCHLORIDE 10 MG: 5 TABLET ORAL at 10:18

## 2025-02-13 ASSESSMENT — PAIN SCALES - GENERAL
PAINLEVEL_OUTOF10: 7
PAINLEVEL_OUTOF10: 7
PAINLEVEL_OUTOF10: 6
PAINLEVEL_OUTOF10: 8
PAINLEVEL_OUTOF10: 2
PAINLEVEL_OUTOF10: 9
PAINLEVEL_OUTOF10: 8

## 2025-02-13 ASSESSMENT — PAIN DESCRIPTION - ORIENTATION: ORIENTATION: RIGHT;LEFT

## 2025-02-13 ASSESSMENT — PAIN SCALES - WONG BAKER: WONGBAKER_NUMERICALRESPONSE: HURTS A LITTLE BIT

## 2025-02-13 ASSESSMENT — PAIN DESCRIPTION - LOCATION: LOCATION: KNEE;LEG

## 2025-02-13 NOTE — FLOWSHEET NOTE
02/13/25 0515 02/13/25 0519 02/13/25 0520   Vital Signs   Pulse (!) 112 (!) 102 (!) 109   Heart Rate Source Monitor Monitor Monitor   /63 (!) 89/54 124/77   MAP (Calculated) 75 66 93   BP Method Automatic Automatic Automatic     Patient requesting to get up to commode to urinate this AM. RN and student to room. Patient got up to commode, denying dizziness and nausea, stating \"I feel okay.\" 0515 vitals reflect after the patient sat on commode. Patient back to bed without complaints of dizziness or nausea. 0519 vitals reflecting when patient got back to bed. After patient sat in bed for a minute, 0520 vitals reflect a recheck in which patient became more stable. Patient reported some pain with the activity, RN educated that this is to be expected post operatively, PRN pain medications given.

## 2025-02-13 NOTE — PROGRESS NOTES
Orthopedic Progress Note    Patient:  Sheyla Terrazas  YOB: 1980     44 y.o. female    Subjective:  Patient seen and examined this morning. No complaints or concerns. No issues overnight per nursing. Pain is well controlled on current regimen. Denies fever, HA, CP, SOB, N/V, dysuria, new numbness/tingling.  Patient is postop day 1 from her bilateral total knee replacement.  Patient was hypotensive and could not work with physical therapy yesterday after surgery.  She will attempt to work with physical therapy again today.    Vitals reviewed, afebrile    Objective:   Vitals:    02/12/25 0358   BP: 126/71   Pulse: 82   Resp: 16   Temp: 98.2 °F (36.8 °C)   SpO2: 99%     Gen: NAD, cooperative    Cardiovascular: Regular rate, no dependent edema  Respiratory: No acute respiratory distress, breathing comfortably    Orthopedic Exam  RLE:  Optifoam dressing is clean/dry/intact.  Appropriately tender to palpation.  Mild postoperative effusion noted.  Compartments are soft and compressible.  Patient is able to tolerate 0 to 90 degrees of flexion.  Motor function is intact to TA/GS/EHL/FHL motor complexes.  Sensation is intact light touch across the SPN/DPN/sural/saphenous nerve distributions.  Limb is warm and well-perfused.    LLE:  Optifoam dressing is clean/dry/intact.  Appropriately tender to palpation.  Mild postoperative effusion noted.  Compartments are soft and compressible.  Patient is able to tolerate 0 to 90 degrees of flexion.  Motor function is intact to TA/GS/EHL/FHL motor complexes.  Sensation is intact light touch across the SPN/DPN/sural/saphenous nerve distributions.  Limb is warm and well-perfused.      No results for input(s): \"WBC\", \"HGB\", \"HCT\", \"PLT\", \"INR\", \"NA\", \"K\", \"BUN\", \"CREATININE\", \"GLUCOSE\" in the last 72 hours.    Invalid input(s): \"PT\", \"PTT\"   Meds:   DVT ppx: ASA 81 Mg twice daily  See rec for complete list    Impression 44 y.o. female who is being seen 
           Orthopedic Progress Note    Patient:  Sheyla Terrazas  YOB: 1980     44 y.o. female    Subjective:  Patient seen and examined this morning. No complaints or concerns. No issues overnight per nursing. Pain is well controlled on current regimen. Denies fever, HA, CP, SOB, N/V, dysuria, new numbness/tingling.  Patient was unable to work with physical therapy yesterday due to orthostatic hypotension.  She should continue to try to work with PT prior to discharge.    Vitals reviewed, afebrile    Objective:   Vitals:    02/13/25 0717   BP: (!) 142/80   Pulse: 93   Resp: 17   Temp: 97.9 °F (36.6 °C)   SpO2: 98%     Gen: NAD, cooperative    Cardiovascular: Regular rate   Respiratory: No acute respiratory distress, breathing comfortably    Orthopedic Exam  RLE:  Optifoam dressing is clean/dry/intact.  Appropriately tender to palpation.  Mild postoperative effusion noted.  Compartments are soft and compressible. Motor function is intact to TA/GS/EHL/FHL motor complexes.  Sensation is intact light touch across the SPN/DPN/sural/saphenous nerve distributions.  Limb is warm and well-perfused.     LLE:  Optifoam dressing is clean/dry/intact.  Appropriately tender to palpation.  Mild postoperative effusion noted.  Compartments are soft and compressible. Motor function is intact to TA/GS/EHL/FHL motor complexes.  Sensation is intact light touch across the SPN/DPN/sural/saphenous nerve distributions.  Limb is warm and well-perfused.    Recent Labs     02/12/25  0610   WBC 17.2*   HGB 11.4*   HCT 33.8*      *   K 4.3   BUN 13   CREATININE 0.9   GLUCOSE 154*      Meds:   DVT ppx: ASA 81mg BID  See rec for complete list    Impression 44 y.o. female being seen for:    -Bilateral knee osteoarthritis     Procedure, DOS: 2/11/2025  -Bilateral total knee arthroplasty     Plan  - No further plans for orthopedic intervention at this time  - Optifoam on bilateral lower extremities over the knee. Do not 
  Physician Progress Note      PATIENT:               FALLON WALLIS  CSN #:                  [id number]  :                       1980  ADMIT DATE:       2025 6:16 AM  DISCH DATE:  RESPONDING  PROVIDER #:        GUILLAUME SEGOVIA          QUERY TEXT:    Pt admitted for Revision right total knee arthroplasty, left total knee   arthroplasty. EBL of 150.  hemoglobin 14.6,  11.4. If possible, please   document in the progress notes and discharge summary if you are evaluating   and/or treating any of the following:    The medical record reflects the following:  Risk Factors: 43yo, recent surgery with EBL of 150, IV fluids  Clinical Indicators:  hemoglobin 14.6,  11.4  Treatment: labs    Thank you,  Elisa Mondragon (Klaus), RN BSN  Clinical   Options provided:  -- Acute blood loss anemia  -- Postoperative acute blood loss anemia  -- Dilutional anemia  -- Other - I will add my own diagnosis  -- Disagree - Not applicable / Not valid  -- Disagree - Clinically unable to determine / Unknown  -- Refer to Clinical Documentation Reviewer    PROVIDER RESPONSE TEXT:    This patient has acute blood loss anemia.    Query created by: Elisa Biggs on 2025 4:11 PM      Electronically signed by:  GUILLAUME SEGOVIA 2025 4:54 PM          
CLINICAL PHARMACY NOTE: MEDS TO BEDS    Total # of Prescriptions Filled: 2   The following medications were delivered to the patient:  Percocet 5-325  Aspirin 81mg  Stool softener 100mg    Additional Documentation:  
Occupational Therapy  Occupational Therapy Re-Evaluation  Facility/Department: [facility] MED SURG   Patient Name: Sheyla Terrazas        MRN: [id number]    : 1980    Date of Service: 2025    RN reports patient is medically stable for therapy treatment this date.    Chart reviewed prior to treatment and patient is agreeable for therapy.  All lines intact and patient positioned comfortably at end of treatment.  All patient needs addressed prior to ending therapy session.      Discharge Recommendations  Discharge Recommendations: Patient would benefit from continued therapy after discharge  Due to recent hospitalization and medical condition, pt would benefit from additional intermittent skilled therapy at time of discharge.  Please refer to the AM-PAC score for current functional status.     OT Equipment Recommendations  Equipment Needed: Yes  Mobility Devices: ADL Assistive Devices  ADL Assistive Devices: Long-handled Shoe Horn, Long-handled Sponge, Reacher, Sock-Aid Hard    Procedure(s):  Left total knee arthroplasty, revision right total knee arthroplasty.      Past Medical History:  has a past medical history of Allergic rhinitis, Anxiety, Depression, Dysphagia, Fundic gland polyposis of stomach, Gastritis, GERD (gastroesophageal reflux disease), Hypertension, Menopausal symptoms, Osteoarthritis, Prolonged emergence from general anesthesia, Schatzki's ring, Unspecified sinusitis (chronic), and Wears contact lenses.  Past Surgical History:  has a past surgical history that includes Upper gastrointestinal endoscopy (); Upper gastrointestinal endoscopy (10/31/2016); Knee arthroscopy (Right, 10/31/2017); knee surgery (Right, 2018); pr cptr-asst surgical navigation image-less (Right, 2018); US BREAST BIOPSY W LOC DEVICE 1ST LESION LEFT (Left, 2021); and Knee Arthroplasty.    Assessment  Performance deficits / Impairments: Decreased functional mobility ;Decreased ADL status;Decreased safe 
Occupational Therapy  Occupational Therapy Treatment Note  Facility/Department: [facility] MED SURG   Patient Name: Sheyla Terrazas        MRN: [id number]    : 1980    Date of Service: 2025    RN reports patient is medically stable for therapy treatment this date.    Chart reviewed prior to treatment and patient is agreeable for therapy.  All lines intact and patient positioned comfortably at end of treatment.  All patient needs addressed prior to ending therapy session.      Discharge Recommendations  Discharge Recommendations: Patient would benefit from continued therapy after discharge  Due to recent hospitalization and medical condition, pt would benefit from additional intermittent skilled therapy at time of discharge.  Please refer to the AM-PAC score for current functional status.     OT Equipment Recommendations  Equipment Needed: Yes  Mobility Devices: ADL Assistive Devices  ADL Assistive Devices: Long-handled Shoe Horn, Long-handled Sponge, Reacher, Sock-Aid Hard    No chief complaint on file.    Past Medical History:  has a past medical history of Allergic rhinitis, Anxiety, Depression, Dysphagia, Fundic gland polyposis of stomach, Gastritis, GERD (gastroesophageal reflux disease), Hypertension, Menopausal symptoms, Osteoarthritis, Prolonged emergence from general anesthesia, Schatzki's ring, Unspecified sinusitis (chronic), and Wears contact lenses.  Past Surgical History:  has a past surgical history that includes Upper gastrointestinal endoscopy (); Upper gastrointestinal endoscopy (10/31/2016); Knee arthroscopy (Right, 10/31/2017); knee surgery (Right, 2018); pr cptr-asst surgical navigation image-less (Right, 2018); US BREAST BIOPSY W LOC DEVICE 1ST LESION LEFT (Left, 2021); and Knee Arthroplasty.    Assessment  Performance deficits / Impairments: Decreased functional mobility ;Decreased ADL status;Decreased safe awareness;Decreased cognition;Decreased balance;Decreased 
Orthopedic Coordinator Note    Patient s/p left total Knee replacement on 02/11/2025 WITH RTKA REVISION.    The following appointments are currently scheduled:    Post-op with surgeon 02/26/2025 AT 1300 IN [place] WITH DR. TELLEZ. REFER TO AVS.    Physical Therapy OPPT AT [facility] 02/14/2025 AT 1245.    PT HAS A FWW.    DVT Prophylaxis: 81MG ASA BID X 6 WEEKS POSTOP.    PT CAME TO JOINT CLASS 01/22/2025 AND HAD RT PARTIAL KNEE REPLACEMENT 06/12/2018.      Any questions please contact Jodi Shearer RN, MSN  [phone]      Electronically signed by: JODI SHEARER RN on 2/11/2025 at 8:31 AM     
Patient reporting need to use the restroom. RN to bedside, pt requesting to get up. RN found additional support and walker to help patient. Patient dangling at bedside became dizzy, nauseated, and complaints of feeling hot. Pt returned to a semi-fowlers position. BP rechecked and determined stable, see flowsheets. Pt agreeable to bed pan at this time as she does not feel as though she can get up at this time.  
Physical Therapy  Facility/Department: [facility] MED SURG   Physical Therapy Initial Evaluation    Patient Name: Sheyla Terrazas        MRN: [id number]    : 1980    Date of Service: 2025    Primary OA of B knees    Past Medical History:  has a past medical history of Allergic rhinitis, Anxiety, Depression, Dysphagia, Fundic gland polyposis of stomach, Gastritis, GERD (gastroesophageal reflux disease), Hypertension, Menopausal symptoms, Osteoarthritis, Prolonged emergence from general anesthesia, Schatzki's ring, Unspecified sinusitis (chronic), and Wears contact lenses.  Past Surgical History:  has a past surgical history that includes Upper gastrointestinal endoscopy (); Upper gastrointestinal endoscopy (10/31/2016); Knee arthroscopy (Right, 10/31/2017); knee surgery (Right, 2018); pr cptr-asst surgical navigation image-less (Right, 2018); US BREAST BIOPSY W LOC DEVICE 1ST LESION LEFT (Left, 2021); and Knee Arthroplasty.    Discharge Recommendations  Discharge Recommendations: Patient would benefit from continued therapy after discharge       Assessment  Body Structures, Functions, Activity Limitations Requiring Skilled Therapeutic Intervention: Decreased functional mobility , Decreased tolerance to work activity, Decreased balance, Decreased endurance, Decreased safe awareness, Decreased strength    Assessment: Returned for 3rd session with patient, demonstrates bed mobility with SBA, sit<>stand patient required MOD A, unable to attempt ambulation due to patient becoming very clammy in standing and BP dropped to 83/52. Will progress as able.    Therapy Prognosis: Good  Decision Making: Medium Complexity  Requires PT Follow-Up: Yes  Activity Tolerance  Activity Tolerance: Other (comment), Treatment limited secondary to medical complications  Activity Tolerance Comments: +orthostatic hypotension  Safety Devices  Type of Devices: All fall risk precautions in place, Left in bed, Nurse 
Physical Therapy  Facility/Department: [facility] MED SURG   Physical Therapy ReEvaluation    Patient Name: Sheyla Terrazas        MRN: [id number]    : 1980    Date of Service: 2025    HPI : Left TKA, R TKR 25          Complicated by orthostatic hypotension since surgery    Past Medical History:  has a past medical history of Allergic rhinitis, Anxiety, Depression, Dysphagia, Fundic gland polyposis of stomach, Gastritis, GERD (gastroesophageal reflux disease), Hypertension, Menopausal symptoms, Osteoarthritis, Prolonged emergence from general anesthesia, Schatzki's ring, Unspecified sinusitis (chronic), and Wears contact lenses.  Past Surgical History:  has a past surgical history that includes Upper gastrointestinal endoscopy (); Upper gastrointestinal endoscopy (10/31/2016); Knee arthroscopy (Right, 10/31/2017); knee surgery (Right, 2018); pr cptr-asst surgical navigation image-less (Right, 2018); US BREAST BIOPSY W LOC DEVICE 1ST LESION LEFT (Left, 2021); and Knee Arthroplasty.    Discharge Recommendations  Discharge Recommendations: Patient would benefit from continued therapy after discharge. .Pt presenting with new musculoskeletal dysfunction and would benefit from additional therapy at time of discharge.  Please refer to the AM-PAC score for current functional status.         Assessment  Body Structures, Functions, Activity Limitations Requiring Skilled Therapeutic Intervention: Decreased functional mobility , Decreased tolerance to work activity, Decreased balance, Decreased endurance, Decreased safe awareness, Decreased strength, Decreased ROM, Decreased posture  Assessment: Patient continues to be positive for orthostatic hypotension, but asymptomatic at this time. Patient able to amb 15 feet x 2 with RW and min assist, min assist x 2 for sit to stand from toilet with RW due to limited knee flexion ROM. Overall patient improved from last visit. Recommend staff contiue to have 
Pt admitted to room. Oriented to room, call light and bed mechanics. Side rails up x2. Call light within reach. Orders reviewed.    
Reviewed discharge instructions with patient and . Verbalized understanding. Patient discharged with all belongings and transported via WC with  to transport home. Scripts sent with patient.   
light within reach  Restraints  Restraints Initially in Place: No    AM-PAC  AM-[facility] Basic Mobility - Inpatient   How much help is needed turning from your back to your side while in a flat bed without using bedrails?: None  How much help is needed moving from lying on your back to sitting on the side of a flat bed without using bedrails?: None  How much help is needed moving to and from a bed to a chair?: Total  How much help is needed standing up from a chair using your arms?: Total  How much help is needed walking in hospital room?: Total  How much help is needed climbing 3-5 steps with a railing?: Total  AM-PAC Inpatient Mobility Raw Score : 12  AM-PAC Inpatient T-Scale Score : 35.33  Mobility Inpatient CMS 0-100% Score: 68.66  Mobility Inpatient CMS G-Code Modifier : CL    Restrictions/Precautions  Restrictions/Precautions  Restrictions/Precautions: Weight Bearing, General Precautions  Lower Extremity Weight Bearing Restrictions  Right Lower Extremity Weight Bearing: Weight Bearing As Tolerated  Left Lower Extremity Weight Bearing: Weight Bearing As Tolerated  Position Activity Restriction  Other Position/Activity Restrictions: R UE IV;  Telemetry; s/p L total knee and R revision total knee (grewal)       Subjective  General  Chart Reviewed: Yes  Patient assessed for rehabilitation services?: Yes  Family/Caregiver Present: Yes  Follows Commands: Within Functional Limits  General  General Comments: RN states patient appropriate for therapy  Subjective  Subjective: patient agreeable to work with therapy, states attempted to get OOB multiple times during the night but BP continued to drop and patient would become symptomatic       Home Setup/Prior Level of Function  Social/Functional History  Lives With: Spouse, Son  Type of Home: House  Home Layout: Two level, 1/2 bath on main level, Bed/Bath upstairs  Home Access: Stairs to enter without rails  Entrance Stairs - Number of Steps: 2  Bathroom Shower/Tub: Walk-in 
much help is needed climbing 3-5 steps with a railing?: A Little  AM-[facility] Inpatient Mobility Raw Score : 20  AM-PAC Inpatient T-Scale Score : 47.67  Mobility Inpatient CMS 0-100% Score: 35.83  Mobility Inpatient CMS G-Code Modifier : CJ    Restrictions/Precautions  Restrictions/Precautions  Restrictions/Precautions: Weight Bearing, General Precautions  Lower Extremity Weight Bearing Restrictions  Right Lower Extremity Weight Bearing: Weight Bearing As Tolerated  Left Lower Extremity Weight Bearing: Weight Bearing As Tolerated  Position Activity Restriction  Other Position/Activity Restrictions: R UE IV;  s/p L total knee and R revision total knee (grewal), Ortho static hypotension, B thigh high FCO hose       Subjective  General  Chart Reviewed: Yes  Patient assessed for rehabilitation services?: Yes  Response To Previous Treatment: Patient with no complaints from previous session.  Family/Caregiver Present: Yes ()  Diagnosis: L TKA, R TKR  Follows Commands: Within Functional Limits  General  General Comments: Nick RN reports patient medically appropriate for PT.  Subjective  Subjective: Patient very pleasant and agreeable to PT.    Objective  Orientation  Overall Orientation Status: Within Normal Limits  Cognition  Overall Cognitive Status: WNL    Observation/Palpation  Posture: Good  Observation: Patient in bed upon arrival  Edema: B LE    AROM RLE (degrees)  RLE General AROM: Hip and knee WFL, knee 5-85  AROM LLE (degrees)  LLE General AROM: Hip and knee WFL, knee 5-80  AROM RUE (degrees)  RUE General AROM: see OT assessment  AROM LUE (degrees)  LUE General AROM: See OT Assessment    Mobility   Bed mobility  Rolling to Left: Independent  Rolling to Right: Independent  Supine to Sit: Independent  Sit to Supine: Independent  Scooting: Independent  Bed Mobility Comments: Patient performed bed mobility with HOB flat and no bed rails. Patient reports no dizziness with supine to sit. Required increased time to 
knee ROM to lacking less than 5 degrees ext and >90 degrees flexion  Short Term Goal 3: PT to re-eval for transfers/gait when appropriate.  Short Term Goal 4: Patient will be indep with total knee HEP following handout.  Additional Goals?: Yes    Minutes  PT Individual Minutes  Time In: 1632  Time Out: 1654  Minutes: 22   Billable treatment time: 14 minutes    Electronically signed by MAXIME ALMARAZ, PT on 2/11/25 at 5:08 PM EST    
  Cognition   Orientation  Overall Orientation Status: Within Functional Limits  Cognition  Overall Cognitive Status: WFL    Objective  Temp: 97 °F (36.1 °C)  Pulse: 75  Respirations: 15  SpO2: 100 %  O2 Device: None (Room air)  BP: 131/88  MAP (Calculated): 102     Observation/Palpation  Observation: B knee dressing intact; R proximal knee dressing observed scant bloody drainage; B FCO hose donned prior to mobility    Strength RLE  Comment: AAROM heel slide to ~40degrees knee flexion; Quad set (+); SLR (-)  Strength LLE  Comment: AAROM heel slide to ~40degrees knee flexion; Quad set (+);  SLR (-)         Bed mobility  Supine to Sit: 2 Person assistance;Substantial/Maximal assistance  Sit to Supine: 2 Person assistance;Substantial/Maximal assistance  Bed Mobility Comments: HOB elevated; VC to initiate task and promote proper sequencing. MAX A with advancing LE to EOB.  On initial sit, patient with immediate c/o increased pain and patient closed her eyes with c/o nausea.  BP assessed 140/88mmHg. Patient requested to return to supine. Max A x2 to return to supine. Extra time to allow patient to rest and patient agreeable to second attempt sitting EOB. On second attempt, max A to advance LE to EOB and assist with trunk to achieve partial sit. BP assessed 129/80mmHg. Patient again with c/o increase pain and requested to return to supine with max A x2.  Patient unable to tolerate sitting EOB at initial evaluation.    Transfers  Comment: DNT transfers secondary to patient c/o high levels of pain (RN notified)  Ambulation  Comments: Unsafe to attempt gait due to patient c/o high levels of pain.        A/AROM Exercises: AAROM heel slides; quad sets and SLR  Circulation/Endurance Exercises: ankle pumps      Edu provided on complications resulting from immobility and decreased participation such as increased risk for blood clots, pneumonia, constipation, muscle atrophy, and decreased independence. Pt verbalized good 
care.  Education Method: Verbal  Barriers to Learning: None  Education Outcome: Verbalized understanding;Demonstrated understanding      Goals  Patient Goals   Patient goals : To go home!  Short Term Goals  Time Frame for Short Term Goals: By discharge, pt to demo  Short Term Goal 1: ADL transfers and functional mobility to Mod I with use of AD and proper .  Short Term Goal 2: bed mobility to Mod I with use of bedrails as needed.  Short Term Goal 3: UB ADLs to SBA and LB ADLs to Min A with use of AD/AE as needed.  Short Term Goal 4: toileting to SBA with use of AD/grab bars as needed.  Short Term Goal 5: Pt/caregiver to be I with fall prevention edu, EC/WS tech, recommendations for discharge/AE, surgical precautions, FCO hose wear/purpose, safe car transfers with use of handouts as needed.      Plan  Occupational Therapy Plan  Times Per Week: 5-6x/wk 1x/day as indigo  Current Treatment Recommendations: Strengthening, Balance training, Functional mobility training, Endurance training, Safety education & training, Equipment evaluation, education, & procurement, Patient/Caregiver education & training, Self-Care / ADL, Home management training, Pain management    AM-PAC Daily Activities Inpatient  AM-PAC Daily Activity - Inpatient   How much help is needed for putting on and taking off regular lower body clothing?: Total  How much help is needed for bathing (which includes washing, rinsing, drying)?: A Lot  How much help is needed for toileting (which includes using toilet, bedpan, or urinal)?: A Lot  How much help is needed for putting on and taking off regular upper body clothing?: A Little  How much help is needed for taking care of personal grooming?: A Little  How much help for eating meals?: None  AM-PAC Inpatient Daily Activity Raw Score: 15  AM-PAC Inpatient ADL T-Scale Score : 34.69  ADL Inpatient CMS 0-100% Score: 56.46  ADL Inpatient CMS G-Code Modifier : CK    Minutes  OT Individual Minutes  Time In: 
negotiation training with Tameka. Pt/spouse with no additional questions/concerns at session end. Pt left with all needs met, call light in reach & bed alarm armed.       Electronically signed by Radha Pires OT on 2/13/25 at 11:39 AM EST

## 2025-02-13 NOTE — PLAN OF CARE
PROTOCOLS  NURSING IMPLEMENTED    TOTAL JOINT DVT/PE  VENOUS THROMBOEMBOLISM PROPHYLAXIS  (Nursing Automatically Implement)    Sheyla Terrazas  [id number]  [unfilled]  2/10/25    YES DVT RISK FACTOR SCORE YES MAJOR BLEEDING RISK FACTORS SCORE     [] 50 years old or greater (1)   [] Hx. Easy Bleeding (1)      [] Heart failure (2)   [] NSAID Use in Last 5 Days (2)      [] Varicose veins - Hx. (1)   [] Gastrointestinal or Genitourinary bleeding in Last 14 Days (2)      [] Myocardial Infarction - Hx. (1)         [] Cancer - Hx. (2)         [] Atrial fibrillation - Hx. (1)         [] Ischemic Stroke - Hx. (1)         [] Diabetes Mellitus - Hx. (1)         [] Previous DVT/PE - Hx. (2)         [] Hormone Replacement Therapy (1)         [x] Obesity (1)         [] Paralysis (1)         [] Pregnancy (1)         [] Smoking (1)                   [] Thromophilia (1)   []   Mild to Moderate Bleeding (2)      [] Total Hip Arthroplasty (1)   [] Active Bleeding (4)      [] Family history of PE or DVT? (4) (Consider the following labs to test for presence of inhibitor deficiency state:) Factor V Leiden, Prothrombin Gene Mutation, Protein S Deficiency, Protien C Deficiency, Antithrombin Deficiency   [] Malignant Hypertension (2)        [] Thrombocytopenia 20k to 100k (2)        [] Thrombocytopenia less than 20k (4)        [] Bleeding Diathesis (4)        [] \"Bloody Stick\" Epidural or Spinal (2)     TOTAL DVT SCORE   TOTAL BLEEDING SCORE      [x] CLASS A   Standard Risk DVT (0-3)    [x] CLASS X Standard Risk Bleeding (0-4)      [] CLASS B Elevated Risk DVT (greater than 3)    [] CLASS Y High Risk Bleeding (greater than 4)     FINAL MATRIX (e.g. AY)       *If allergic to ASA use Warfarin  *BY patient consider no treatment  **Consider venous filter with high risk PE  **If on Coumadin pre-op, then restart night of surgery      [x]  DVT Prophylaxis: Class AX, AY    Ecotrin 81 mg by mouth BID starting day of surgery for 6 weeks for all total 
  Problem: Discharge Planning  Goal: Discharge to home or other facility with appropriate resources  Outcome: Progressing  Flowsheets  Taken 2/13/2025 1427  Discharge to home or other facility with appropriate resources:   Identify barriers to discharge with patient and caregiver   Identify discharge learning needs (meds, wound care, etc)   Arrange for needed discharge resources and transportation as appropriate  Taken 2/13/2025 2690  Discharge to home or other facility with appropriate resources:   Identify barriers to discharge with patient and caregiver   Arrange for needed discharge resources and transportation as appropriate  Note: Patient admitted with bilateral total knee arthoplasty. Patient on RA, pt/ot ordered. Patient alert and orientated and calls out appropriately. Patient ambulating and tolerating well.      
  Problem: Discharge Planning  Goal: Discharge to home or other facility with appropriate resources  Outcome: Progressing  Flowsheets  Taken 2/13/2025 1427  Discharge to home or other facility with appropriate resources:   Identify barriers to discharge with patient and caregiver   Identify discharge learning needs (meds, wound care, etc)   Arrange for needed discharge resources and transportation as appropriate  Taken 2/13/2025 4089  Discharge to home or other facility with appropriate resources:   Identify barriers to discharge with patient and caregiver   Arrange for needed discharge resources and transportation as appropriate  Note: Patient admitted with bilateral total knee arthoplasty. Patient on RA, pt/ot ordered. Patient alert and orientated and calls out appropriately. Patient ambulating and tolerating well.      
  Problem: Discharge Planning  Goal: Discharge to home or other facility with appropriate resources  Outcome: Progressing  Flowsheets (Taken 2/11/2025 2140 by Tata Clemens, RN)  Discharge to home or other facility with appropriate resources:   Identify barriers to discharge with patient and caregiver   Arrange for needed discharge resources and transportation as appropriate   Identify discharge learning needs (meds, wound care, etc)     Problem: Pain  Goal: Verbalizes/displays adequate comfort level or baseline comfort level  Outcome: Progressing  Flowsheets (Taken 2/12/2025 1639)  Verbalizes/displays adequate comfort level or baseline comfort level:   Encourage patient to monitor pain and request assistance   Assess pain using appropriate pain scale   Administer analgesics based on type and severity of pain and evaluate response     Problem: ABCDS Injury Assessment  Goal: Absence of physical injury  Outcome: Progressing  Flowsheets (Taken 2/12/2025 1639)  Absence of Physical Injury: Implement safety measures based on patient assessment     Problem: Safety - Adult  Goal: Free from fall injury  Outcome: Progressing  Flowsheets (Taken 2/12/2025 1639)  Free From Fall Injury: Instruct family/caregiver on patient safety     Problem: Neurosensory - Adult  Goal: Achieves maximal functionality and self care  Outcome: Progressing  Flowsheets (Taken 2/11/2025 2140 by Tata Clemens, RN)  Achieves maximal functionality and self care: Encourage and assist patient to increase activity and self care with guidance from physical therapy/occupational therapy     Problem: Skin/Tissue Integrity - Adult  Goal: Incisions, wounds, or drain sites healing without S/S of infection  Outcome: Progressing  Flowsheets (Taken 2/11/2025 2236 by Tata Clemens, RN)  Incisions, Wounds, or Drain Sites Healing Without Sign and Symptoms of Infection: TWICE DAILY: Assess and document dressing/incision, wound bed, drain sites and surrounding 
  Problem: Discharge Planning  Goal: Discharge to home or other facility with appropriate resources  Outcome: Progressing  Flowsheets (Taken 2/11/2025 2140)  Discharge to home or other facility with appropriate resources:   Identify barriers to discharge with patient and caregiver   Arrange for needed discharge resources and transportation as appropriate   Identify discharge learning needs (meds, wound care, etc)     Problem: Pain  Goal: Verbalizes/displays adequate comfort level or baseline comfort level  Outcome: Progressing     Problem: ABCDS Injury Assessment  Goal: Absence of physical injury  Outcome: Progressing     Problem: Safety - Adult  Goal: Free from fall injury  Outcome: Progressing     Problem: Neurosensory - Adult  Goal: Achieves maximal functionality and self care  Outcome: Progressing  Flowsheets (Taken 2/11/2025 2140)  Achieves maximal functionality and self care: Encourage and assist patient to increase activity and self care with guidance from physical therapy/occupational therapy     Problem: Skin/Tissue Integrity - Adult  Goal: Incisions, wounds, or drain sites healing without S/S of infection  Outcome: Progressing  Flowsheets  Taken 2/11/2025 2236  Incisions, Wounds, or Drain Sites Healing Without Sign and Symptoms of Infection: TWICE DAILY: Assess and document dressing/incision, wound bed, drain sites and surrounding tissue  Taken 2/11/2025 2140  Incisions, Wounds, or Drain Sites Healing Without Sign and Symptoms of Infection: TWICE DAILY: Assess and document dressing/incision, wound bed, drain sites and surrounding tissue     Problem: Musculoskeletal - Adult  Goal: Return mobility to safest level of function  Outcome: Progressing  Flowsheets (Taken 2/11/2025 2140)  Return Mobility to Safest Level of Function:   Assess patient stability and activity tolerance for standing, transferring and ambulating with or without assistive devices   Assist with transfers and ambulation using safe patient 
Neurosensory - Adult  Goal: Achieves maximal functionality and self care  2/12/2025 2227 by Tata Clemens RN  Outcome: Progressing  2/12/2025 1639 by Oralia Mina RN  Outcome: Progressing  Flowsheets (Taken 2/11/2025 2140 by Tata Clemens, RN)  Achieves maximal functionality and self care: Encourage and assist patient to increase activity and self care with guidance from physical therapy/occupational therapy     Problem: Skin/Tissue Integrity - Adult  Goal: Incisions, wounds, or drain sites healing without S/S of infection  2/12/2025 2227 by Tata Clemens RN  Outcome: Progressing  Flowsheets  Taken 2/12/2025 2227  Incisions, Wounds, or Drain Sites Healing Without Sign and Symptoms of Infection: TWICE DAILY: Assess and document dressing/incision, wound bed, drain sites and surrounding tissue  Taken 2/12/2025 2017  Incisions, Wounds, or Drain Sites Healing Without Sign and Symptoms of Infection: TWICE DAILY: Assess and document dressing/incision, wound bed, drain sites and surrounding tissue  2/12/2025 1639 by Oralia Mina RN  Outcome: Progressing  Flowsheets (Taken 2/11/2025 2236 by Tata Clemens RN)  Incisions, Wounds, or Drain Sites Healing Without Sign and Symptoms of Infection: TWICE DAILY: Assess and document dressing/incision, wound bed, drain sites and surrounding tissue     Problem: Musculoskeletal - Adult  Goal: Return mobility to safest level of function  2/12/2025 2227 by Tata Clemens RN  Outcome: Progressing  Flowsheets (Taken 2/12/2025 2017)  Return Mobility to Safest Level of Function:   Assess patient stability and activity tolerance for standing, transferring and ambulating with or without assistive devices   Assist with transfers and ambulation using safe patient handling equipment as needed   Ensure adequate protection for wounds/incisions during mobilization   Obtain physical therapy/occupational therapy consults as needed   Instruct patient/family in ordered activity

## 2025-02-14 ENCOUNTER — HOSPITAL ENCOUNTER (OUTPATIENT)
Dept: PHYSICAL THERAPY | Age: 45
Setting detail: THERAPIES SERIES
Discharge: HOME | End: 2025-02-14
Attending: ORTHOPAEDIC SURGERY
Payer: COMMERCIAL

## 2025-02-14 ENCOUNTER — CARE COORDINATION (OUTPATIENT)
Dept: OTHER | Facility: CLINIC | Age: 45
End: 2025-02-14

## 2025-02-14 PROCEDURE — 97162 PT EVAL MOD COMPLEX 30 MIN: CPT

## 2025-02-14 PROCEDURE — 97016 VASOPNEUMATIC DEVICE THERAPY: CPT

## 2025-02-14 RX ORDER — ACETAMINOPHEN 500 MG
1000 TABLET ORAL EVERY 6 HOURS PRN
COMMUNITY

## 2025-02-14 NOTE — CARE COORDINATION
2/26/2025 1:00 PM Mo Siddiqi,  Orthopedic Surgery [phone]    1/23/2026 10:00 AM Irais Fallon APRN - [facility] Obstetrics and Gynecology [phone]            Care Transition Nurse provided contact information.  Plan for follow-up call in 6-10 days based on severity of symptoms and risk factors.  Plan for next call: symptom management-pain, s/s infection, mental health  self management-blood pressure  follow-up appointment-engaged with PT  medication management-Pain control      Ondina Welch RN

## 2025-02-14 NOTE — CONSULTS
[facility] [facility]   Outpatient Rehabilitation & Therapy  [address]         Phone: [phone]       Fax: [phone]    Physical Therapy Lower Extremity Evaluation    Date:  2025  Patient: Sheyla Terrazas  : 1980  MRN: [id number]  Physician: ***     Insurance: ***  Medical Diagnosis: ***  Rehab Codes: ***  Onset date: ***  Next Dr's appt.: ***  Visit Count: ***/***   Cancel/No Show: ***/***    Subjective:   CC: (B) knee pain   HPI: (2025) Patient is a 44 year old female who presented with (B) knee pain due to undergoing a (R) TKA and (L) TKA on 2025 @ [facility]. Discharged to home yesterday due to complications with her blood pressure.      Past Medical History:   Diagnosis Date    Allergic rhinitis     Anxiety     Depression     Dysphagia     Fundic gland polyposis of stomach     Gastritis     GERD (gastroesophageal reflux disease)     Hypertension     Menopausal symptoms     Osteoarthritis     Prolonged emergence from general anesthesia     Nina's ring 10/31/2016    Unspecified sinusitis (chronic)     Wears contact lenses       Past Surgical History:   Procedure Laterality Date    KNEE ARTHROPLASTY      KNEE ARTHROSCOPY Right 10/31/2017    KNEE SURGERY Right 2018    Partial Knee Replacement    [place] CPTR-ASST SURGICAL NAVIGATION IMAGE-LESS Right 2018    NAVIO ROBOTIC PARTIAL KNEE ARTHROPLASTY  (SMITH&NEPHEW, FEMORAL NERVE AND POPLITEAL BLOCK PRE-OP, 3080 TABLE)  *ADVANCED performed by Mo Siddiqi DO at [facility] OR    TOTAL KNEE ARTHROPLASTY Bilateral 2025    KNEE TOTAL ARTHROPLASTY BILATERAL WITH REMOVAL OF RIGHT UNI KNEE performed by Mo Siddiqi DO at [facility] OR    UPPER GASTROINTESTINAL ENDOSCOPY      UPPER GASTROINTESTINAL ENDOSCOPY  10/31/2016    tesha; gastritis; fundic gland polyp    US BREAST BIOPSY W LOC DEVICE 1ST LESION LEFT Left 2021    US BREAST NEEDLE BIOPSY LEFT

## 2025-02-18 ENCOUNTER — HOSPITAL ENCOUNTER (OUTPATIENT)
Dept: PHYSICAL THERAPY | Age: 45
Setting detail: THERAPIES SERIES
Discharge: HOME | End: 2025-02-18
Attending: ORTHOPAEDIC SURGERY
Payer: COMMERCIAL

## 2025-02-18 PROCEDURE — 97110 THERAPEUTIC EXERCISES: CPT

## 2025-02-18 PROCEDURE — 97016 VASOPNEUMATIC DEVICE THERAPY: CPT

## 2025-02-18 NOTE — FLOWSHEET NOTE
[facility]   Outpatient Rehabilitation & Therapy  [address]  P: [phone]   F: [phone]    Physical Therapy Daily Treatment Note      Date:  2025  Patient Name:  Sheyla Terrazas    :  1980  MRN: [id number]  Physician: Mo Siddiqi DO                          Insurance: [facility] - based on medical necessity. No hard max (authorization after 30  visit through [facility] Cares)   Medical Diagnosis: Primary OA of knee(s) (M17.0), Status post surgery (Z98.890)            Clinical Diagnosis: (R) knee pain (M25.561) (L) knee pain (M25.652) decreased knee range of motion (M25.661, M25.662)  Onset date: 2025                     Next 's appt.: TBD  Visit Count:                                 Cancel/No Show: 0/0    Subjective:    Patient stated that her activity level has improved since her evaluation. She was able to go upstairs and take a shower along with sleeping in her bed with little difficulty. She stated that her pain levels have improved since her evaluation. Able to perform her home exercise program daily without difficulty.     Over 24 hours  Pain  Pain:  [x] Yes   [] No       Location: (R) knee joint   Pain Rating: (0-10 scale) 9/10 with pain medication   Worst: 9/10 with pain medication   Best: 6/10 with pain medication   Descriptors: constant, sharp, aching, burning, throbbing, stiffness    Pain  Pain:  [x] Yes   [] No       Location: (L) knee joint   Pain Rating: (0-10 scale) 5/10 with pain medication   Worst: 7/10 with pain medication  Best: 3/10 with pain medication  Descriptors: constant, sharp, aching, burning, throbbing, stiffness    Objective:  Modalities: Vasopneumatic compression x 15 minutes to both knees - low compression, 34 degrees,knee sleeves (semi-reclined position)  Precautions: anxiety, depression   Exercises:  Exercise Reps/ Time Weight/ Level Comments   Supine Short Arc Quad  10 reps  Small Bolster     Side lying Hip Abduction  10 reps

## 2025-02-21 ENCOUNTER — CARE COORDINATION (OUTPATIENT)
Dept: OTHER | Facility: CLINIC | Age: 45
End: 2025-02-21

## 2025-02-21 ENCOUNTER — HOSPITAL ENCOUNTER (OUTPATIENT)
Dept: PHYSICAL THERAPY | Age: 45
Setting detail: THERAPIES SERIES
Discharge: HOME | End: 2025-02-21
Attending: ORTHOPAEDIC SURGERY
Payer: COMMERCIAL

## 2025-02-21 PROCEDURE — 97016 VASOPNEUMATIC DEVICE THERAPY: CPT

## 2025-02-21 PROCEDURE — 97110 THERAPEUTIC EXERCISES: CPT

## 2025-02-21 PROCEDURE — 97140 MANUAL THERAPY 1/> REGIONS: CPT

## 2025-02-21 NOTE — FLOWSHEET NOTE
[facility]   Outpatient Rehabilitation & Therapy  [address]  P: [phone]   F: [phone]    Physical Therapy Daily Treatment Note      Date:  2025  Patient Name:  Sheyla Terrazas    :  1980  MRN: [id number]  Physician: Mo Siddiqi DO                          Insurance: [facility] - based on medical necessity. No hard max (authorization after 30  visit through [facility] Cares)   Medical Diagnosis: Primary OA of knee(s) (M17.0), Status post surgery (Z98.890)            Clinical Diagnosis: (R) knee pain (M25.561) (L) knee pain (M25.652) decreased knee range of motion (M25.661, M25.662)  Onset date: 2025                     Next 's appt.: TBD  Visit Count:                                 Cancel/No Show: 0/0    Subjective:    Patient stated that she is \"stiff\" today.  Rated Lavell knee pain 6-7/10 this morning.  Patient noted has 15 steps she completed 1x daily to upstairs bedroom.  Currently is ascending with L LE and descending with R LE.  Compliant with HEP and icing as tolerated.    Over 24 hours  Pain  Pain:  [x] Yes   [] No       Location: (R) knee joint   Pain Rating: (0-10 scale) 6-7/10 with pain medication   Worst: 9/10 with pain medication   Best: 6/10 with pain medication   Descriptors: constant, sharp, aching, burning, throbbing, stiffness    Pain  Pain:  [x] Yes   [] No       Location: (L) knee joint   Pain Rating: (0-10 scale) 6-7/10 with pain medication   Worst: 7/10 with pain medication  Best: 3/10 with pain medication  Descriptors: constant, sharp, aching, burning, throbbing, stiffness    Objective:  Modalities: Vasopneumatic compression x 15 minutes to both knees - low compression, 34 degrees,knee sleeves (semi-reclined position)  Precautions: anxiety, depression   Exercises:  Exercise Reps/ Time Weight/ Level Comments   Supine      Heel Slides 10 reps     Hip Abd/Add 10 reps     Short Arc Quad  10 reps  Large Bolster     SLR 10 reps     Ankle

## 2025-02-21 NOTE — CARE COORDINATION
Care Transitions Note    Follow Up Call     Attempted to reach patient for transitions of care follow up.  Unable to reach patient.      Outreach Attempts:   HIPAA compliant voicemail left for patient.     Follow Up Appointment:   Future Appointments         Provider Specialty Dept Phone    2/25/2025 9:45 AM Giancarlo Cote PTA Physical Therapy [phone]    2/26/2025 1:00 PM Mo Siddiqi, DO Orthopedic Surgery [phone]    2/27/2025 9:15 AM Giancarlo Cote PTA Physical Therapy [phone]    3/4/2025 9:00 AM Giancarlo Cote, PTA Physical Therapy [phone]    3/6/2025 9:00 AM Giancarlo Cote PTA Physical Therapy [phone]    3/11/2025 9:00 AM Giancarlo Cote, PTA Physical Therapy [phone]    3/13/2025 9:00 AM Giancarlo Cote PTA Physical Therapy [phone]    3/18/2025 9:15 AM Darwin Bundy, PT Physical Therapy [phone]    3/20/2025 9:00 AM Giancarlo Cote PTA Physical Therapy [phone]    3/25/2025 9:00 AM Giancarlo Cote, PTA Physical Therapy [phone]    3/27/2025 9:15 AM Darwin Bundy, PT Physical Therapy [phone]    1/23/2026 10:00 AM Irais Fallon, APRN - [facility] Obstetrics and Gynecology [phone]            Plan for follow-up call in 6-10 days based on severity of symptoms and risk factors. Plan for next call:   symptom management-pain, s/s infection, mental health  self management-blood pressure  follow-up appointment-engaged with PT  medication management-Pain control    Ondina Welch RN

## 2025-02-25 ENCOUNTER — HOSPITAL ENCOUNTER (OUTPATIENT)
Dept: PHYSICAL THERAPY | Age: 45
Setting detail: THERAPIES SERIES
Discharge: HOME | End: 2025-02-25
Attending: ORTHOPAEDIC SURGERY
Payer: COMMERCIAL

## 2025-02-25 DIAGNOSIS — Z96.651 S/P TOTAL KNEE ARTHROPLASTY, RIGHT: Primary | ICD-10-CM

## 2025-02-25 DIAGNOSIS — Z96.652 S/P TOTAL KNEE ARTHROPLASTY, LEFT: ICD-10-CM

## 2025-02-25 PROCEDURE — 97110 THERAPEUTIC EXERCISES: CPT

## 2025-02-25 PROCEDURE — 97140 MANUAL THERAPY 1/> REGIONS: CPT

## 2025-02-25 NOTE — FLOWSHEET NOTE
[facility]   Outpatient Rehabilitation & Therapy  [address]  P: [phone]   F: [phone]    Physical Therapy Daily Treatment Note      Date:  2025  Patient Name:  Sheyla Terrazas    :  1980  MRN: [id number]  Physician: Mo Siddiqi DO                          Insurance: [facility] - based on medical necessity. No hard max (authorization after 30  visit through [facility] Cares)   Medical Diagnosis: Primary OA of knee(s) (M17.0), Status post surgery (Z98.890)            Clinical Diagnosis: (R) knee pain (M25.561) (L) knee pain (M25.652) decreased knee range of motion (M25.661, M25.662)  Onset date: 2025                     Next 's appt.: 25  Visit Count:                                 Cancel/No Show: 0/0    Subjective:    Patient presented to therapy walking with FWW but reported feels balance has improved.  Patient denies significant pain but generalized Lavell knee ache that can increase at night. Patient informed has Dr Siddiqi post op F/U  scheduled for tomorrow.    Over 24 hours  Pain  Pain:  [x] Yes   [] No       Location: (R) knee joint   Pain Rating: (0-10 scale) 2/10 with pain medication   Worst: 9/10 with pain medication   Best: 6/10 with pain medication   Descriptors: constant, sharp, aching, burning, throbbing, stiffness    Pain  Pain:  [x] Yes   [] No       Location: (L) knee joint   Pain Rating: (0-10 scale) 1/10 with pain medication   Worst: 7/10 with pain medication  Best: 3/10 with pain medication  Descriptors: constant, sharp, aching, burning, throbbing, stiffness    Objective:  Modalities: Vasopneumatic compression x 15 minutes to both knees - low compression, 34 degrees,knee sleeves (semi-reclined position)  Precautions: anxiety, depression   Exercises:  Exercise Reps/ Time Weight/ Level Comments   Supine      Heel Slides 10 reps     Hip Abd/Add 10 reps     Short Arc Quad  10 reps  Large Bolster     SLR 10 reps     Ankle Pump/Circles 20 reps  Other

## 2025-02-26 ENCOUNTER — OFFICE VISIT (OUTPATIENT)
Dept: ORTHOPEDIC SURGERY | Age: 45
End: 2025-02-26

## 2025-02-26 VITALS — BODY MASS INDEX: 35 KG/M2 | RESPIRATION RATE: 17 BRPM | HEIGHT: 64 IN | OXYGEN SATURATION: 99 % | WEIGHT: 205 LBS

## 2025-02-26 DIAGNOSIS — Z96.652 S/P TOTAL KNEE ARTHROPLASTY, LEFT: ICD-10-CM

## 2025-02-26 DIAGNOSIS — Z96.651 S/P TOTAL KNEE ARTHROPLASTY, RIGHT: Primary | ICD-10-CM

## 2025-02-26 DIAGNOSIS — M17.0 PRIMARY OSTEOARTHRITIS OF BOTH KNEES: Primary | ICD-10-CM

## 2025-02-26 PROCEDURE — 99024 POSTOP FOLLOW-UP VISIT: CPT | Performed by: ORTHOPAEDIC SURGERY

## 2025-02-26 RX ORDER — HYDROXYZINE HYDROCHLORIDE 50 MG/1
25 TABLET, FILM COATED ORAL 4 TIMES DAILY PRN
Qty: 14 TABLET | Refills: 0 | Status: SHIPPED | OUTPATIENT
Start: 2025-02-26 | End: 2025-03-05

## 2025-02-26 ASSESSMENT — ENCOUNTER SYMPTOMS
ROS SKIN COMMENTS: NEGATIVE FOR RASH
ABDOMINAL PAIN: 0
EYE DISCHARGE: 0
SHORTNESS OF BREATH: 0

## 2025-02-26 NOTE — PROGRESS NOTES
suggested. She was reassured that she can sleep in any position that is comfortable for her, but should make an effort to fully extend her legs during the day. A 6-month handicap parking permit was issued. She was informed that she can resume driving once she feels confident in her mobility.    2. Maculopapular areas due to adhesive.  Some maculopapular areas were noted in the area where the adhesive was from the dressings. The skin is slightly excoriated, presumably from the dressing and scratching, but does not look infected. Hydrocortisone cream 1% ointment was recommended, ensuring it does not come into contact with the incision. Atarax was prescribed to help with itching. Benadryl cream was also suggested as a potential treatment.    3. Left calf tenderness.  She reported tenderness in the left calf extending down to the ankle. A negative Homans sign was noted. The chances of a blood clot are considered very low. She was advised to continue with the stretches taught in therapy, which have been effective in alleviating the crampy feeling in the morning.    Follow-up  The patient will follow up in 1 month.    PROCEDURE  On 02/11/2025, a revision right total knee arthroplasty was performed, converting a medial unicompartmental knee arthroplasty to a total knee arthroplasty. Additionally, a primary total knee arthroplasty was performed on the left knee.           Follow up: No follow-ups on file.    Orders Placed This Encounter   Medications    hydrOXYzine HCl (ATARAX) 50 MG tablet     Sig: Take 0.5 tablets by mouth 4 times daily as needed for Itching     Dispense:  14 tablet     Refill:  0       No orders of the defined types were placed in this encounter.      The patient (or guardian, if applicable) and other individuals in attendance with the patient were advised that Artificial Intelligence will be utilized during this visit to record, process the conversation to generate a clinical note, and support

## 2025-02-26 NOTE — PATIENT INSTRUCTIONS
PATIENTIQ:  PatientIQ helps [facility] stay in touch with you to know how you're feeling, and provides education and care instructions to you at various time points.   Your answers help your care team track your progress to provide the best care possible. PatientIQ will contact you pre-op and post-op via email or text with:  Educational Videos and Care Instructions  Questionnaires About How You're Feeling    Your participation provides you valuable education and helps [facility] continue to provide quality care to all patients. Thank you

## 2025-02-27 ENCOUNTER — HOSPITAL ENCOUNTER (OUTPATIENT)
Dept: PHYSICAL THERAPY | Age: 45
Setting detail: THERAPIES SERIES
Discharge: HOME | End: 2025-02-27
Attending: ORTHOPAEDIC SURGERY
Payer: COMMERCIAL

## 2025-02-27 PROCEDURE — 97116 GAIT TRAINING THERAPY: CPT

## 2025-02-27 PROCEDURE — 97110 THERAPEUTIC EXERCISES: CPT

## 2025-02-27 NOTE — FLOWSHEET NOTE
ON-  GOING  Details   STG: To be met in 12 treatments            1. No complaints of pain will be reported within the knees when attempting to perform her desired ADLs.   []  []  []      2. ? ROM: Patient will demonstrate full PROM in both knees to assist with (I) function.  []  []  []      LTG: To be met in 24 treatments           1. Improve score on functional assessment tool KOOS -JR by 10 points to assist with (I) function.  []  []  []      2. Establish a normal gait pattern without an assistive device.  []  []  []      3. Patient will demonstrate independence with her home exercise program at discharge.  []  []  []           Patient goals: To get back to normal     Pt. Education:  [] Yes  [x] No  [] Reviewed Prior HEP/Ed  Method of Education: [x] Verbal  [x] Demo  [x] Written  Comprehension of Education:  [] Verbalizes understanding.  [] Demonstrates understanding.  [x] Needs review.  [] Demonstrates/verbalizes HEP/Ed previously given.     Access Code: [email or web address]  URL: [email or web address]  Date: 02/27/2025  Prepared by: Giancarlo Cote    Exercises  - Standing March with Counter Support  - 1 x daily - 7 x weekly - 2 sets - 10 reps  - Standing Hip Abduction with Unilateral Counter Support  - 1 x daily - 7 x weekly - 2 sets - 10 reps  - Standing Hip Extension with Counter Support  - 1 x daily - 7 x weekly - 2 sets - 10 reps  - Standing Knee Flexion with Unilateral Counter Support  - 1 x daily - 7 x weekly - 2 sets - 10 reps  - Mini Squat with Counter Support  - 1 x daily - 7 x weekly - 2 sets - 10 reps  - Heel Toe Raises with Unilateral Counter Support  - 1 x daily - 7 x weekly - 2 sets - 10 reps  - Prone Quadriceps Stretch with Strap  - 1 x daily - 7 x weekly - 2 sets - 5 reps - 20 hold    Plan: [x] Continue per plan of care.   [] Other:      Treatment Charges: Mins Units   []  Modalities     [x]  Ther Exercise 37 2   []  Manual Therapy     []  Ther Activities     []  Aquatics     []  Neuromuscular     []

## 2025-02-28 ENCOUNTER — CARE COORDINATION (OUTPATIENT)
Dept: OTHER | Facility: CLINIC | Age: 45
End: 2025-02-28

## 2025-02-28 NOTE — CARE COORDINATION
Percocet.  Patient reprots Dr. Siddiqi prescribed Hydroxizine  Do you have any questions related to your medications?: No  Do you currently have any active services?: Yes  Are you currently active with any services?: Outpatient/Community Services  Do you have any needs or concerns that I can assist you with?: No  Identified Barriers: Other  Care Transitions Interventions  Other Interventions:              Follow Up Appointment:   WILLARD appointment attended as scheduled   Future Appointments         Provider Specialty Dept Phone    3/4/2025 9:00 AM Giancarlo Cote PTA Physical Therapy [phone]    3/6/2025 9:00 AM Giancarlo Cote PTA Physical Therapy [phone]    3/11/2025 9:00 AM Giancarlo Cote, PTA Physical Therapy [phone]    3/13/2025 9:00 AM Giancarlo Cote, PTA Physical Therapy [phone]    3/18/2025 9:15 AM Darwin Bundy, PT Physical Therapy [phone]    3/20/2025 9:00 AM Giancarlo Cote PTA Physical Therapy [phone]    3/25/2025 9:00 AM Giancarlo Cote, PTA Physical Therapy [phone]    3/27/2025 9:15 AM Darwin Bundy, PT Physical Therapy [phone]    4/2/2025 2:15 PM Mo Siddiqi, DO Orthopedic Surgery [phone]    1/23/2026 10:00 AM Irais Fallon, APRN - [facility] Obstetrics and Gynecology [phone]            Care Transition Nurse provided contact information.  Plan for follow-up call in 6-10 days based on severity of symptoms and risk factors.  Plan for next call: symptom management-pain, s/s infection  self management-blood pressure  follow-up appointment-Any appts scheduled  medication management-Med changes      Ondina Welch RN

## 2025-03-04 ENCOUNTER — HOSPITAL ENCOUNTER (OUTPATIENT)
Dept: PHYSICAL THERAPY | Age: 45
Setting detail: THERAPIES SERIES
Discharge: HOME | End: 2025-03-04
Attending: ORTHOPAEDIC SURGERY
Payer: COMMERCIAL

## 2025-03-04 PROCEDURE — 97110 THERAPEUTIC EXERCISES: CPT

## 2025-03-04 NOTE — FLOWSHEET NOTE
Time In: 9:00 am          Time Out: 9:45 am    Electronically signed by:  Giancarlo Cote PTA

## 2025-03-06 ENCOUNTER — HOSPITAL ENCOUNTER (OUTPATIENT)
Dept: PHYSICAL THERAPY | Age: 45
Setting detail: THERAPIES SERIES
Discharge: HOME | End: 2025-03-06
Attending: ORTHOPAEDIC SURGERY
Payer: COMMERCIAL

## 2025-03-06 PROCEDURE — 97110 THERAPEUTIC EXERCISES: CPT

## 2025-03-06 PROCEDURE — 97116 GAIT TRAINING THERAPY: CPT

## 2025-03-06 NOTE — FLOWSHEET NOTE
47 3   []  Manual Therapy     []  Ther Activities     []  Aquatics     []  Neuromuscular     [] Vasocompression     [x] Gait Training 8 1   [] Dry needling        [] 1 or 2 muscles        [] 3 or more muscles     []  Other     Total Billable time 55 3     Time In: 9:00 am          Time Out: 9:55 am    Electronically signed by:  Giancarlo Cote PTA

## 2025-03-07 ENCOUNTER — CARE COORDINATION (OUTPATIENT)
Dept: OTHER | Facility: CLINIC | Age: 45
End: 2025-03-07

## 2025-03-07 NOTE — CARE COORDINATION
Care Transitions Note    Follow Up Call     Attempted to reach patient for transitions of care follow up.  Unable to reach patient.      Outreach Attempts:   HIPAA compliant voicemail left for patient.     Follow Up Appointment:   Future Appointments         Provider Specialty Dept Phone    3/11/2025 9:00 AM Giancarlo Cote PTA Physical Therapy [phone]    3/13/2025 9:00 AM Giancarlo Cote PTA Physical Therapy [phone]    3/18/2025 9:15 AM Darwin Bundy, PT Physical Therapy [phone]    3/20/2025 9:00 AM Giancarlo Cote PTA Physical Therapy [phone]    3/25/2025 9:00 AM Giancarlo Cote PTA Physical Therapy [phone]    3/27/2025 9:15 AM Darwin Bundy, PT Physical Therapy [phone]    4/2/2025 2:15 PM Mo Siddiqi,  Orthopedic Surgery [phone]    1/23/2026 10:00 AM Irais Fallon, APRN - CNP Obstetrics and Gynecology [phone]            Plan for follow-up call in 6-10 days based on severity of symptoms and risk factors. Plan for next call:   symptom management-pain, s/s infection, mental health  follow-up appointment-engaged with PT  medication management-Pain control      Ondina Welch RN

## 2025-03-10 DIAGNOSIS — F32.A DEPRESSION, UNSPECIFIED DEPRESSION TYPE: ICD-10-CM

## 2025-03-10 RX ORDER — VENLAFAXINE HYDROCHLORIDE 37.5 MG/1
37.5 CAPSULE, EXTENDED RELEASE ORAL DAILY
Qty: 30 CAPSULE | Refills: 3 | Status: SHIPPED | OUTPATIENT
Start: 2025-03-10

## 2025-03-10 NOTE — TELEPHONE ENCOUNTER
Please Approve or Refuse.  Send to Pharmacy per Pt's Request:      Next Visit Date:  Visit date not found   Last Visit Date: 2/6/2025    Hemoglobin A1C (%)   Date Value   01/22/2025 5.4             ( goal A1C is < 7)   BP Readings from Last 3 Encounters:   02/13/25 (!) 161/92   02/05/25 124/80   01/22/25 (!) 150/93          (goal 120/80)  BUN   Date Value Ref Range Status   02/12/2025 13 6 - 20 mg/dL Final     Creatinine   Date Value Ref Range Status   02/12/2025 0.9 0.50 - 0.90 mg/dL Final     Potassium   Date Value Ref Range Status   02/12/2025 4.3 3.7 - 5.3 mmol/L Final

## 2025-03-11 ENCOUNTER — HOSPITAL ENCOUNTER (OUTPATIENT)
Dept: PHYSICAL THERAPY | Age: 45
Setting detail: THERAPIES SERIES
Discharge: HOME | End: 2025-03-11
Attending: ORTHOPAEDIC SURGERY
Payer: COMMERCIAL

## 2025-03-11 PROCEDURE — 97112 NEUROMUSCULAR REEDUCATION: CPT

## 2025-03-11 PROCEDURE — 97110 THERAPEUTIC EXERCISES: CPT

## 2025-03-11 NOTE — FLOWSHEET NOTE
[facility]   Outpatient Rehabilitation & Therapy  [address]  P: [phone]   F: [phone]    Physical Therapy Daily Treatment Note      Date:  3/11/2025  Patient Name:  Sheyla Terrazas    :  1980  MRN: [id number]  Physician: Mo Siddiqi DO                          Insurance: [facility] - based on medical necessity. No hard max (authorization after 30  visit through [facility] Cares)   Medical Diagnosis: Primary OA of knee(s) (M17.0), Status post surgery (Z98.890)            Clinical Diagnosis: (R) knee pain (M25.561) (L) knee pain (M25.652) decreased knee range of motion (M25.661, M25.662)  Onset date: 2025                     Next 's appt.: 25  Visit Count:                                 Cancel/No Show: 0/0    Subjective:    Patient noted less pain since last treatment and more \"stiffness\" in Lavell knee this AM.     Over 24 hours  Pain  Pain:  [x] Yes   [] No       Location: (R) knee joint   Pain Rating: (0-10 scale) 5/10 with pain medication   Worst: 9/10 with pain medication   Best: 6/10 with pain medication   Descriptors: constant, sharp, aching, burning, throbbing, stiffness    Pain  Pain:  [x] Yes   [] No       Location: (L) knee joint   Pain Rating: (0-10 scale) 3/10 with pain medication   Worst: 7/10 with pain medication  Best: 3/10 with pain medication  Descriptors: constant, sharp, aching, burning, throbbing, stiffness    Objective:  Modalities: Vasopneumatic compression x 15 minutes to both knees - low compression, 34 degrees,knee sleeves (semi-reclined position)- Held  Precautions: anxiety, depression   Exercises:  Exercise Reps/ Time Weight/ Level Completed Comments   Nu Step 5' L4 x Seat 8 Arms 10   Bike       Supine       Heel Slides 15 reps      Hip Abd/Add 10 reps      Short Arc Quad  15 reps  2#  Added wt 3/6   SLR 15 reps 2#  Added wt 3/6   Ankle Pump/Circles 20 reps      AAROM Heel Slide belt stretch 10x 10\" hold             Seated       LAQ

## 2025-03-13 ENCOUNTER — HOSPITAL ENCOUNTER (OUTPATIENT)
Dept: PHYSICAL THERAPY | Age: 45
Setting detail: THERAPIES SERIES
Discharge: HOME | End: 2025-03-13
Attending: ORTHOPAEDIC SURGERY
Payer: COMMERCIAL

## 2025-03-13 PROCEDURE — 97112 NEUROMUSCULAR REEDUCATION: CPT

## 2025-03-13 PROCEDURE — 97110 THERAPEUTIC EXERCISES: CPT

## 2025-03-13 NOTE — FLOWSHEET NOTE
[facility]   Outpatient Rehabilitation & Therapy  [address]  P: [phone]   F: [phone]    Physical Therapy Daily Treatment Note      Date:  3/13/2025  Patient Name:  Sheyla Terrazas    :  1980  MRN: [id number]  Physician: Mo Siddiqi DO                          Insurance: [facility] - based on medical necessity. No hard max (authorization after 30  visit through [facility] Cares)   Medical Diagnosis: Primary OA of knee(s) (M17.0), Status post surgery (Z98.890)            Clinical Diagnosis: (R) knee pain (M25.561) (L) knee pain (M25.652) decreased knee range of motion (M25.661, M25.662)  Onset date: 2025                     Next 's appt.: 25  Visit Count:                                 Cancel/No Show: 0/0    Subjective:    Patient noted minimal pain 1-2/10 in Lavell knee this AM.  Patient noted confident walking length of driveway due to nicer weather outside.     Over 24 hours  Pain  Pain:  [x] Yes   [] No       Location: (R) knee joint   Pain Rating: (0-10 scale) 2/10 with pain medication   Worst: 9/10 with pain medication   Best: 6/10 with pain medication   Descriptors: constant, sharp, aching, burning, throbbing, stiffness    Pain  Pain:  [x] Yes   [] No       Location: (L) knee joint   Pain Rating: (0-10 scale) 2/10 with pain medication   Worst: 7/10 with pain medication  Best: 3/10 with pain medication  Descriptors: constant, sharp, aching, burning, throbbing, stiffness    Objective:  Modalities: Vasopneumatic compression x 15 minutes to both knees - low compression, 34 degrees,knee sleeves (semi-reclined position)- Held  Precautions: anxiety, depression   Exercises:  Exercise Reps/ Time Weight/ Level Completed Comments   Nu Step  L4  Seat 8 Arms 10   Bike 5'  x Seat 3   Supine       Heel Slides 15 reps      Hip Abd/Add 10 reps      Short Arc Quad  15 reps  2#  Added wt 3/6   SLR 15 reps 2#  Added wt 3/6   Ankle Pump/Circles 20 reps      AAROM Heel Slide

## 2025-03-14 ENCOUNTER — CARE COORDINATION (OUTPATIENT)
Dept: OTHER | Facility: CLINIC | Age: 45
End: 2025-03-14

## 2025-03-14 ENCOUNTER — OFFICE VISIT (OUTPATIENT)
Dept: ORTHOPEDIC SURGERY | Age: 45
End: 2025-03-14

## 2025-03-14 VITALS — HEIGHT: 64 IN | WEIGHT: 204 LBS | RESPIRATION RATE: 16 BRPM | OXYGEN SATURATION: 100 % | BODY MASS INDEX: 34.83 KG/M2

## 2025-03-14 DIAGNOSIS — Z96.651 S/P TOTAL KNEE ARTHROPLASTY, RIGHT: Primary | ICD-10-CM

## 2025-03-14 PROCEDURE — 99024 POSTOP FOLLOW-UP VISIT: CPT

## 2025-03-14 RX ORDER — CEPHALEXIN 500 MG/1
500 CAPSULE ORAL 2 TIMES DAILY
Qty: 14 CAPSULE | Refills: 0 | Status: SHIPPED | OUTPATIENT
Start: 2025-03-14 | End: 2025-03-21

## 2025-03-14 RX ORDER — TRAZODONE HYDROCHLORIDE 50 MG/1
50 TABLET ORAL NIGHTLY
Qty: 30 TABLET | Refills: 0 | Status: SHIPPED | OUTPATIENT
Start: 2025-03-14

## 2025-03-14 ASSESSMENT — ENCOUNTER SYMPTOMS
COLOR CHANGE: 1
NAUSEA: 0
VOMITING: 0

## 2025-03-14 NOTE — PROGRESS NOTES
[facility] ORTHOPEDICS AND SPORTS MEDICINE  [address]  [place] [id number]  Dept: [phone]  Dept Fax: [phone]        Postoperative follow-up note    Subjective:   Sheyla Terrazas is a 44 y.o. year old female who presents to our office today for postoperative followup regarding her   1. S/P total knee arthroplasty, right    .    Chief Complaint   Patient presents with    Knee Pain     Bilateral, S/P revision right total knee arthroplasty with conversion from medial unicompartmental knee arthroplasty to total knee arthroplasty and a primary total knee arthroplasty of left knee both performed on 2/11/25.     Concerns about incision site on right knee         History of Present Illness  The patient is a 44-year-old female who presents for evaluation of her right knee incision.    She underwent bilateral total knee replacements on 02/11/2025, performed by Dr. Siddiqi, which included the removal of a previous right partial knee replacement. She expresses concern regarding the incision on her right knee, noting increased redness and sensitivity compared to the left. She has not observed any stitches or significant drainage from the site, but reports occasional hard material emerging from a small hole in the incision. She has not observed any blood. The area remains tender. She continues to attend physical therapy sessions twice weekly. She maintains an active lifestyle, engaging in walking exercises every 50 minutes and performing stretches as advised by her therapist.     She reports difficulty sleeping, despite avoiding daytime naps. She describes a sensation of restlessness in her legs at night, preventing her from achieving a comfortable position for sleep. She has not been diagnosed with restless leg syndrome and does not take any sleep aids. She has not tried melatonin due to concerns about vivid dreams, a side effect she

## 2025-03-14 NOTE — CARE COORDINATION
Care Transitions Note    Final Call     Patient Current Location:  [place]    Care Transition Nurse contacted the patient by telephone. Verified name and  as identifiers.    Patient graduated from the Care Transitions program on 3/14/25.  Patient/family has the ability to self manage at this time..      Advance Care Planning:   Does patient have an Advance Directive:  did not discuss due to time constraints .    Handoff:   Patient was not referred to the ACM team due to patient declined services.      Care Summary Note: Patient reports seen at Ortho today due to redness and puffiness proximal incision right knee.  Patient reports Ortho numbed and opened area.  Patient prescribed Keflex.  Patient denies noticeable drainage.  Patient reports area is sensitive to touch.  Patient instructed on s/s of infection to report including but not limited to fever, chills, foul smelling drainage, increased warmth, redness or hardness around incision.  Patient is applying Betadine to proximal incision right knee and keeping coverred.  Patient instructed to refrain from applying lotions, creams, ointments, or hydrogen peroxide unless directed by physician.  Patient reminded to wash incisions gently and pat incisions thoroughly dry.  Patient instructed on high protein diet, Vitamin C and hydration for incision healing.  Patient reports pain is controlled with Tylenol and Aleve.  Patient continues to c/o stiffness in the morning and swelling increases during the day.  Patient continues to utilize ice 2-3 times daily and after therapy.    Patient is ambulating with cane mostly, utilizes walker in morning and evening when tired.  Patient denies falls.  Patient is engaged with outpatient PT twice per week.  Reeval is scheduled for 3/18/25 per patient.     [facility] discussed RED FLAGS and encouraged patient to contact 911 for life threatening emergencies and PCP office  for non life-threatening symptoms.   Patient denies any further

## 2025-03-18 ENCOUNTER — HOSPITAL ENCOUNTER (OUTPATIENT)
Dept: PHYSICAL THERAPY | Age: 45
Setting detail: THERAPIES SERIES
Discharge: HOME | End: 2025-03-18
Attending: ORTHOPAEDIC SURGERY
Payer: COMMERCIAL

## 2025-03-18 PROCEDURE — 97110 THERAPEUTIC EXERCISES: CPT

## 2025-03-18 NOTE — PROGRESS NOTES
[facility]   Outpatient Rehabilitation & Therapy  [address]         Phone: [phone]       Fax: [phone]    Physical Therapy Progress Note Update    Date:  3/18/2025  Patient: Sheyla Terrazas  : 1980  MRN: [id number]  Physician: Mo Siddiqi DO    Insurance: [facility] - based on medical necessity. No hard max (authorization after 30  visit through [facility])   Medical Diagnosis: Primary OA of knee(s) (M17.0), Status post surgery (Z98.890)   Clinical Diagnosis: (R) knee pain (M25.561) (L) knee pain (M25.652) decreased knee range of motion (M25.661, M25.662)  Onset date: 2025  Next 's appt.: TBD  Visit Count: 10/24   Cancel/No Show: 0/0    Subjective:   Patient stated that she felt the treatment sessions have been successful. Relatively pain free within the knee(s). Active knee motions have improved while performing her desired ADLs. No longer requires a rolling walker for ambulation 100% of the time. Currently using a single point cane, the majority of the time for standing tasks and ambulation. However, stiffness is still significant throughout the knee joint(s), especially upon waking/lasting 30 minutes to 1 hour daily. Weakness is still felt throughout the knee(s) while performing her desired ADLs     Function:  Hand Dominance  [x] Right  [] Left  Employer [employer]    Job Status []  Normal duty   [] Light duty   [x] Off due to condition    []  Retired   [] Not employed   [] Disability  [] Other:  []  Return to work:   Work activities/duties Pharmacist       ADL/IADL Previous level of function Current level of function Who currently assists the patient with task   Bathing  [x] Independent  [] Assist [] Independent  [x] Assist Spouse    Dress/grooming [x] Independent  [] Assist [] Independent  [x] Assist Spouse    Transfer/mobility [x] Independent  [] Assist [] Independent  [x] Assist Spouse   Feeding [x]

## 2025-03-20 ENCOUNTER — HOSPITAL ENCOUNTER (OUTPATIENT)
Dept: PHYSICAL THERAPY | Age: 45
Setting detail: THERAPIES SERIES
Discharge: HOME | End: 2025-03-20
Attending: ORTHOPAEDIC SURGERY
Payer: COMMERCIAL

## 2025-03-20 PROCEDURE — 97110 THERAPEUTIC EXERCISES: CPT

## 2025-03-20 NOTE — FLOWSHEET NOTE
[] Gait Training     [] Dry needling        [] 1 or 2 muscles        [] 3 or more muscles     []  Other     Total Billable time 45 3   5 min spent communicating with patient prior to treatment.     Time In: 9:00 am          Time Out: 9:50 am    Electronically signed by:  Giancarlo Cote PTA

## 2025-03-25 ENCOUNTER — HOSPITAL ENCOUNTER (OUTPATIENT)
Dept: PHYSICAL THERAPY | Age: 45
Setting detail: THERAPIES SERIES
Discharge: HOME | End: 2025-03-25
Attending: ORTHOPAEDIC SURGERY
Payer: COMMERCIAL

## 2025-03-25 PROCEDURE — 97110 THERAPEUTIC EXERCISES: CPT

## 2025-03-25 NOTE — FLOWSHEET NOTE
[facility]   Outpatient Rehabilitation & Therapy  [address]  P: [phone]   F: [phone]    Physical Therapy Daily Treatment Note      Date:  3/25/2025  Patient Name:  Sheyla Terrazas    :  1980  MRN: [id number]  Physician: Mo Siddiqi DO                          Insurance: [facility] - based on medical necessity. No hard max (authorization after  visit through [facility] Cares)   Medical Diagnosis: Primary OA of knee(s) (M17.0), Status post surgery (Z98.890)            Clinical Diagnosis: (R) knee pain (M25.561) (L) knee pain (M25.652) decreased knee range of motion (M25.661, M25.662)  Onset date: 2025                     Next 's appt.: 25  Visit Count:                                 Cancel/No Show: 0/0    Subjective:    Patient R knee superior incision had not fully healed and is being covered to 1x1\" bandage.  Patient is scheduled to see Dr Siddiqi Thursday and will see Primary PT next visit.   Patient reported generalized soreness this AM.  Patient using Tylenol to control pain and is currently 3/10 in [place]ell knee pain.    Over 24 hours  Pain  Pain:  [x] Yes   [] No       Location: (R) knee joint lateral  Pain Rating: (0-10 scale) 3/10 with pain medication   Worst: 4/10 with pain medication   Best: 0/10 with pain medication   Descriptors: constant, sharp, aching, burning, throbbing, stiffness    Pain  Pain:  [x] Yes   [] No       Location: (L) knee joint   Pain Rating: (0-10 scale) 3/10 with pain medication   Worst: 4/10 with pain medication  Best: 0/10 with pain medication  Descriptors: constant, sharp, aching, burning, throbbing, stiffness    Objective:  Modalities: Vasopneumatic compression x 15 minutes to both knees - low compression, 34 degrees,knee sleeves (semi-reclined position)- Held  Precautions: anxiety, depression   Exercises:  Exercise Reps/ Time Weight/ Level Completed Comments   Nu Step  L4 x Seat 8 Arms 10   Bike 5'   Seat 3   Supine

## 2025-03-27 ENCOUNTER — HOSPITAL ENCOUNTER (OUTPATIENT)
Dept: PHYSICAL THERAPY | Age: 45
Setting detail: THERAPIES SERIES
Discharge: HOME | End: 2025-03-27
Attending: ORTHOPAEDIC SURGERY
Payer: COMMERCIAL

## 2025-03-27 ENCOUNTER — OFFICE VISIT (OUTPATIENT)
Dept: ORTHOPEDIC SURGERY | Age: 45
End: 2025-03-27

## 2025-03-27 VITALS — WEIGHT: 200 LBS | RESPIRATION RATE: 16 BRPM | OXYGEN SATURATION: 99 % | BODY MASS INDEX: 34.15 KG/M2 | HEIGHT: 64 IN

## 2025-03-27 DIAGNOSIS — Z96.651 S/P TOTAL KNEE ARTHROPLASTY, RIGHT: Primary | ICD-10-CM

## 2025-03-27 DIAGNOSIS — Z96.652 S/P TOTAL KNEE ARTHROPLASTY, LEFT: ICD-10-CM

## 2025-03-27 PROCEDURE — 97110 THERAPEUTIC EXERCISES: CPT

## 2025-03-27 PROCEDURE — 99024 POSTOP FOLLOW-UP VISIT: CPT | Performed by: ORTHOPAEDIC SURGERY

## 2025-03-27 ASSESSMENT — ENCOUNTER SYMPTOMS
EYE DISCHARGE: 0
SHORTNESS OF BREATH: 0
ROS SKIN COMMENTS: NEGATIVE FOR RASH
ABDOMINAL PAIN: 0

## 2025-03-27 NOTE — FLOWSHEET NOTE
[facility]   Outpatient Rehabilitation & Therapy  [address]  P: [phone]   F: [phone]    Physical Therapy Daily Treatment Note    Date:  3/27/2025  Patient Name:  Sheyla Terrazas    :  1980  MRN: [id number]  Physician: Mo Siddiqi DO                          Insurance: [facility] - based on medical necessity. No hard max (authorization after 30 th visit through [facility] Cares)   Medical Diagnosis: Primary OA of knee(s) (M17.0), Status post surgery (Z98.890)            Clinical Diagnosis: (R) knee pain (M25.561) (L) knee pain (M25.652) decreased knee range of motion (M25.661, M25.662)  Onset date: 2025                     Next 's appt.: 25  Visit Count:                                 Cancel/No Show: 0/0    Subjective:    Patient stated that she has had less stiffness within both knees since her treatment session. However, pain levels are relatively unchanged since her last treatment session.      Over 24 hours  Pain  Pain:  [x] Yes   [] No       Location: (R) knee joint lateral  Pain Rating: (0-10 scale) 3/10 with pain medication   Worst: 4/10 with pain medication   Best: 0/10 with pain medication   Descriptors: constant, sharp, aching, burning, throbbing, stiffness    Pain  Pain:  [x] Yes   [] No       Location: (L) knee joint   Pain Rating: (0-10 scale) 3/10 with pain medication   Worst: 4/10 with pain medication  Best: 0/10 with pain medication  Descriptors: constant, sharp, aching, burning, throbbing, stiffness    Objective:  Modalities:   Precautions: anxiety, depression   Exercises:  Exercise Reps/ Time Weight/ Level Comments   Bike 5'  Seat 3   Squat from Chair 10 reps      Step Up F/L  10 reps x 2 sets  8 inch     Step Downs  10 reps x 2 sets  4 inch     3-way hip 10 reps Yellow    TKE 15 reps  Green    Passive Stretching   Seated Hamstring Stretch 30 sec hold x 3 reps with each leg   Seated Gastroc Stretch 30 sec hold x 3 reps with each leg

## 2025-03-27 NOTE — PROGRESS NOTES
[facility] PHYSICIANS [facility] ORTHOPEDICS AND SPORTS MEDICINE  [address]  [address] [place], [place] [address]  Dept: [phone]  Dept Fax: [phone]        Postoperative follow-up note    Subjective:     History of Present Illness  The patient is a 44-year-old female who presents for a recheck of bilateral knee replacement. She saw GABI Wong on 03/14/2025.    Concern is expressed over an open area in the right knee incision, which is perceived as having worsened since the last visit. Daily bleeding from the site is reported, but no systemic symptoms such as fever or malaise are experienced. Wound management has been conducted with wet-to-dry dressings as per medical advice. A week-long course of cephalexin was administered.  .    Chief Complaint   Patient presents with    Post-Op Check     Bilateral TKA 2/11/25  R Knee Wound Check       Review of Systems   Constitutional:  Positive for activity change. Negative for fever.   HENT:  Negative for dental problem.    Eyes:  Negative for discharge.   Respiratory:  Negative for shortness of breath.    Cardiovascular:  Negative for chest pain.   Gastrointestinal:  Negative for abdominal pain.   Genitourinary: Negative.    Musculoskeletal:  Positive for arthralgias.   Skin:         Negative for rash   Neurological:  Positive for weakness.   Psychiatric/Behavioral:  Negative for confusion.        I have reviewed the CC, HPI, ROS, PMH, FHX, Social History, and if not present in this note, I have reviewed in the patient's chart.   I agree with the documentation provided by other staff and have reviewed their documentation prior to providing my signature indicating agreement.    Objective :   General: Sheyla Terrazas is a 44 y.o. female who is alert and oriented and sitting comfortably in our office.  Ortho Exam  Physical Exam  Musculoskeletal:  Right Knee:  - 5 cm x 1 cm area of induration with a central open area

## 2025-03-28 ENCOUNTER — TELEPHONE (OUTPATIENT)
Dept: ORTHOPEDIC SURGERY | Age: 45
End: 2025-03-28

## 2025-03-28 NOTE — TELEPHONE ENCOUNTER
Shantell from [facility] called in to request a refill on cephALEXin (KEFLEX) 500 MG capsule.    States when pt was in to see Dr. Siddiqi yesterday 03/27/25 was advised he was going to give her 1 more refill on the antibiotic due to removing her stitches.      Please send to    [id number] - [address] -  [phone] -  [phone]

## 2025-04-01 ENCOUNTER — HOSPITAL ENCOUNTER (OUTPATIENT)
Dept: PHYSICAL THERAPY | Age: 45
Setting detail: THERAPIES SERIES
Discharge: HOME | End: 2025-04-01
Attending: ORTHOPAEDIC SURGERY
Payer: COMMERCIAL

## 2025-04-01 ENCOUNTER — TELEPHONE (OUTPATIENT)
Dept: ORTHOPEDIC SURGERY | Age: 45
End: 2025-04-01

## 2025-04-01 PROCEDURE — 97110 THERAPEUTIC EXERCISES: CPT

## 2025-04-01 NOTE — TELEPHONE ENCOUNTER
PATIENT NEEDS A PO APPT AROUND THE DATE OF 5/7/25 (she had one scheduled for 4/2/25 but Dr Siddiqi told her to schedule one for 5 weeks out if she is healing and cancel the 4/2/25 appt) Patient requesting appt for 5/2/25 due to her work schedule.     He wants to see her specifically because she had an infection.     Please patient back @[phone]

## 2025-04-01 NOTE — FLOWSHEET NOTE
[facility]   Outpatient Rehabilitation & Therapy  [address] [facility] [address]  P: [phone]   F: [phone]    Physical Therapy Daily Treatment Note    Date:  2025  Patient Name:  Sheyla Terrazas    :  1980  MRN: [id number]  Physician: Mo Siddiqi DO                          Insurance: [facility] - based on medical necessity. No hard max (authorization after 30  visit through [facility])   Medical Diagnosis: Primary OA of knee(s) (M17.0), Status post surgery (Z98.890)            Clinical Diagnosis: (R) knee pain (M25.561) (L) knee pain (M25.652) decreased knee range of motion (M25.661, M25.662)  Onset date: 2025                     Next 's appt.: 25  Visit Count:                                 Cancel/No Show: 0/0    Subjective:    Patient noted knees \"just stiff\" this morning.  Noted has occasional L medial knee pain after resting with initial flexion but reduces with activity and ROM. Denies pain in R knee 2/10 in L knee.    Over 24 hours  Pain  Pain:  [x] Yes   [] No       Location: (R) knee joint lateral  Pain Rating: (0-10 scale) Denies/10 with pain medication   Worst: 4/10 with pain medication   Best: 0/10 with pain medication   Descriptors: constant, sharp, aching, burning, throbbing, stiffness    Pain  Pain:  [x] Yes   [] No       Location: (L) knee joint   Pain Rating: (0-10 scale) 2/10 with pain medication   Worst: 4/10 with pain medication  Best: 0/10 with pain medication  Descriptors: constant, sharp, aching, burning, throbbing, stiffness    Objective:  Modalities:   Precautions: anxiety, depression   Exercises:  Exercise Reps/ Time Weight/ Level Completed Comments   Bike 5'   Seat 3   Squat from Chair 10 reps   x    Step Up F/L  10 reps x 2 sets  8 inch  x    Step Downs  10 reps x 2 sets  6 inch  x Inc height 4/   3-way hip 15 reps Yellow x Inc reps 4/   TKE 15 reps  Green x    Leg Press 10 reps x 2 sets #35 x Added    Balance       Fwd Lunge 10x  x

## 2025-04-02 NOTE — TELEPHONE ENCOUNTER
Patient was called and scheduled an appt for 4/23/25. Her FMLA is up 4/28/25, so that's get her into see provider before her FMLA is up. She insisted on seeing Dr Siddiqi because of her Hx of infection.

## 2025-04-02 NOTE — TELEPHONE ENCOUNTER
Pt scheduled 5/5/25 at 8:45am.      Please let pt know Dr Siddiqi does not have office hours on Friday -- she had requested an appt 5/2/25.  This is the best I could do.  Let me know if there are any issues.

## 2025-04-03 ENCOUNTER — APPOINTMENT (OUTPATIENT)
Dept: PHYSICAL THERAPY | Age: 45
End: 2025-04-03
Attending: ORTHOPAEDIC SURGERY
Payer: COMMERCIAL

## 2025-04-03 ENCOUNTER — HOSPITAL ENCOUNTER (OUTPATIENT)
Dept: PHYSICAL THERAPY | Age: 45
Setting detail: THERAPIES SERIES
Discharge: HOME | End: 2025-04-03
Attending: ORTHOPAEDIC SURGERY
Payer: COMMERCIAL

## 2025-04-03 PROCEDURE — 97110 THERAPEUTIC EXERCISES: CPT

## 2025-04-03 PROCEDURE — 97140 MANUAL THERAPY 1/> REGIONS: CPT

## 2025-04-03 NOTE — FLOWSHEET NOTE
[facility]   Outpatient Rehabilitation & Therapy  [address]  P: [phone]   F: [phone]    Physical Therapy Daily Treatment Note    Date:  4/3/2025  Patient Name:  Sheyla Terrazas    :  1980  MRN: [id number]  Physician: Mo Siddiqi DO                          Insurance: [facility] - based on medical necessity. No hard max (authorization after 30  visit through [facility]ares)   Medical Diagnosis: Primary OA of knee(s) (M17.0), Status post surgery (Z98.890)            Clinical Diagnosis: (R) knee pain (M25.561) (L) knee pain (M25.652) decreased knee range of motion (M25.661, M25.662)  Onset date: 2025                     Next Dr's appt.: 25  Visit Count: 15/24                                Cancel/No Show: 0/0    Subjective:    Patient noted feels with any prolonged stand or sit for more than 30 min, feels like Lavell knees \"freeze up\" Denies pain in R knee 1/10 in L knee.  Plans to RTW on .    Over 24 hours  Pain  Pain:  [x] Yes   [] No       Location: (R) knee joint lateral  Pain Rating: (0-10 scale) Denies/10 with pain medication   Worst: 4/10 with pain medication   Best: 0/10 with pain medication   Descriptors: \"intermittent\"  sharp, aching, burning, throbbing, stiffness    Pain  Pain:  [x] Yes   [] No       Location: (L) knee joint   Pain Rating: (0-10 scale) 1/10 with pain medication   Worst: 4/10 with pain medication  Best: 0/10 with pain medication  Descriptors: \"intermittent\" sharp, aching, burning, throbbing, stiffness    Objective:  Modalities:   Precautions: anxiety, depression   Exercises:  Exercise Reps/ Time Weight/ Level Completed Comments   Bike 5'   Seat 3   Squat from Chair 10 reps   x    Step Up F/L  10 reps x 2 sets  8 inch  x    Step Downs  10 reps x 2 sets  6 inch  x Inc height /   3-way hip 15 reps   Green x Inc reps    TKE 15 reps  Green x    Leg Press 10 reps x 2 sets #35 x Added , move seat up next visit   Standing Knee Flex

## 2025-04-08 ENCOUNTER — HOSPITAL ENCOUNTER (OUTPATIENT)
Dept: PHYSICAL THERAPY | Age: 45
Setting detail: THERAPIES SERIES
Discharge: HOME | End: 2025-04-08
Attending: ORTHOPAEDIC SURGERY
Payer: COMMERCIAL

## 2025-04-08 DIAGNOSIS — I10 HYPERTENSION, UNSPECIFIED TYPE: ICD-10-CM

## 2025-04-08 PROCEDURE — 97110 THERAPEUTIC EXERCISES: CPT

## 2025-04-08 RX ORDER — LISINOPRIL 30 MG/1
30 TABLET ORAL DAILY
Qty: 90 TABLET | Refills: 0 | Status: SHIPPED | OUTPATIENT
Start: 2025-04-08

## 2025-04-08 NOTE — FLOWSHEET NOTE
[facility]   Outpatient Rehabilitation & Therapy  [address]  P: [phone]   F: [phone]    Physical Therapy Daily Treatment Note    Date:  2025  Patient Name:  Sheyla Terrazas    :  1980  MRN: [id number]  Physician: Mo Siddiqi DO                          Insurance: [facility] - based on medical necessity. No hard max (authorization after 30  visit through [facility])   Medical Diagnosis: Primary OA of knee(s) (M17.0), Status post surgery (Z98.890)            Clinical Diagnosis: (R) knee pain (M25.561) (L) knee pain (M25.652) decreased knee range of motion (M25.661, M25.662)  Onset date: 2025                     Next 's appt.: 25  Visit Count:                                 Cancel/No Show: 0/0    Subjective:    Patient noted stiffness from activity and colder weather this AM.   Lavell knee pain 3/10 and required use of Tylenol and Aleve needed past 2 days.  Plans to RTW on .    Over 24 hours  Pain  Pain:  [x] Yes   [] No       Location: (R) knee joint lateral  Pain Rating: (0-10 scale) 3/10 with pain medication   Worst: 4/10 with pain medication   Best: 0/10 with pain medication   Descriptors: \"intermittent\"  sharp, aching, burning, throbbing, stiffness    Pain  Pain:  [x] Yes   [] No       Location: (L) knee joint   Pain Rating: (0-10 scale) 3/10 with pain medication   Worst: 4/10 with pain medication  Best: 0/10 with pain medication  Descriptors: \"intermittent\" sharp, aching, burning, throbbing, stiffness    Objective:  Modalities:   Precautions: anxiety, depression   Exercises:  Exercise Reps/ Time Weight/ Level Completed Comments   Bike 5'   Seat 3   Squat from Chair 10 reps   x Blue foam   Step Up F/L  10 reps x 2 sets  8 inch  x    Step Downs  10 reps x 2 sets  6 inch  x Inc height 4/   3-way hip 15 reps   Green x Inc reps 4/   TKE 15 reps  Green x    Leg Press 10 reps x 2 sets #35 x seat back 3   Leg Ext 10 reps x 2 sets #10 x Added

## 2025-04-10 ENCOUNTER — HOSPITAL ENCOUNTER (OUTPATIENT)
Dept: PHYSICAL THERAPY | Age: 45
Setting detail: THERAPIES SERIES
Discharge: HOME | End: 2025-04-10
Attending: ORTHOPAEDIC SURGERY
Payer: COMMERCIAL

## 2025-04-10 PROCEDURE — 97110 THERAPEUTIC EXERCISES: CPT

## 2025-04-10 NOTE — FLOWSHEET NOTE
[facility]   Outpatient Rehabilitation & Therapy  [address]  P: [phone]   F: [phone]    Physical Therapy Daily Treatment Note    Date:  4/10/2025  Patient Name:  Sheyla Terrazas    :  1980  MRN: [id number]  Physician: Mo Siddiqi DO                          Insurance: [facility] - based on medical necessity. No hard max (authorization after 30  visit through [facility] Cares)   Medical Diagnosis: Primary OA of knee(s) (M17.0), Status post surgery (Z98.890)            Clinical Diagnosis: (R) knee pain (M25.561) (L) knee pain (M25.652) decreased knee range of motion (M25.661, M25.662)  Onset date: 2025                     Next 's appt.: 25  Visit Count:                                 Cancel/No Show: 0/0    Subjective:    Patient noted spent increased time in car traveling to [place] and was able to respond with minimal stiffness.  Today woke up and currently denies any pain in either knee.  Plans to RTW on .    Over 24 hours  Pain  Pain:  [x] Yes   [] No       Location: (R) knee joint lateral  Pain Rating: (0-10 scale) Denies/10 with pain medication   Worst: 4/10 with pain medication   Best: 0/10 with pain medication   Descriptors: \"intermittent\"  sharp, aching, burning, throbbing, stiffness    Pain  Pain:  [x] Yes   [] No       Location: (L) knee joint   Pain Rating: (0-10 scale) Denies/10 with pain medication   Worst: 4/10 with pain medication  Best: 0/10 with pain medication  Descriptors: \"intermittent\" sharp, aching, burning, throbbing, stiffness    Objective:  Modalities:   Precautions: anxiety, depression   Exercises:  Exercise Reps/ Time Weight/ Level Completed Comments   Bike 5'   Seat 3   Squat from Chair 10 reps   x Blue foam   Step Up F/L  10 reps x 2 sets  10 inch  x Inc height 4/10   Step Downs  10 reps x 2 sets  8 inch  x Inc height 4/10   3-way hip 15 reps   Green x Inc reps 4/1   TKE 15 reps  Blue x Inc resist 4/10   Leg Press

## 2025-04-15 ENCOUNTER — HOSPITAL ENCOUNTER (OUTPATIENT)
Dept: PHYSICAL THERAPY | Age: 45
Setting detail: THERAPIES SERIES
Discharge: HOME | End: 2025-04-15
Attending: ORTHOPAEDIC SURGERY
Payer: COMMERCIAL

## 2025-04-15 PROCEDURE — 97110 THERAPEUTIC EXERCISES: CPT

## 2025-04-15 NOTE — FLOWSHEET NOTE
[facility]   Outpatient Rehabilitation & Therapy  [address]  P: [phone]   F: [phone]    Physical Therapy Daily Treatment Note    Date:  4/15/2025  Patient Name:  Sheyla Terrazas    :  1980  MRN: [id number]  Physician: Mo Siddiqi DO                          Insurance: [facility] - based on medical necessity. No hard max (authorization after 30  visit through [facility] Cares)   Medical Diagnosis: Primary OA of knee(s) (M17.0), Status post surgery (Z98.890)            Clinical Diagnosis: (R) knee pain (M25.561) (L) knee pain (M25.652) decreased knee range of motion (M25.661, M25.662)  Onset date: 2025                     Next 's appt.: TBD  Visit Count:                                 Cancel/No Show: 0/0    Subjective:    Patient stated that her pain levels have slightly increased since her last treatment session. Pain is still intermittent but she has been noticing more soreness and discomfort within the distal inner thigh and knee joint(s). Still taking over the counter pain medication for pain relief.     Over 24 hours  Pain  Pain:  [x] Yes   [] No       Location: (R) knee joint medial joint line and distal inner thigh.   Pain Rating: (0-10 scale) 0 /10 with pain medication   Worst: 5/10 with pain medication   Best: 0/10 with pain medication   Descriptors: \"intermittent\"  sharp, aching, burning, throbbing, stiffness    Pain  Pain:  [x] Yes   [] No       Location: (L) knee joint medial joint line and distal inner thigh.   Pain Rating: (0-10 scale)  0 /10 with pain medication   Worst: 5/10 with pain medication  Best: 0/10 with pain medication  Descriptors: \"intermittent\" sharp, aching, burning, throbbing, stiffness    Objective:  Modalities:   Precautions: anxiety, depression   Exercises:  Exercise Reps/ Time Weight/ Level Comments   Bike 5'  Seat 3   Squat from Chair 10 reps   Blue foam   Step Up F/L  10 reps x 2 sets  10 inch     Step Downs  10 reps x 2 sets

## 2025-04-16 DIAGNOSIS — F41.9 ANXIETY: ICD-10-CM

## 2025-04-16 DIAGNOSIS — F32.A DEPRESSION, UNSPECIFIED DEPRESSION TYPE: ICD-10-CM

## 2025-04-16 DIAGNOSIS — N95.1 VASOMOTOR SYMPTOMS DUE TO MENOPAUSE: ICD-10-CM

## 2025-04-16 RX ORDER — VENLAFAXINE HYDROCHLORIDE 75 MG/1
75 CAPSULE, EXTENDED RELEASE ORAL DAILY
Qty: 30 CAPSULE | Refills: 2 | Status: SHIPPED | OUTPATIENT
Start: 2025-04-16

## 2025-04-17 ENCOUNTER — HOSPITAL ENCOUNTER (OUTPATIENT)
Dept: PHYSICAL THERAPY | Age: 45
Setting detail: THERAPIES SERIES
Discharge: HOME | End: 2025-04-17
Attending: ORTHOPAEDIC SURGERY
Payer: COMMERCIAL

## 2025-04-17 PROCEDURE — 97110 THERAPEUTIC EXERCISES: CPT

## 2025-04-17 NOTE — FLOWSHEET NOTE
understanding.  [] Demonstrates understanding.  [] Needs review.  [x] Demonstrates/verbalizes HEP/Ed previously given.    Plan: [x] Continue per plan of care.   [] Other:      Treatment Charges: Mins Units   []  Modalities     [x]  Ther Exercise 45 3   [x]  Manual Therapy     []  Ther Activities     []  Aquatics     []  Neuromuscular     [] Vasocompression     [] Gait Training     [] Dry needling        [] 1 or 2 muscles        [] 3 or more muscles     []  Other     Total Billable time 45 3      Time In: 6464       Time Out: 1045    Electronically signed by:  Darwin Bundy PT  DPT

## 2025-04-22 ENCOUNTER — HOSPITAL ENCOUNTER (OUTPATIENT)
Dept: PHYSICAL THERAPY | Age: 45
Setting detail: THERAPIES SERIES
Discharge: HOME | End: 2025-04-22
Attending: ORTHOPAEDIC SURGERY
Payer: COMMERCIAL

## 2025-04-22 PROCEDURE — 97110 THERAPEUTIC EXERCISES: CPT

## 2025-04-22 NOTE — FLOWSHEET NOTE
[facility]   Outpatient Rehabilitation & Therapy  [address]  P: [phone]   F: [phone]    Physical Therapy Daily Treatment Note    Date:  2025  Patient Name:  Sheyla Terrazas    :  1980  MRN: [id number]  Physician: Mo Siddiqi DO                          Insurance: [facility] - based on medical necessity. No hard max (authorization after 30  visit through [facility] Cares)   Medical Diagnosis: Primary OA of knee(s) (M17.0), Status post surgery (Z98.890)            Clinical Diagnosis: (R) knee pain (M25.561) (L) knee pain (M25.652) decreased knee range of motion (M25.661, M25.662)  Onset date: 2025                     Next 's appt.: 25  Visit Count:                                 Cancel/No Show: 0/0    Subjective:    Patient has follow up with Dr Siddiqi tomorrow.  Podiatrist appt made for May 6th.  Patient reported no pain just stiffness in Lavell knee in AM.  Soreness reduces as activity increases with walking and ADLs.     Over 24 hours  Pain  Pain:  [x] Yes   [] No       Location: (R) knee joint medial joint line and distal inner thigh.   Pain Rating: (0-10 scale) 0 /10 with pain medication   Worst: 5/10 with pain medication   Best: 0/10 with pain medication   Descriptors: \"intermittent\"  sharp, aching, burning, throbbing, stiffness    Pain  Pain:  [x] Yes   [] No       Location: (L) knee joint medial joint line and distal inner thigh.   Pain Rating: (0-10 scale)  0 /10 with pain medication   Worst: 5/10 with pain medication  Best: 0/10 with pain medication  Descriptors: \"intermittent\" sharp, aching, burning, throbbing, stiffness    Objective:  Modalities:   Precautions: anxiety, depression   Exercises:  Exercise Reps/ Time Weight/ Level Comments   Bike 5'  Seat 3   Squat from Chair 10 reps   Blue foam   Step Up F/L  10 reps x 2 sets  10 inch     Step Downs  10 reps x 2 sets  8 inch     3-way hip 15 reps   Green    TKE 15 reps  Blue    Leg Press 15 reps

## 2025-04-23 ENCOUNTER — PATIENT MESSAGE (OUTPATIENT)
Dept: ORTHOPEDIC SURGERY | Age: 45
End: 2025-04-23

## 2025-04-23 ENCOUNTER — OFFICE VISIT (OUTPATIENT)
Dept: ORTHOPEDIC SURGERY | Age: 45
End: 2025-04-23

## 2025-04-23 VITALS — WEIGHT: 200 LBS | OXYGEN SATURATION: 100 % | HEIGHT: 64 IN | BODY MASS INDEX: 34.15 KG/M2 | RESPIRATION RATE: 16 BRPM

## 2025-04-23 DIAGNOSIS — Z96.652 S/P TOTAL KNEE ARTHROPLASTY, LEFT: Primary | ICD-10-CM

## 2025-04-23 DIAGNOSIS — Z96.651 S/P TOTAL KNEE ARTHROPLASTY, RIGHT: ICD-10-CM

## 2025-04-23 PROCEDURE — 99024 POSTOP FOLLOW-UP VISIT: CPT | Performed by: ORTHOPAEDIC SURGERY

## 2025-04-23 NOTE — PROGRESS NOTES
[facility] ORTHOPEDICS AND SPORTS MEDICINE  [address]  [place] [address]  Dept: [phone]  Dept Fax: [phone]        Postoperative follow-up note    Subjective:   History of Present Illness            Sheyla Terrazas  is a 44 y.o. female who presents today in follow for bilateral TKAs performed on 2/11/25. Patient is 10 weeks postoperative today and doing well overall.  The patient was last seen on 3/14/2025 and underwent treatment in the form of bedside I&D of a suture abscess at the proximal aspect of the right incision. She completed oral antibiotics and is doing much better. She feels she is improving. Denies pain at this time but does endorse stiffness, especially when transitioning from a seated position to standing, and vice versa.   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 No new injuries or falls. Patient is ambulating independently without difficulty at this time.         Chief Complaint   Patient presents with    Knee Pain     Bilateral dos: 2/11/25 TKA        Review of Systems    I have reviewed the CC, HPI, ROS, PMH, FHX, Social History, and if not present in this note, I have reviewed in the patient's chart.   I agree with the documentation provided by other staff and have reviewed their documentation prior to providing my signature indicating agreement.    Objective :   General: Sheyla Terrazas is a 44 y.o. female who is alert and oriented and sitting comfortably in our office.  Ortho Exam  Physical Exam         BLE: Healing midline incisions bilaterally. There is some residual erythema about the proximal aspect of the right knee incision, though no evidence of dehiscence or superficial infection. No drainage or fluctuance.  Active range of motion of the right knee 5-120, left knee 0-120. 2+ DP/PT pulses with BCR. TA/EHL/FHL/GS motor intact. Saph/Silva/DP/SP nerves

## 2025-04-23 NOTE — PATIENT INSTRUCTIONS
PATIENTIQ:  PatientIQ helps [facility] stay in touch with you to know how you're feeling, and provides education and care instructions to you at various time points.   Your answers help your care team track your progress to provide the best care possible. PatientIQ will contact you pre-op and post-op via email or text with:  Educational Videos and Care Instructions  Questionnaires About How You're Feeling    Your participation provides you valuable education and helps [facility] continue to provide quality care to all patients. Thank you

## 2025-04-24 ENCOUNTER — HOSPITAL ENCOUNTER (OUTPATIENT)
Dept: PHYSICAL THERAPY | Age: 45
Setting detail: THERAPIES SERIES
Discharge: HOME | End: 2025-04-24
Attending: ORTHOPAEDIC SURGERY
Payer: COMMERCIAL

## 2025-04-24 PROCEDURE — 97110 THERAPEUTIC EXERCISES: CPT

## 2025-04-24 NOTE — FLOWSHEET NOTE
[facility]   Outpatient Rehabilitation & Therapy  [address]  P: [phone]   F: [phone]    Physical Therapy Daily Treatment Note    Date:  2025  Patient Name:  Sheyla Terrazas    :  1980  MRN: [id number]  Physician: Mo Siddiqi DO                          Insurance: [facility] - based on medical necessity. No hard max (authorization after 30  visit through [facility] Cares)   Medical Diagnosis: Primary OA of knee(s) (M17.0), Status post surgery (Z98.890)            Clinical Diagnosis: (R) knee pain (M25.561) (L) knee pain (M25.652) decreased knee range of motion (M25.661, M25.662)  Onset date: 2025                     Next 's appt.: 25  Visit Count:                                 Cancel/No Show: 0/0    Subjective:    Patient seen follow up with Dr Siddiqi yesterday.  Pleased with progress.  Allowed to RTW on .  Called Podiatrist and due to cx was able to make visit.  Instructed R foot pronation causing R knee valgus without shoe support.  Was referred to have orthotic made Friday May 2nd.  Denies pain and no longer taking Aleve for last 4 days.  Worst Pain 2-3/10  Over 24 hours  Pain  Pain:  [x] Yes   [] No       Location: (R) knee joint medial joint line and distal inner thigh.   Pain Rating: (0-10 scale) 0 /10 with pain medication   Worst: 2-3/10 with pain medication   Best: 0/10 with pain medication   Descriptors: \"intermittent\"  sharp, aching, burning, throbbing, stiffness    Pain  Pain:  [x] Yes   [] No       Location: (L) knee joint medial joint line and distal inner thigh.   Pain Rating: (0-10 scale)  0 /10 with pain medication   Worst: 2-3/10 with pain medication  Best: 0/10 with pain medication  Descriptors: \"intermittent\" sharp, aching, burning, throbbing, stiffness    Objective:  Modalities:   Precautions: anxiety, depression   Exercises:  Exercise Reps/ Time Weight/ Level Comments   Bike 5'  Seat 3   Squat from Chair 10 reps   Blue

## 2025-04-28 ENCOUNTER — HOSPITAL ENCOUNTER (OUTPATIENT)
Dept: PHYSICAL THERAPY | Age: 45
Setting detail: THERAPIES SERIES
Discharge: HOME | End: 2025-04-28
Attending: ORTHOPAEDIC SURGERY
Payer: COMMERCIAL

## 2025-04-28 PROCEDURE — 97110 THERAPEUTIC EXERCISES: CPT

## 2025-04-28 PROCEDURE — 97016 VASOPNEUMATIC DEVICE THERAPY: CPT

## 2025-04-28 NOTE — FLOWSHEET NOTE
Education:  [x] Yes  [] No  [x] Reviewed Prior HEP/Ed  Method of Education: [] Verbal  [] Demo  [] Written  Comprehension of Education:  [] Verbalizes understanding.  [] Demonstrates understanding.  [] Needs review.  [x] Demonstrates/verbalizes HEP/Ed previously given.    Plan: [x] Continue per plan of care.   [] Other:      Treatment Charges: Mins Units   []  Modalities     [x]  Ther Exercise 29 2   []  Manual Therapy     []  Ther Activities     []  Aquatics     []  Neuromuscular     [x] Vasocompression 15 1   [] Gait Training     [] Dry needling        [] 1 or 2 muscles        [] 3 or more muscles     []  Other     Total Billable time 44 3      Time In: 6:01      Time Out: 6:45    Electronically signed by:  Giancarlo Cote PTA

## 2025-04-30 ENCOUNTER — APPOINTMENT (OUTPATIENT)
Dept: PHYSICAL THERAPY | Age: 45
End: 2025-04-30
Attending: ORTHOPAEDIC SURGERY
Payer: COMMERCIAL

## 2025-05-05 ENCOUNTER — HOSPITAL ENCOUNTER (OUTPATIENT)
Dept: PHYSICAL THERAPY | Age: 45
Setting detail: THERAPIES SERIES
Discharge: HOME | End: 2025-05-05
Attending: ORTHOPAEDIC SURGERY
Payer: COMMERCIAL

## 2025-05-05 PROCEDURE — 97110 THERAPEUTIC EXERCISES: CPT

## 2025-05-05 NOTE — FLOWSHEET NOTE
[facility]   Outpatient Rehabilitation & Therapy  [address]  P: [phone]   F: [phone]    Physical Therapy Daily Treatment Note    Date:  2025  Patient Name:  Sheyla Terrazas    :  1980  MRN: [id number]  Physician: Mo Siddiqi DO                          Insurance: [facility] - based on medical necessity. No hard max (authorization after 30  visit through [facility] Cares)   Medical Diagnosis: Primary OA of knee(s) (M17.0), Status post surgery (Z98.890)            Clinical Diagnosis: (R) knee pain (M25.561) (L) knee pain (M25.652) decreased knee range of motion (M25.661, M25.662)  Onset date: 2025                     Next 's appt.: TBD  Visit Count:                                 Cancel/No Show: 0/0    Subjective:    Patient noted had first full week of work last week.  Beginning of week went better than expected.  Noted on Wednesday due to extended WBing had increase in soreness but diminished when WBing decreased and applied ice packs at home.      Pain  Pain:  [x] Yes   [] No       Location: (R) knee joint medial joint line and distal inner thigh.   Pain Rating: (0-10 scale) 0 /10 with pain medication   Worst: 2-3/10 with pain medication   Best: 0/10 with pain medication   Descriptors: \"intermittent\"  sharp, aching, burning, throbbing, stiffness    Pain  Pain:  [x] Yes   [] No       Location: (L) knee joint medial joint line and distal inner thigh.   Pain Rating: (0-10 scale)  0 /10 with pain medication   Worst: 2-3/10 with pain medication  Best: 0/10 with pain medication  Descriptors: \"intermittent\" sharp, aching, burning, throbbing, stiffness    Objective:  Modalities:   Precautions: anxiety, depression   Exercises:  Exercise Reps/ Time Weight/ Level Completed Comments   Bike 5'  x Seat 3   Squat from Chair 10 reps   NT Blue foam   Step Up F/L  10 reps x 2 sets  10 inch  x    Step Downs  10 reps x 2 sets  8 inch  x    3-way hip 15 reps   Green x    TKE

## 2025-05-07 ENCOUNTER — HOSPITAL ENCOUNTER (OUTPATIENT)
Dept: PHYSICAL THERAPY | Age: 45
Setting detail: THERAPIES SERIES
Discharge: HOME | End: 2025-05-07
Attending: ORTHOPAEDIC SURGERY
Payer: COMMERCIAL

## 2025-05-07 ENCOUNTER — APPOINTMENT (OUTPATIENT)
Dept: PHYSICAL THERAPY | Age: 45
End: 2025-05-07
Attending: ORTHOPAEDIC SURGERY
Payer: COMMERCIAL

## 2025-05-07 PROCEDURE — 97110 THERAPEUTIC EXERCISES: CPT

## 2025-05-07 ASSESSMENT — KOOS JR
RISING FROM SITTING: MILD
KOOS JR TOTAL INTERVAL SCORE: 70.704
GOING UP OR DOWN STAIRS: MILD
HOW SEVERE IS YOUR KNEE STIFFNESS AFTER FIRST WAKING IN MORNING: MODERATE
TWISING OR PIVOTING ON KNEE: MODERATE

## 2025-05-07 NOTE — THERAPY DISCHARGE
[facility]   Outpatient Rehabilitation & Therapy  [address]         Phone: [phone]       Fax: [phone]    Physical Therapy Progress Note Update/Discharge Summary     Date:  2025  Patient: Sheyla Terrazas  : 1980  MRN: [id number]  Physician: Mo Siddiqi DO    Insurance: [facility] - based on medical necessity. No hard max (authorization after  visit through [facility] Cares)   Medical Diagnosis: Primary OA of knee(s) (M17.0), Status post surgery (Z98.890)   Clinical Diagnosis: (R) knee pain (M25.561) (L) knee pain (M25.652) decreased knee range of motion (M25.661, M25.662)  Onset date: 2025  Next 's appt.: TBD  Visit Count:    Cancel/No Show: 0/0    Subjective:   Patient stated that the treatment sessions have continued to help. Relatively pain-free within the knees. Improved function/returned to work without restrictions. ADLs are going well with the exception of vacuuming and prolonged standing (beyond 3 hours during her work shift).      Function:  Hand Dominance  [x] Right  [] Left  Employer [employer]    Job Status [x]  Normal duty   [] Light duty   [] Off due to condition    []  Retired   [] Not employed   [] Disability  [] Other:  []  Return to work:   Work activities/duties Pharmacist       ADL/IADL Previous level of function Current level of function Who currently assists the patient with task   Bathing  [x] Independent  [] Assist [x] Independent  [] Assist    Dress/grooming [x] Independent  [] Assist [x] Independent  [] Assist    Transfer/mobility [x] Independent  [] Assist [x] Independent  [] Assist    Feeding [x] Independent  [] Assist [x] Independent  [] Assist    Toileting [x] Independent  [] Assist [x] Independent  [] Assist    Driving [x] Independent  [] Assist [] Independent  [x] Assist    Housekeeping [x] Independent  [] Assist [x] Independent  [] Assist    Grocery shop/meal prep [x]

## 2025-05-12 ENCOUNTER — APPOINTMENT (OUTPATIENT)
Dept: PHYSICAL THERAPY | Age: 45
End: 2025-05-12
Attending: ORTHOPAEDIC SURGERY
Payer: COMMERCIAL

## 2025-05-15 ENCOUNTER — APPOINTMENT (OUTPATIENT)
Dept: PHYSICAL THERAPY | Age: 45
End: 2025-05-15
Attending: ORTHOPAEDIC SURGERY
Payer: COMMERCIAL

## 2025-05-20 ENCOUNTER — PATIENT MESSAGE (OUTPATIENT)
Dept: FAMILY MEDICINE CLINIC | Age: 45
End: 2025-05-20

## 2025-05-20 DIAGNOSIS — J30.2 SEASONAL ALLERGIES: Primary | ICD-10-CM

## 2025-05-20 RX ORDER — FLUTICASONE PROPIONATE 50 MCG
2 SPRAY, SUSPENSION (ML) NASAL DAILY
Qty: 16 G | Refills: 0 | Status: SHIPPED | OUTPATIENT
Start: 2025-05-20

## 2025-06-02 DIAGNOSIS — F41.9 ANXIETY: ICD-10-CM

## 2025-06-02 RX ORDER — BUSPIRONE HYDROCHLORIDE 5 MG/1
TABLET ORAL
Qty: 180 TABLET | Refills: 3 | Status: SHIPPED | OUTPATIENT
Start: 2025-06-02

## 2025-06-30 ENCOUNTER — E-VISIT (OUTPATIENT)
Dept: FAMILY MEDICINE CLINIC | Age: 45
End: 2025-06-30
Payer: COMMERCIAL

## 2025-06-30 DIAGNOSIS — B37.9 YEAST INFECTION: Primary | ICD-10-CM

## 2025-06-30 PROCEDURE — 99422 OL DIG E/M SVC 11-20 MIN: CPT | Performed by: NURSE PRACTITIONER

## 2025-06-30 RX ORDER — FLUCONAZOLE 150 MG/1
150 TABLET ORAL
Qty: 2 TABLET | Refills: 0 | Status: SHIPPED | OUTPATIENT
Start: 2025-06-30 | End: 2025-07-06

## 2025-06-30 NOTE — PROGRESS NOTES
Patient reaches out today with complaints of what she believes to be a yeast infection.  Has had this in the past, states that Diflucan has worked for her well in the past.  Does not look to have any concerns of any STDs.  Plan to treat with Diflucan, if symptoms not improving she is to reach out to let us know and we can perform additional testing.  Denies any fever chills or bodyaches.    Orders Placed This Encounter    fluconazole (DIFLUCAN) 150 MG tablet     Sig: Take 1 tablet by mouth every 72 hours for 6 days     Dispense:  2 tablet     Refill:  0     12 minutes spent on visit.    Electronically signed by MARIE Clark CNP on 6/30/2025 at 1:45 PM

## 2025-07-01 DIAGNOSIS — F32.A DEPRESSION, UNSPECIFIED DEPRESSION TYPE: ICD-10-CM

## 2025-07-01 RX ORDER — VENLAFAXINE HYDROCHLORIDE 37.5 MG/1
37.5 CAPSULE, EXTENDED RELEASE ORAL DAILY
Qty: 30 CAPSULE | Refills: 3 | Status: SHIPPED | OUTPATIENT
Start: 2025-07-01

## 2025-07-07 DIAGNOSIS — I10 HYPERTENSION, UNSPECIFIED TYPE: ICD-10-CM

## 2025-07-07 RX ORDER — LISINOPRIL 30 MG/1
30 TABLET ORAL DAILY
Qty: 90 TABLET | Refills: 3 | Status: SHIPPED | OUTPATIENT
Start: 2025-07-07

## 2025-07-14 DIAGNOSIS — F41.9 ANXIETY: ICD-10-CM

## 2025-07-14 DIAGNOSIS — F32.A DEPRESSION, UNSPECIFIED DEPRESSION TYPE: ICD-10-CM

## 2025-07-14 DIAGNOSIS — N95.1 VASOMOTOR SYMPTOMS DUE TO MENOPAUSE: ICD-10-CM

## 2025-07-14 RX ORDER — VENLAFAXINE HYDROCHLORIDE 75 MG/1
75 CAPSULE, EXTENDED RELEASE ORAL DAILY
Qty: 30 CAPSULE | Refills: 2 | Status: SHIPPED | OUTPATIENT
Start: 2025-07-14

## 2025-07-14 NOTE — TELEPHONE ENCOUNTER
Please Approve or Refuse.  Send to Pharmacy per Pt's Request:      Next Visit Date:  Visit date not found   Last Visit Date: 6/30/2025    Hemoglobin A1C (%)   Date Value   01/22/2025 5.4             ( goal A1C is < 7)   BP Readings from Last 3 Encounters:   02/13/25 (!) 161/92   02/05/25 124/80   01/22/25 (!) 150/93          (goal 120/80)  BUN   Date Value Ref Range Status   02/12/2025 13 6 - 20 mg/dL Final     Creatinine   Date Value Ref Range Status   02/12/2025 0.9 0.50 - 0.90 mg/dL Final     Potassium   Date Value Ref Range Status   02/12/2025 4.3 3.7 - 5.3 mmol/L Final

## 2025-07-18 DIAGNOSIS — Z96.651 S/P TOTAL KNEE ARTHROPLASTY, RIGHT: ICD-10-CM

## 2025-07-18 DIAGNOSIS — T81.89XA SUTURE REACTION, INITIAL ENCOUNTER: Primary | ICD-10-CM

## 2025-07-18 RX ORDER — CEPHALEXIN 500 MG/1
500 CAPSULE ORAL 2 TIMES DAILY
Qty: 14 CAPSULE | Refills: 0 | Status: SHIPPED | OUTPATIENT
Start: 2025-07-18 | End: 2025-07-25

## 2025-07-18 NOTE — TELEPHONE ENCOUNTER
Medication filled - Keflex 500mg BID x 7 days for suture reaction. Please make patient aware medication was sent to the patient's pharmacy.  She will be evaluated on 7/24/2025 at 8:15 AM at our [facility] location for incision check.    Thank you     Right knee incision - Patient provided on 7/18/2025

## 2025-07-24 ENCOUNTER — OFFICE VISIT (OUTPATIENT)
Dept: ORTHOPEDIC SURGERY | Age: 45
End: 2025-07-24
Payer: COMMERCIAL

## 2025-07-24 VITALS — RESPIRATION RATE: 18 BRPM | OXYGEN SATURATION: 97 % | WEIGHT: 200 LBS | BODY MASS INDEX: 34.15 KG/M2 | HEIGHT: 64 IN

## 2025-07-24 DIAGNOSIS — Z96.652 S/P TOTAL KNEE ARTHROPLASTY, LEFT: ICD-10-CM

## 2025-07-24 DIAGNOSIS — Z96.651 S/P TOTAL KNEE ARTHROPLASTY, RIGHT: ICD-10-CM

## 2025-07-24 DIAGNOSIS — T81.89XD SUTURE REACTION, SUBSEQUENT ENCOUNTER: Primary | ICD-10-CM

## 2025-07-24 LAB
CHOLEST SERPL-MCNC: 154 MG/DL (ref 0–199)
CHOLESTEROL/HDL RATIO: 2.9
GLUCOSE SERPL-MCNC: 95 MG/DL (ref 74–99)
HDLC SERPL-MCNC: 54 MG/DL
LDLC SERPL CALC-MCNC: 82 MG/DL (ref 0–100)
PATIENT FASTING?: YES
TRIGL SERPL-MCNC: 91 MG/DL (ref 0–149)

## 2025-07-24 PROCEDURE — 99213 OFFICE O/P EST LOW 20 MIN: CPT | Performed by: PHYSICIAN ASSISTANT

## 2025-07-24 ASSESSMENT — ENCOUNTER SYMPTOMS
COLOR CHANGE: 0
COUGH: 0
SHORTNESS OF BREATH: 0
VOMITING: 0

## 2025-07-24 NOTE — PATIENT INSTRUCTIONS
PATIENTIQ:  PatientIQ helps [facility] stay in touch with you to know how you're feeling, and provides education and care instructions to you at various time points.   Your answers help your care team track your progress to provide the best care possible. PatientIQ will contact you pre-op and post-op via email or text with:  Educational Videos and Care Instructions  Questionnaires About How You're Feeling    Your participation provides you valuable education and helps [facility] continue to provide quality care to all patients. Thank you

## 2025-07-29 DIAGNOSIS — N92.6 IRREGULAR MENSTRUAL CYCLE: Primary | ICD-10-CM

## 2025-08-15 ENCOUNTER — PATIENT MESSAGE (OUTPATIENT)
Dept: ORTHOPEDIC SURGERY | Age: 45
End: 2025-08-15

## 2025-08-15 DIAGNOSIS — Z96.652 S/P TOTAL KNEE ARTHROPLASTY, LEFT: ICD-10-CM

## 2025-08-15 DIAGNOSIS — Z96.651 S/P TOTAL KNEE ARTHROPLASTY, RIGHT: ICD-10-CM

## 2025-08-15 DIAGNOSIS — Z79.2 PROPHYLACTIC ANTIBIOTIC: Primary | ICD-10-CM

## 2025-08-18 RX ORDER — AMOXICILLIN 500 MG/1
CAPSULE ORAL
Qty: 4 CAPSULE | Refills: 0 | Status: SHIPPED | OUTPATIENT
Start: 2025-08-18

## 2025-09-02 ENCOUNTER — PATIENT MESSAGE (OUTPATIENT)
Dept: OBGYN CLINIC | Age: 45
End: 2025-09-02

## 2025-09-02 DIAGNOSIS — N89.8 VAGINAL DISCHARGE: Primary | ICD-10-CM

## 2025-09-03 RX ORDER — METRONIDAZOLE 500 MG/1
500 TABLET ORAL 2 TIMES DAILY
Qty: 14 TABLET | Refills: 0 | Status: SHIPPED | OUTPATIENT
Start: 2025-09-03 | End: 2025-09-10

## (undated) DEVICE — DRAPE,U/ SHT,SPLIT,PLAS,STERIL: Brand: MEDLINE

## (undated) DEVICE — GLOVE ORANGE PI 7 1/2   MSG9075

## (undated) DEVICE — DECANTER FLD 9IN ST BG FOR ASEP TRNSF OF FLD

## (undated) DEVICE — RECIPROCATING BLADE, DOUBLE SIDED, OFFSET  (70.0 X 0.8 X 12.5MM)

## (undated) DEVICE — CEMENT MIXING SYSTEM WITH FEMORAL BREAKWAY NOZZLE: Brand: REVOLUTION

## (undated) DEVICE — SYRINGE IRRIG 60ML SFT PLIABLE BLB EZ TO GRP 1 HND USE W/

## (undated) DEVICE — STERILE PVP: Brand: MEDLINE INDUSTRIES, INC.

## (undated) DEVICE — YANKAUER,FLEXIBLE HANDLE,REGLR CAPACITY: Brand: MEDLINE INDUSTRIES, INC.

## (undated) DEVICE — OSCILLATING TIP SAW CARTRIDGE: Brand: PRECISION FALCON

## (undated) DEVICE — Z DISCONTINUED USE 2423037 APPLICATOR MEDICATED 3 CC CHLORHEXIDINE GLUC 2% CHLORAPREP

## (undated) DEVICE — SUTURE STRATAFIX SPRL SZ 3-0 L5IN ABSRB UD FS L26MM 3/8 CIR SXMP2B411

## (undated) DEVICE — TUBING, SUCTION, 1/4" X 12', STRAIGHT: Brand: MEDLINE

## (undated) DEVICE — GLOVE ORANGE PI 8   MSG9080

## (undated) DEVICE — ZIMMER® STERILE DISPOSABLE TOURNIQUET CUFF WITH PROTECTIVE SLEEVE AND PLC, DUAL PORT, SINGLE BLADDER, 34 IN. (86 CM)

## (undated) DEVICE — ELECTRODE PT RET AD L9FT HI MOIST COND ADH HYDRGEL CORDED

## (undated) DEVICE — 3 BONE CEMENT MIXER: Brand: MIXEVAC

## (undated) DEVICE — GEL US 20GM NONIRRITATING OVERWRAPPED FILE PCH TRNSMIT

## (undated) DEVICE — DRAPE,REIN 53X77,STERILE: Brand: MEDLINE

## (undated) DEVICE — STRIP,CLOSURE,WOUND,MEDI-STRIP,1/2X4: Brand: MEDLINE

## (undated) DEVICE — STERILE PATIENT PROTECTIVE PAD FOR IMP® KNEE POSITIONERS & COHESIVE WRAP (10 / CASE): Brand: DE MAYO KNEE POSITIONER®

## (undated) DEVICE — COVER XR CASS W24XL25IN CLR POLY FLM DRAPEABLE W REL LNR

## (undated) DEVICE — GOWN,AURORA,NONRNF,XL,30/CS: Brand: MEDLINE

## (undated) DEVICE — SUTURE VCRL SZ 0 L27IN ABSRB UD L36MM CT-1 1/2 CIR J260H

## (undated) DEVICE — BLADE RMR L46MM PAT PILOT H

## (undated) DEVICE — APPLICATOR MEDICATED 26 CC SOLUTION HI LT ORNG CHLORAPREP

## (undated) DEVICE — SUTURE ABSORBABLE MONOFILAMENT 1 CTX 36 CM 48 MM VIO PDS +

## (undated) DEVICE — SUTURE STRATAFIX SPRL SZ 1 L14IN ABSRB VLT L48CM CTX 1/2 SXPD2B405

## (undated) DEVICE — SUTURE ABSRB BRAID COAT UD CP NO 2 27IN VCRL J195H

## (undated) DEVICE — PACK PROCEDURE SURG SVMMC TOT KNEE

## (undated) DEVICE — SUTURE VCRL SZ 1 L36IN ABSRB UD L48MM CTXB 1/2 CIR BLNT PNT JB977H

## (undated) DEVICE — TUBE IRRIG HNDPC HI FLO TP INTRPULS W/SUCTION TUBE

## (undated) DEVICE — DRAPE,T,LIMB,BILATERAL,STERILE: Brand: MEDLINE

## (undated) DEVICE — STANDARD HYPODERMIC NEEDLE,POLYPROPYLENE HUB: Brand: MONOJECT

## (undated) DEVICE — DRESSING FOAM W4XL4IN AG SIL FACE BORD IONIC ANTIMIC ADH

## (undated) DEVICE — SUTURE ABSORBABLE MONOFILAMENT 2-0 CT-1 24 CM 36 MM VIO PDS+

## (undated) DEVICE — 3M™ IOBAN™ 2 ANTIMICROBIAL INCISE DRAPE 6650EZ: Brand: IOBAN™ 2

## (undated) DEVICE — CONTAINER,SPECIMEN,OR STERILE,4OZ: Brand: MEDLINE

## (undated) DEVICE — SUTURE VICRYL SZ 1 L36IN ABSRB UD L36MM CT-1 1/2 CIR J947H

## (undated) DEVICE — 4-PORT MANIFOLD: Brand: NEPTUNE 2

## (undated) DEVICE — OPTIFOAM GENTLE AG,POST-OP STRIP,3.5X14: Brand: MEDLINE

## (undated) DEVICE — STOCKINETTE,IMPERVIOUS,12X48,STERILE: Brand: MEDLINE

## (undated) DEVICE — SYRINGE MED 30ML STD CLR PLAS LUERLOCK TIP N CTRL DISP

## (undated) DEVICE — NEEDLE ECHOGENIC 20GA L4IN INSUL W/ 30DEG BVL EXTN SET

## (undated) DEVICE — HYPODERMIC SAFETY NEEDLE: Brand: MAGELLAN

## (undated) DEVICE — SUTURE STRATAFIX SPRL SZ 2-0 L9IN ABSRB VLT MH L36MM 1/2 SXPD2B408

## (undated) DEVICE — SOLUTION IRRIG 1000ML 0.9% SOD CHL USP POUR PLAS BTL

## (undated) DEVICE — NEPTUNE E-SEP SMOKE EVACUATION PENCIL, COATED, 70MM BLADE, PUSH BUTTON SWITCH: Brand: NEPTUNE E-SEP

## (undated) DEVICE — BANDAGE COBAN 6 IN WND 6INX5YD FOAM

## (undated) DEVICE — ADHESIVE SKIN CLSR 0.7ML TOP DERMBND ADV

## (undated) DEVICE — SUTURE MCRYL SZ 3-0 L18IN ABSRB UD L19MM PS-2 3/8 CIR PRIM Y497G

## (undated) DEVICE — BLADE,CARBON-STEEL,15,STRL,DISPOSABLE,TB: Brand: MEDLINE

## (undated) DEVICE — GLOVE ORANGE PI 7   MSG9070

## (undated) DEVICE — SOLUTION IRRIG 3000ML 0.9% SOD CHL USP UROMATIC PLAS CONT

## (undated) DEVICE — Device

## (undated) DEVICE — SUTURE NONABSORBABLE MONOFILAMENT 3-0 PS-1 18 IN BLK ETHILON 1663H

## (undated) DEVICE — GLOVE SURG SZ 85 CRM LTX FREE POLYISOPRENE POLYMER BEAD ANTI

## (undated) DEVICE — SUTURE VCRL SZ 2-0 L27IN ABSRB UD L22MM X-1 1/2 CIR REV CUT J459H

## (undated) DEVICE — COVER,LIGHT HANDLE,FLX,2/PK: Brand: MEDLINE INDUSTRIES, INC.

## (undated) DEVICE — 450 ML BOTTLE OF 0.05% CHLORHEXIDINE GLUCONATE IN 99.95% STERILE WATER FOR IRRIGATION, USP AND APPLICATOR.: Brand: IRRISEPT ANTIMICROBIAL WOUND LAVAGE

## (undated) DEVICE — SUTURE VICRYL SZ 0 L36IN ABSRB UD L36MM CT-1 1/2 CIR J946H